# Patient Record
Sex: FEMALE | Race: WHITE | NOT HISPANIC OR LATINO | URBAN - METROPOLITAN AREA
[De-identification: names, ages, dates, MRNs, and addresses within clinical notes are randomized per-mention and may not be internally consistent; named-entity substitution may affect disease eponyms.]

---

## 2018-06-20 ENCOUNTER — INPATIENT (INPATIENT)
Facility: HOSPITAL | Age: 48
LOS: 6 days | Discharge: HOME | End: 2018-06-27
Attending: PSYCHIATRY & NEUROLOGY | Admitting: PSYCHIATRY & NEUROLOGY
Payer: MEDICAID

## 2018-06-20 VITALS
SYSTOLIC BLOOD PRESSURE: 151 MMHG | DIASTOLIC BLOOD PRESSURE: 95 MMHG | OXYGEN SATURATION: 100 % | RESPIRATION RATE: 20 BRPM | HEART RATE: 93 BPM | TEMPERATURE: 98 F

## 2018-06-20 DIAGNOSIS — F12.10 CANNABIS ABUSE, UNCOMPLICATED: ICD-10-CM

## 2018-06-20 DIAGNOSIS — F60.3 BORDERLINE PERSONALITY DISORDER: ICD-10-CM

## 2018-06-20 DIAGNOSIS — F33.2 MAJOR DEPRESSIVE DISORDER, RECURRENT SEVERE WITHOUT PSYCHOTIC FEATURES: ICD-10-CM

## 2018-06-20 DIAGNOSIS — F33.9 MAJOR DEPRESSIVE DISORDER, RECURRENT, UNSPECIFIED: ICD-10-CM

## 2018-06-20 DIAGNOSIS — F43.29 ADJUSTMENT DISORDER WITH OTHER SYMPTOMS: ICD-10-CM

## 2018-06-20 LAB
ALBUMIN SERPL ELPH-MCNC: 4.3 G/DL — SIGNIFICANT CHANGE UP (ref 3.3–5)
ALP SERPL-CCNC: 56 U/L — SIGNIFICANT CHANGE UP (ref 40–120)
ALT FLD-CCNC: 12 U/L — SIGNIFICANT CHANGE UP (ref 4–33)
AMPHET UR-MCNC: NEGATIVE — SIGNIFICANT CHANGE UP
APAP SERPL-MCNC: < 15 UG/ML — LOW (ref 15–25)
APPEARANCE UR: SIGNIFICANT CHANGE UP
AST SERPL-CCNC: 14 U/L — SIGNIFICANT CHANGE UP (ref 4–32)
BACTERIA # UR AUTO: SIGNIFICANT CHANGE UP
BARBITURATES UR SCN-MCNC: NEGATIVE — SIGNIFICANT CHANGE UP
BASOPHILS # BLD AUTO: 0.02 K/UL — SIGNIFICANT CHANGE UP (ref 0–0.2)
BASOPHILS NFR BLD AUTO: 0.2 % — SIGNIFICANT CHANGE UP (ref 0–2)
BENZODIAZ UR-MCNC: POSITIVE — SIGNIFICANT CHANGE UP
BILIRUB SERPL-MCNC: 0.2 MG/DL — SIGNIFICANT CHANGE UP (ref 0.2–1.2)
BILIRUB UR-MCNC: NEGATIVE — SIGNIFICANT CHANGE UP
BLOOD UR QL VISUAL: NEGATIVE — SIGNIFICANT CHANGE UP
BUN SERPL-MCNC: 7 MG/DL — SIGNIFICANT CHANGE UP (ref 7–23)
CALCIUM SERPL-MCNC: 9.2 MG/DL — SIGNIFICANT CHANGE UP (ref 8.4–10.5)
CANNABINOIDS UR-MCNC: POSITIVE — SIGNIFICANT CHANGE UP
CHLORIDE SERPL-SCNC: 101 MMOL/L — SIGNIFICANT CHANGE UP (ref 98–107)
CO2 SERPL-SCNC: 26 MMOL/L — SIGNIFICANT CHANGE UP (ref 22–31)
COCAINE METAB.OTHER UR-MCNC: NEGATIVE — SIGNIFICANT CHANGE UP
COLOR SPEC: YELLOW — SIGNIFICANT CHANGE UP
CREAT SERPL-MCNC: 1.09 MG/DL — SIGNIFICANT CHANGE UP (ref 0.5–1.3)
EOSINOPHIL # BLD AUTO: 0.08 K/UL — SIGNIFICANT CHANGE UP (ref 0–0.5)
EOSINOPHIL NFR BLD AUTO: 0.9 % — SIGNIFICANT CHANGE UP (ref 0–6)
ETHANOL BLD-MCNC: < 10 MG/DL — SIGNIFICANT CHANGE UP
GLUCOSE SERPL-MCNC: 100 MG/DL — HIGH (ref 70–99)
GLUCOSE UR-MCNC: NEGATIVE — SIGNIFICANT CHANGE UP
HCG UR-SCNC: NEGATIVE — SIGNIFICANT CHANGE UP
HCT VFR BLD CALC: 36 % — SIGNIFICANT CHANGE UP (ref 34.5–45)
HGB BLD-MCNC: 12 G/DL — SIGNIFICANT CHANGE UP (ref 11.5–15.5)
IMM GRANULOCYTES # BLD AUTO: 0.02 # — SIGNIFICANT CHANGE UP
IMM GRANULOCYTES NFR BLD AUTO: 0.2 % — SIGNIFICANT CHANGE UP (ref 0–1.5)
KETONES UR-MCNC: SIGNIFICANT CHANGE UP
LEUKOCYTE ESTERASE UR-ACNC: NEGATIVE — SIGNIFICANT CHANGE UP
LYMPHOCYTES # BLD AUTO: 2.63 K/UL — SIGNIFICANT CHANGE UP (ref 1–3.3)
LYMPHOCYTES # BLD AUTO: 29.6 % — SIGNIFICANT CHANGE UP (ref 13–44)
MCHC RBC-ENTMCNC: 30.2 PG — SIGNIFICANT CHANGE UP (ref 27–34)
MCHC RBC-ENTMCNC: 33.3 % — SIGNIFICANT CHANGE UP (ref 32–36)
MCV RBC AUTO: 90.7 FL — SIGNIFICANT CHANGE UP (ref 80–100)
METHADONE UR-MCNC: NEGATIVE — SIGNIFICANT CHANGE UP
MONOCYTES # BLD AUTO: 0.66 K/UL — SIGNIFICANT CHANGE UP (ref 0–0.9)
MONOCYTES NFR BLD AUTO: 7.4 % — SIGNIFICANT CHANGE UP (ref 2–14)
MUCOUS THREADS # UR AUTO: SIGNIFICANT CHANGE UP
NEUTROPHILS # BLD AUTO: 5.49 K/UL — SIGNIFICANT CHANGE UP (ref 1.8–7.4)
NEUTROPHILS NFR BLD AUTO: 61.7 % — SIGNIFICANT CHANGE UP (ref 43–77)
NITRITE UR-MCNC: NEGATIVE — SIGNIFICANT CHANGE UP
NRBC # FLD: 0 — SIGNIFICANT CHANGE UP
OPIATES UR-MCNC: NEGATIVE — SIGNIFICANT CHANGE UP
OXYCODONE UR-MCNC: NEGATIVE — SIGNIFICANT CHANGE UP
PCP UR-MCNC: NEGATIVE — SIGNIFICANT CHANGE UP
PH UR: 5.5 — SIGNIFICANT CHANGE UP (ref 4.6–8)
PLATELET # BLD AUTO: 307 K/UL — SIGNIFICANT CHANGE UP (ref 150–400)
PMV BLD: 9.6 FL — SIGNIFICANT CHANGE UP (ref 7–13)
POTASSIUM SERPL-MCNC: 4.3 MMOL/L — SIGNIFICANT CHANGE UP (ref 3.5–5.3)
POTASSIUM SERPL-SCNC: 4.3 MMOL/L — SIGNIFICANT CHANGE UP (ref 3.5–5.3)
PROT SERPL-MCNC: 7.2 G/DL — SIGNIFICANT CHANGE UP (ref 6–8.3)
PROT UR-MCNC: 10 MG/DL — SIGNIFICANT CHANGE UP
RBC # BLD: 3.97 M/UL — SIGNIFICANT CHANGE UP (ref 3.8–5.2)
RBC # FLD: 13.1 % — SIGNIFICANT CHANGE UP (ref 10.3–14.5)
RBC CASTS # UR COMP ASSIST: SIGNIFICANT CHANGE UP (ref 0–?)
SALICYLATES SERPL-MCNC: < 5 MG/DL — LOW (ref 15–30)
SODIUM SERPL-SCNC: 137 MMOL/L — SIGNIFICANT CHANGE UP (ref 135–145)
SP GR SPEC: 1.02 — SIGNIFICANT CHANGE UP (ref 1–1.04)
SQUAMOUS # UR AUTO: SIGNIFICANT CHANGE UP
TSH SERPL-MCNC: 4.49 UIU/ML — HIGH (ref 0.27–4.2)
UROBILINOGEN FLD QL: NORMAL MG/DL — SIGNIFICANT CHANGE UP
WBC # BLD: 8.9 K/UL — SIGNIFICANT CHANGE UP (ref 3.8–10.5)
WBC # FLD AUTO: 8.9 K/UL — SIGNIFICANT CHANGE UP (ref 3.8–10.5)
WBC UR QL: SIGNIFICANT CHANGE UP (ref 0–?)

## 2018-06-20 PROCEDURE — 99285 EMERGENCY DEPT VISIT HI MDM: CPT

## 2018-06-20 RX ORDER — CLONAZEPAM 1 MG
0.5 TABLET ORAL
Qty: 0 | Refills: 0 | Status: DISCONTINUED | OUTPATIENT
Start: 2018-06-20 | End: 2018-06-26

## 2018-06-20 RX ORDER — LANOLIN ALCOHOL/MO/W.PET/CERES
3 CREAM (GRAM) TOPICAL AT BEDTIME
Qty: 0 | Refills: 0 | Status: DISCONTINUED | OUTPATIENT
Start: 2018-06-20 | End: 2018-06-26

## 2018-06-20 RX ORDER — ACETAMINOPHEN 500 MG
650 TABLET ORAL ONCE
Qty: 0 | Refills: 0 | Status: COMPLETED | OUTPATIENT
Start: 2018-06-20 | End: 2018-06-20

## 2018-06-20 NOTE — ED PROVIDER NOTE - OBJECTIVE STATEMENT
This is a 47 year old Female MARIANO Family for psych eval r/t increased depression. On arrival patient is crying. patient reports he son  of an opiate overdose last year and her mother passed away 9 month ago.  Since then she is having increased depression , panic attacks and stopped taking her medication with is making her works. Reports decreased appetite and lethargy. States " I just want to go to sleep".  Reports SI thought and "being better off with my son"  Denies HI Denies AH/VH Denies ETOH/Illicit drugs

## 2018-06-20 NOTE — ED BEHAVIORAL HEALTH ASSESSMENT NOTE - SUMMARY
Patient is a 48 y/o  F, , domiciled with BF of 4 years, disabled, has an adult son who is living and a 2nd son who  of an opiate overdose at this time last year, with PPhx of major depressive disorder (pt states she was diagnosed with bipolar d/o 20 years but this dx has been switched to major depressive disorder by her outpatient psychiatrist), borderline PD, and cannabis abuse, 5 prior inpt admissions last in , 5 prior suicide attempts by OD last in  (requiring activated charcoal and gastric lavage), no reported hx of violence/legal problems, and Pmhx of gastritis and migraine headaches. Patient self presents to the ED for worsening depressed mood and intensifying suicidal thoughts in the context of the anniversary or her son's death and noncompliance with psychotropic medications. Patient requests voluntary inpt hospitalization for safety, stabilization, and restarting meds.

## 2018-06-20 NOTE — ED BEHAVIORAL HEALTH ASSESSMENT NOTE - SUICIDE RISK FACTORS
Agitation/severe anxiety/Access to means (pills, firearms, etc.)/Substance abuse/dependence/Global insomnia/Anhedonia/Unable to engage in safety planning/Hopelessness/Mood episode/Perceived burden on family and others

## 2018-06-20 NOTE — ED ADULT NURSE NOTE - OBJECTIVE STATEMENT
Marva RN: Pt currently in , presents in the ed for depression and HI without plans. Paulino grider Fac RN: Pt currently in , presents in the ed for depression and SI without plans. Pt currently still mourning death of son which took place last year, unable to cope. No other complaints voiced out. Pt was seen by NP, blood work sent by LYDIA Keen.

## 2018-06-20 NOTE — ED BEHAVIORAL HEALTH ASSESSMENT NOTE - RISK ASSESSMENT
Risk factors include current suicidal ideation, prior suicide attempts, hopelessness, anhedonia, global insomnia, severe anxiety, noncompliance with medications, and cannabis abuse. At this time pt is at high risk of imminent harm and requires inpt hospitalization for safety and stabilization.

## 2018-06-20 NOTE — ED ADULT NURSE REASSESSMENT NOTE - GENERAL PATIENT STATE
comfortable appearance/remains awake, a&ox3, calm with depressed affect. Denies intent to harm self/cooperative

## 2018-06-20 NOTE — ED BEHAVIORAL HEALTH ASSESSMENT NOTE - CASE SUMMARY
Ms. Foster is a 46 y/o F with hx of depression with prior hospitalizations and prior suicide attempt who presents with increasing depressive symptoms, tearful, sad, anhedonic, hopeless, and suicidal. She has been partially compliant with medications (stopped her prozac x 2 weeks). She is a danger to herself at this time, collateral is concerning for worsening mental status, and she is agreeable to voluntary hospitalization and likely will benefit from same. no medical issues, no medication allergies (intolerant of Lithium). agree with starting vibrid as this is outpatient plan and patient has tried many other antidepressants with minimal benefit per her report.

## 2018-06-20 NOTE — ED BEHAVIORAL HEALTH ASSESSMENT NOTE - DESCRIPTION (FIRST USE, LAST USE, QUANTITY, FREQUENCY, DURATION)
had "a few drinks" in [place] 6/14-6/18. hx of abuse. Last used 4 days ago remote hx of benzo abuse. Prescribed xanax 1mg daily (Istop reference [id number]) but BF states that pt takes more than prescribed (unknown amount)

## 2018-06-20 NOTE — ED ADULT NURSE REASSESSMENT NOTE - STATUS
awaiting transfer, no change/medically cleared for [facility] admission by Zoran WILEY

## 2018-06-20 NOTE — ED BEHAVIORAL HEALTH ASSESSMENT NOTE - DETAILS
see HPI "bloated" grandmother with bipolar d/o, brother with schizophrenia, mother with depression. Denies suicide attempts patient, her BF, and psychiatrist were informed Dr Garcia

## 2018-06-20 NOTE — ED BEHAVIORAL HEALTH NOTE - BEHAVIORAL HEALTH NOTE
Writer obtained collateral information from pt's boyfriend Marvin Daly [phone]  He reports that pt is depressed, has been mourning the anniversary of her son's death (May 27th, OD last year). He states she has been watching videos of her son, crying all day, and taking larger amounts of her medication (prescribed prozac and xanax), believes that pt has been taking xanax at higher doses than prescribed). He reports patient has endorsed suicidal thoughts (no plan), even when they were recently on vacation in [facility] he reports pt just wanted to stay in bed and endorsed SI. He reports pt has made comments of wanting to "sleep and not wake up". Endorses safety concerns. He reports pt sees Dr. Rachel in [place] [phone].

## 2018-06-20 NOTE — ED BEHAVIORAL HEALTH ASSESSMENT NOTE - DESCRIPTION
tearful, requesting admission    Vital Signs Last 24 Hrs  T(C): 36.8 (20 Jun 2018 19:51), Max: 36.8 (20 Jun 2018 19:51)  T(F): 98.2 (20 Jun 2018 19:51), Max: 98.2 (20 Jun 2018 19:51)  HR: 93 (20 Jun 2018 19:51) (93 - 93)  BP: 151/95 (20 Jun 2018 19:51) (151/95 - 151/95)  BP(mean): --  RR: 20 (20 Jun 2018 19:51) (20 - 20)  SpO2: 100% (20 Jun 2018 19:51) (100% - 100%) see HPI. NKDA see HPI. 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

## 2018-06-20 NOTE — ED BEHAVIORAL HEALTH ASSESSMENT NOTE - PAST PSYCHOTROPIC MEDICATION
remote trials of several medications (pt does not remember all of them). Recently pt was on Prozac and seroquel (this was stopped 1 year ago)

## 2018-06-20 NOTE — ED PROVIDER NOTE - MEDICAL DECISION MAKING DETAILS
This is a 47 year old Female MARIANO Family for psych eval r/t increased depression. On arrival patient is crying. patient reports he son  of an opiate overdose last year and her mother passed away 9 month ago.  Since then she is having increased depression , panic attacks and stopped taking her medication with is making her works. Reports decreased appetite and lethargy. States " I just want to go to sleep" Medical evaluation performed. There is no clinical evidence of intoxication or any acute medical problem requiring immediate intervention. Final disposition will be determined by psychiatrist.

## 2018-06-20 NOTE — ED ADULT TRIAGE NOTE - CHIEF COMPLAINT QUOTE
Pt with co depression and SI thoughts tearful in triage reports that her son  of opoid over dose x 1 year ago and that she is not coping well. pt admits to not taking her Prozac x 2 weeks, /pt took xanax 1mg prior to coming to hospital.

## 2018-06-20 NOTE — ED BEHAVIORAL HEALTH ASSESSMENT NOTE - HPI (INCLUDE ILLNESS QUALITY, SEVERITY, DURATION, TIMING, CONTEXT, MODIFYING FACTORS, ASSOCIATED SIGNS AND SYMPTOMS)
Patient is a 46 y/o  F, , domiciled with BF of 4 years, disabled, has an adult son who is living and a 2nd son who  of an opiate overdose at this time last year, with PPhx of major depressive disorder (pt states she was diagnosed with bipolar d/o 20 years but this dx has been switched to major depressive disorder by her outpatient psychiatrist), borderline PD, and cannabis abuse, 5 prior inpt admissions last in , 5 prior suicide attempts by OD last in  (requiring activated charcoal and gastric lavage), no reported hx of violence/legal problems, and Pmhx of gastritis and migraine headaches. Patient self presents to the ED for worsening depressed mood and intensifying suicidal thoughts in the context of the anniversary or her son's death.    On interview pt is tearful, stating that since her son  of an opiate overdose last year she has been feeling increasingly guilty and blaming herself for his death. In addition, pt states her mother had  9 months prior to her son, so patient was grieving many losses at the same time. Patient reports that for the past 2 months she has been feeling increasingly depressed, having panic attacks every couple days, anhedonic, and having no motivation to get out of bed. She reports poor sleep, low appetite, poor energy level, and inability to concentrate. She states that she has been having thoughts that she would be better off dead with her son, and states these thoughts are intensifying, leading pt to bring herself to the ED for help. She requests inpt hospitalization for safety and stabilization. Patient states she has been on Prozac 40mg daily for 20 years but self-d/michael it 2 weeks ago as it has not been helping. She states her psychiatrist prescribed Viibryd but she has been hesitant to start it due to fear of side effects. She reports that she would feel safer starting it in the inpatient setting where she can be monitored. She denies HI/I/P or access to firearms. She denies all current/past manic and psychotic symptoms. She reports smoking MJ 4 days ago and while she was on vacation in [place] from - (pt states she did not enjoy her vacation except for the moments she was using substances). She reports having having "a few drinks" while in [place]. She otherwise denies substance use.     See collateral from pt's BF in the  note. Patient is a 46 y/o  F, , domiciled with BF of 4 years, disabled, has an adult son who is living and a 2nd son who  of an opiate overdose at this time last year, with PPhx of major depressive disorder (pt states she was diagnosed with bipolar d/o 20 years but this dx has been switched to major depressive disorder by her outpatient psychiatrist), borderline PD, and cannabis abuse, 5 prior inpt admissions last in , 5 prior suicide attempts by OD last in  (requiring activated charcoal and gastric lavage), no reported hx of violence/legal problems, and Pmhx of gastritis and migraine headaches. Patient self presents to the ED for worsening depressed mood and intensifying suicidal thoughts in the context of the anniversary or her son's death.    On interview pt is tearful, stating that since her son  of an opiate overdose last year she has been feeling increasingly guilty and blaming herself for his death. In addition, pt states her mother had  9 months prior to her son, so patient was grieving many losses at the same time. Patient reports that for the past 2 months she has been feeling increasingly depressed, having panic attacks every couple days, anhedonic, and having no motivation to get out of bed. She reports poor sleep, low appetite, poor energy level, and inability to concentrate. She states that she has been having thoughts that she would be better off dead with her son, and states these thoughts are intensifying, leading pt to bring herself to the ED for help. She requests inpt hospitalization for safety and stabilization. Patient states she has been on Prozac 40mg daily for 20 years but self-d/michael it 2 weeks ago as it has not been helping. She states her psychiatrist prescribed Viibryd but she has been hesitant to start it due to fear of side effects. She reports that she would feel safer starting it in the inpatient setting where she can be monitored. She denies HI/I/P or access to firearms. She denies all current/past manic and psychotic symptoms. She reports smoking MJ 4 days ago and while she was on vacation in [place] from - (pt states she did not enjoy her vacation except for the moments she was using substances). She reports having had "a few drinks" while in [place]. She otherwise denies substance use.     See collateral from pt's BF in the  note.

## 2018-06-21 PROCEDURE — 99222 1ST HOSP IP/OBS MODERATE 55: CPT

## 2018-06-21 RX ADMIN — Medication 650 MILLIGRAM(S): at 01:22

## 2018-06-21 RX ADMIN — Medication 3 MILLIGRAM(S): at 00:07

## 2018-06-21 RX ADMIN — Medication 0.5 MILLIGRAM(S): at 21:33

## 2018-06-21 RX ADMIN — Medication 650 MILLIGRAM(S): at 00:07

## 2018-06-21 RX ADMIN — Medication 0.5 MILLIGRAM(S): at 00:07

## 2018-06-21 RX ADMIN — Medication 0.5 MILLIGRAM(S): at 08:02

## 2018-06-22 PROCEDURE — 99232 SBSQ HOSP IP/OBS MODERATE 35: CPT | Mod: 25

## 2018-06-22 PROCEDURE — 90853 GROUP PSYCHOTHERAPY: CPT

## 2018-06-22 RX ORDER — TRAZODONE HCL 50 MG
50 TABLET ORAL AT BEDTIME
Qty: 0 | Refills: 0 | Status: DISCONTINUED | OUTPATIENT
Start: 2018-06-22 | End: 2018-06-24

## 2018-06-22 RX ORDER — HYDROXYZINE HCL 10 MG
25 TABLET ORAL EVERY 6 HOURS
Qty: 0 | Refills: 0 | Status: DISCONTINUED | OUTPATIENT
Start: 2018-06-22 | End: 2018-06-27

## 2018-06-22 RX ADMIN — Medication 3 MILLIGRAM(S): at 00:11

## 2018-06-22 RX ADMIN — Medication 0.5 MILLIGRAM(S): at 09:15

## 2018-06-22 RX ADMIN — Medication 10 MILLIGRAM(S): at 12:27

## 2018-06-22 RX ADMIN — Medication 2 MILLIGRAM(S): at 03:18

## 2018-06-22 RX ADMIN — Medication 50 MILLIGRAM(S): at 20:34

## 2018-06-22 RX ADMIN — Medication 0.5 MILLIGRAM(S): at 20:34

## 2018-06-23 PROCEDURE — 99231 SBSQ HOSP IP/OBS SF/LOW 25: CPT

## 2018-06-23 RX ADMIN — Medication 0.5 MILLIGRAM(S): at 21:31

## 2018-06-23 RX ADMIN — Medication 2 MILLIGRAM(S): at 13:35

## 2018-06-23 RX ADMIN — Medication 50 MILLIGRAM(S): at 21:31

## 2018-06-23 RX ADMIN — Medication 0.5 MILLIGRAM(S): at 08:51

## 2018-06-23 RX ADMIN — Medication 10 MILLIGRAM(S): at 08:51

## 2018-06-24 PROCEDURE — 99231 SBSQ HOSP IP/OBS SF/LOW 25: CPT

## 2018-06-24 RX ORDER — TRAZODONE HCL 50 MG
100 TABLET ORAL AT BEDTIME
Qty: 0 | Refills: 0 | Status: DISCONTINUED | OUTPATIENT
Start: 2018-06-24 | End: 2018-06-25

## 2018-06-24 RX ORDER — IBUPROFEN 200 MG
600 TABLET ORAL ONCE
Qty: 0 | Refills: 0 | Status: COMPLETED | OUTPATIENT
Start: 2018-06-24 | End: 2018-06-24

## 2018-06-24 RX ADMIN — Medication 0.5 MILLIGRAM(S): at 09:40

## 2018-06-24 RX ADMIN — Medication 0.5 MILLIGRAM(S): at 21:08

## 2018-06-24 RX ADMIN — Medication 600 MILLIGRAM(S): at 22:15

## 2018-06-24 RX ADMIN — Medication 100 MILLIGRAM(S): at 21:08

## 2018-06-24 RX ADMIN — Medication 10 MILLIGRAM(S): at 09:40

## 2018-06-25 PROCEDURE — 99232 SBSQ HOSP IP/OBS MODERATE 35: CPT

## 2018-06-25 RX ORDER — TRAZODONE HCL 50 MG
100 TABLET ORAL
Qty: 0 | Refills: 0 | Status: DISCONTINUED | OUTPATIENT
Start: 2018-06-25 | End: 2018-06-27

## 2018-06-25 RX ORDER — IBUPROFEN 200 MG
400 TABLET ORAL EVERY 6 HOURS
Qty: 0 | Refills: 0 | Status: DISCONTINUED | OUTPATIENT
Start: 2018-06-25 | End: 2018-06-27

## 2018-06-25 RX ADMIN — Medication 10 MILLIGRAM(S): at 09:07

## 2018-06-25 RX ADMIN — Medication 0.5 MILLIGRAM(S): at 09:07

## 2018-06-25 RX ADMIN — Medication 100 MILLIGRAM(S): at 20:40

## 2018-06-25 RX ADMIN — Medication 400 MILLIGRAM(S): at 17:53

## 2018-06-25 RX ADMIN — Medication 400 MILLIGRAM(S): at 17:22

## 2018-06-25 RX ADMIN — Medication 0.5 MILLIGRAM(S): at 20:40

## 2018-06-26 PROCEDURE — 99232 SBSQ HOSP IP/OBS MODERATE 35: CPT

## 2018-06-26 RX ORDER — CLONAZEPAM 1 MG
0.5 TABLET ORAL
Qty: 0 | Refills: 0 | Status: DISCONTINUED | OUTPATIENT
Start: 2018-06-26 | End: 2018-06-27

## 2018-06-26 RX ORDER — CLONAZEPAM 1 MG
1 TABLET ORAL
Qty: 30 | Refills: 0 | OUTPATIENT
Start: 2018-06-26 | End: 2018-07-10

## 2018-06-26 RX ORDER — TRAZODONE HCL 50 MG
1 TABLET ORAL
Qty: 15 | Refills: 0 | OUTPATIENT
Start: 2018-06-26 | End: 2018-07-10

## 2018-06-26 RX ADMIN — Medication 0.5 MILLIGRAM(S): at 11:05

## 2018-06-26 RX ADMIN — Medication 20 MILLIGRAM(S): at 11:05

## 2018-06-26 RX ADMIN — Medication 100 MILLIGRAM(S): at 21:53

## 2018-06-26 RX ADMIN — Medication 0.5 MILLIGRAM(S): at 21:53

## 2018-06-27 VITALS — RESPIRATION RATE: 17 BRPM | TEMPERATURE: 98 F

## 2018-06-27 PROCEDURE — 99238 HOSP IP/OBS DSCHRG MGMT 30/<: CPT

## 2018-06-27 RX ADMIN — Medication 0.5 MILLIGRAM(S): at 09:43

## 2018-06-27 RX ADMIN — Medication 20 MILLIGRAM(S): at 09:43

## 2018-08-05 ENCOUNTER — INPATIENT (INPATIENT)
Facility: HOSPITAL | Age: 48
LOS: 4 days | Discharge: HOME | End: 2018-08-10
Attending: PSYCHIATRY & NEUROLOGY | Admitting: PSYCHIATRY & NEUROLOGY
Payer: MEDICAID

## 2018-08-05 VITALS
RESPIRATION RATE: 16 BRPM | HEART RATE: 100 BPM | TEMPERATURE: 98 F | OXYGEN SATURATION: 100 % | DIASTOLIC BLOOD PRESSURE: 70 MMHG | SYSTOLIC BLOOD PRESSURE: 111 MMHG

## 2018-08-05 DIAGNOSIS — T50.902A POISONING BY UNSPECIFIED DRUGS, MEDICAMENTS AND BIOLOGICAL SUBSTANCES, INTENTIONAL SELF-HARM, INITIAL ENCOUNTER: ICD-10-CM

## 2018-08-05 DIAGNOSIS — Z98.51 TUBAL LIGATION STATUS: Chronic | ICD-10-CM

## 2018-08-05 LAB
ALBUMIN SERPL ELPH-MCNC: 4 G/DL — SIGNIFICANT CHANGE UP (ref 3.3–5)
ALP SERPL-CCNC: 50 U/L — SIGNIFICANT CHANGE UP (ref 40–120)
ALT FLD-CCNC: 11 U/L — SIGNIFICANT CHANGE UP (ref 4–33)
AMPHET UR-MCNC: NEGATIVE — SIGNIFICANT CHANGE UP
APAP SERPL-MCNC: < 15 UG/ML — LOW (ref 15–25)
APPEARANCE UR: CLEAR — SIGNIFICANT CHANGE UP
AST SERPL-CCNC: 14 U/L — SIGNIFICANT CHANGE UP (ref 4–32)
BACTERIA # UR AUTO: SIGNIFICANT CHANGE UP
BARBITURATES UR SCN-MCNC: NEGATIVE — SIGNIFICANT CHANGE UP
BASOPHILS # BLD AUTO: 0.02 K/UL — SIGNIFICANT CHANGE UP (ref 0–0.2)
BASOPHILS NFR BLD AUTO: 0.2 % — SIGNIFICANT CHANGE UP (ref 0–2)
BENZODIAZ UR-MCNC: NEGATIVE — SIGNIFICANT CHANGE UP
BILIRUB SERPL-MCNC: 0.4 MG/DL — SIGNIFICANT CHANGE UP (ref 0.2–1.2)
BILIRUB UR-MCNC: NEGATIVE — SIGNIFICANT CHANGE UP
BLOOD UR QL VISUAL: NEGATIVE — SIGNIFICANT CHANGE UP
BUN SERPL-MCNC: 5 MG/DL — LOW (ref 7–23)
CALCIUM SERPL-MCNC: 9.3 MG/DL — SIGNIFICANT CHANGE UP (ref 8.4–10.5)
CANNABINOIDS UR-MCNC: POSITIVE — SIGNIFICANT CHANGE UP
CHLORIDE SERPL-SCNC: 102 MMOL/L — SIGNIFICANT CHANGE UP (ref 98–107)
CO2 SERPL-SCNC: 25 MMOL/L — SIGNIFICANT CHANGE UP (ref 22–31)
COCAINE METAB.OTHER UR-MCNC: NEGATIVE — SIGNIFICANT CHANGE UP
COLOR SPEC: SIGNIFICANT CHANGE UP
CREAT SERPL-MCNC: 0.86 MG/DL — SIGNIFICANT CHANGE UP (ref 0.5–1.3)
EOSINOPHIL # BLD AUTO: 0.05 K/UL — SIGNIFICANT CHANGE UP (ref 0–0.5)
EOSINOPHIL NFR BLD AUTO: 0.6 % — SIGNIFICANT CHANGE UP (ref 0–6)
ETHANOL BLD-MCNC: < 10 MG/DL — SIGNIFICANT CHANGE UP
GLUCOSE SERPL-MCNC: 105 MG/DL — HIGH (ref 70–99)
GLUCOSE UR-MCNC: NEGATIVE — SIGNIFICANT CHANGE UP
HCG SERPL-ACNC: < 5 MIU/ML — SIGNIFICANT CHANGE UP
HCT VFR BLD CALC: 36.7 % — SIGNIFICANT CHANGE UP (ref 34.5–45)
HGB BLD-MCNC: 11.9 G/DL — SIGNIFICANT CHANGE UP (ref 11.5–15.5)
IMM GRANULOCYTES # BLD AUTO: 0.02 # — SIGNIFICANT CHANGE UP
IMM GRANULOCYTES NFR BLD AUTO: 0.2 % — SIGNIFICANT CHANGE UP (ref 0–1.5)
KETONES UR-MCNC: NEGATIVE — SIGNIFICANT CHANGE UP
LEUKOCYTE ESTERASE UR-ACNC: NEGATIVE — SIGNIFICANT CHANGE UP
LYMPHOCYTES # BLD AUTO: 1.64 K/UL — SIGNIFICANT CHANGE UP (ref 1–3.3)
LYMPHOCYTES # BLD AUTO: 19.7 % — SIGNIFICANT CHANGE UP (ref 13–44)
MCHC RBC-ENTMCNC: 30.7 PG — SIGNIFICANT CHANGE UP (ref 27–34)
MCHC RBC-ENTMCNC: 32.4 % — SIGNIFICANT CHANGE UP (ref 32–36)
MCV RBC AUTO: 94.6 FL — SIGNIFICANT CHANGE UP (ref 80–100)
METHADONE UR-MCNC: NEGATIVE — SIGNIFICANT CHANGE UP
MONOCYTES # BLD AUTO: 0.63 K/UL — SIGNIFICANT CHANGE UP (ref 0–0.9)
MONOCYTES NFR BLD AUTO: 7.6 % — SIGNIFICANT CHANGE UP (ref 2–14)
NEUTROPHILS # BLD AUTO: 5.95 K/UL — SIGNIFICANT CHANGE UP (ref 1.8–7.4)
NEUTROPHILS NFR BLD AUTO: 71.7 % — SIGNIFICANT CHANGE UP (ref 43–77)
NITRITE UR-MCNC: NEGATIVE — SIGNIFICANT CHANGE UP
NRBC # FLD: 0 — SIGNIFICANT CHANGE UP
OPIATES UR-MCNC: NEGATIVE — SIGNIFICANT CHANGE UP
OXYCODONE UR-MCNC: NEGATIVE — SIGNIFICANT CHANGE UP
PCP UR-MCNC: NEGATIVE — SIGNIFICANT CHANGE UP
PH UR: 6.5 — SIGNIFICANT CHANGE UP (ref 4.6–8)
PLATELET # BLD AUTO: 304 K/UL — SIGNIFICANT CHANGE UP (ref 150–400)
PMV BLD: 9.8 FL — SIGNIFICANT CHANGE UP (ref 7–13)
POTASSIUM SERPL-MCNC: 3.6 MMOL/L — SIGNIFICANT CHANGE UP (ref 3.5–5.3)
POTASSIUM SERPL-SCNC: 3.6 MMOL/L — SIGNIFICANT CHANGE UP (ref 3.5–5.3)
PROT SERPL-MCNC: 6.7 G/DL — SIGNIFICANT CHANGE UP (ref 6–8.3)
PROT UR-MCNC: NEGATIVE MG/DL — SIGNIFICANT CHANGE UP
RBC # BLD: 3.88 M/UL — SIGNIFICANT CHANGE UP (ref 3.8–5.2)
RBC # FLD: 13 % — SIGNIFICANT CHANGE UP (ref 10.3–14.5)
RBC CASTS # UR COMP ASSIST: SIGNIFICANT CHANGE UP (ref 0–?)
SALICYLATES SERPL-MCNC: < 5 MG/DL — LOW (ref 15–30)
SODIUM SERPL-SCNC: 140 MMOL/L — SIGNIFICANT CHANGE UP (ref 135–145)
SP GR SPEC: 1.01 — SIGNIFICANT CHANGE UP (ref 1–1.04)
SQUAMOUS # UR AUTO: SIGNIFICANT CHANGE UP
TSH SERPL-MCNC: 0.96 UIU/ML — SIGNIFICANT CHANGE UP (ref 0.27–4.2)
UROBILINOGEN FLD QL: NORMAL MG/DL — SIGNIFICANT CHANGE UP
WBC # BLD: 8.31 K/UL — SIGNIFICANT CHANGE UP (ref 3.8–10.5)
WBC # FLD AUTO: 8.31 K/UL — SIGNIFICANT CHANGE UP (ref 3.8–10.5)
WBC UR QL: SIGNIFICANT CHANGE UP (ref 0–?)

## 2018-08-05 PROCEDURE — 99285 EMERGENCY DEPT VISIT HI MDM: CPT | Mod: GC

## 2018-08-05 RX ORDER — SODIUM CHLORIDE 9 MG/ML
1000 INJECTION INTRAMUSCULAR; INTRAVENOUS; SUBCUTANEOUS ONCE
Qty: 0 | Refills: 0 | Status: COMPLETED | OUTPATIENT
Start: 2018-08-05 | End: 2018-08-05

## 2018-08-05 RX ORDER — CLONAZEPAM 1 MG
0.5 TABLET ORAL
Qty: 0 | Refills: 0 | Status: DISCONTINUED | OUTPATIENT
Start: 2018-08-05 | End: 2018-08-07

## 2018-08-05 RX ORDER — METOCLOPRAMIDE HCL 10 MG
10 TABLET ORAL ONCE
Qty: 0 | Refills: 0 | Status: COMPLETED | OUTPATIENT
Start: 2018-08-05 | End: 2018-08-05

## 2018-08-05 RX ORDER — FAMOTIDINE 10 MG/ML
20 INJECTION INTRAVENOUS
Qty: 0 | Refills: 0 | Status: DISCONTINUED | OUTPATIENT
Start: 2018-08-05 | End: 2018-08-10

## 2018-08-05 RX ORDER — LANOLIN ALCOHOL/MO/W.PET/CERES
3 CREAM (GRAM) TOPICAL AT BEDTIME
Qty: 0 | Refills: 0 | Status: DISCONTINUED | OUTPATIENT
Start: 2018-08-05 | End: 2018-08-10

## 2018-08-05 RX ORDER — ACETAMINOPHEN 500 MG
650 TABLET ORAL EVERY 6 HOURS
Qty: 0 | Refills: 0 | Status: DISCONTINUED | OUTPATIENT
Start: 2018-08-05 | End: 2018-08-10

## 2018-08-05 RX ORDER — TRAZODONE HCL 50 MG
100 TABLET ORAL AT BEDTIME
Qty: 0 | Refills: 0 | Status: DISCONTINUED | OUTPATIENT
Start: 2018-08-05 | End: 2018-08-10

## 2018-08-05 RX ADMIN — SODIUM CHLORIDE 1000 MILLILITER(S): 9 INJECTION INTRAMUSCULAR; INTRAVENOUS; SUBCUTANEOUS at 17:26

## 2018-08-05 RX ADMIN — SODIUM CHLORIDE 3000 MILLILITER(S): 9 INJECTION INTRAMUSCULAR; INTRAVENOUS; SUBCUTANEOUS at 16:37

## 2018-08-05 RX ADMIN — Medication 10 MILLIGRAM(S): at 20:28

## 2018-08-05 NOTE — ED BEHAVIORAL HEALTH ASSESSMENT NOTE - RISK ASSESSMENT
Risk factors include current suicidal ideation, prior suicide attempts, hopelessness, anhedonia, global insomnia, severe anxiety, noncompliance with medications, and cannabis abuse. At this time pt is at high risk of imminent harm and requires inpt hospitalization for safety and stabilization. Risk factors include recent suicide attempt, current suicidal ideation, prior suicide attempts, hopelessness, anhedonia, global insomnia, severe anxiety, noncompliance with medications, and cannabis abuse. At this time pt is at high risk of imminent harm and requires inpt hospitalization for safety and stabilization.

## 2018-08-05 NOTE — ED BEHAVIORAL HEALTH ASSESSMENT NOTE - PSYCHIATRIC ISSUES AND PLAN (INCLUDE STANDING AND PRN MEDICATION)
Start Viibryd 10mg daily (needs to be ordered once transfer to [facility]). D/c xanax and replace with Klonopin 0.5mg BID. Ativan 2mg PO/IM q6h PRN severe agitation. Melatonin 3mg PRN insomnia Klonopin 0.5 mg BID, Trazodone 100 mg qhs prn insomnia, Ativan 2mg PO/IM q6h PRN anxiety/agitation. Melatonin 3mg PRN insomnia.

## 2018-08-05 NOTE — ED PROVIDER NOTE - MEDICAL DECISION MAKING DETAILS
47 y/o female with pmhx of gastritis, depression, suicidal attempts, admissions to [facility] presents to ED for suicide attempt with trazadone and klonopin overdose. EKG with no qt prolongation. plan- check labs,  hydrate, toxicology consult, and pysch consult

## 2018-08-05 NOTE — ED BEHAVIORAL HEALTH ASSESSMENT NOTE - DESCRIPTION
tearful, requesting admission    Vital Signs Last 24 Hrs  T(C): 36.9 (05 Aug 2018 20:00), Max: 36.9 (05 Aug 2018 20:00)  T(F): 98.4 (05 Aug 2018 20:00), Max: 98.4 (05 Aug 2018 20:00)  HR: 82 (05 Aug 2018 20:00) (80 - 100)  BP: 113/68 (05 Aug 2018 20:00) (76/42 - 113/68)  BP(mean): --  RR: 18 (05 Aug 2018 20:00) (14 - 18)  SpO2: 100% (05 Aug 2018 20:00) (98% - 100%) see HPI. NKDA see HPI. 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 gastritis

## 2018-08-05 NOTE — ED PROVIDER NOTE - CONSTITUTIONAL, MLM
normal... Well appearing, well nourished, awake, alert, oriented to person, place, time/situation and in no apparent distress. Crying.

## 2018-08-05 NOTE — ED BEHAVIORAL HEALTH ASSESSMENT NOTE - SUICIDE RISK FACTORS
Unable to engage in safety planning/Global insomnia/Anhedonia/Access to means (pills, firearms, etc.)/Hopelessness/Substance abuse/dependence/Agitation/severe anxiety/Perceived burden on family and others/Mood episode Global insomnia/Anhedonia/Agitation/severe anxiety/Hopelessness/Mood episode/Access to means (pills, firearms, etc.)/Perceived burden on family and others/Unable to engage in safety planning

## 2018-08-05 NOTE — ED BEHAVIORAL HEALTH ASSESSMENT NOTE - CURRENT MEDICATION
xanax 1mg daily, Excedrin PRN, zantac daily. trazodone, klonopin, viibryd (self d/c'd 1 wk ago), ranitidine 150 mg BID

## 2018-08-05 NOTE — CONSULT NOTE ADULT - ATTENDING COMMENTS
I evaluated this patient and wrote this entire consult on my own.    Hiren Bright MD  Toxicology Attending

## 2018-08-05 NOTE — ED BEHAVIORAL HEALTH ASSESSMENT NOTE - CASE SUMMARY
47 y/o  woman, , domiciled with BF of 4 years, disabled, has an adult son who is living and a 2nd son who  of an opiate overdose at this time last year, with PPhx of major depressive disorder (pt states she was diagnosed with bipolar d/o 20 years but this dx has been switched to major depressive disorder by her outpatient psychiatrist), borderline PD, and cannabis abuse, 6 prior inpt admissions (last [facility] -), 5 prior suicide attempts by overdose several years ago, no reported hx of violence/legal problems, Pmhx of gastritis and migraine headaches. Patient self presents to the ED for suicide attempt  by over dose. Patient presents depressed, anxious, and ongoing SI. She poses an imminent danger to self and will be admitted 9.13

## 2018-08-05 NOTE — ED PROVIDER NOTE - OBJECTIVE STATEMENT
49 y/o female with pmhx of gastritis, depression, suicidal attempts, admissions to [facility] presents to ED for suicide attempt x 3 days. Pt states she feels sad and depressed and does not want to live anymore. Pt took six to eight 100mg Trazadone pills with ten 0.1mg Klonopin pills within course of an hour last night. Pt has been taking same amount daily for past 3 days. Denies any acute aggravating factors, has had depression all her life, mother and son . Medication regime includes trazadone, klonopin and viibryd. Stopped viibryd last week due to n/v. Pt c/o nausea. Endoscopy in march showed gastritis. Lives with boyfriend who she feels safe with. Pt does not feel safe when alone due to wanting to comit suicide. No fever, chills, cp, sob, palpitations, abd pain, vomiting, tremors, diaphoresis, headache, dizziness, LOC.  Psychiatrist Dr. Rachel at [facility]

## 2018-08-05 NOTE — ED ADULT NURSE REASSESSMENT NOTE - NS ED NURSE REASSESS COMMENT FT1
Pt sitting calmly on the chair, in NAD. Pt crying on approach, stating "I just don't wanna be here, I miss my son, I can't get over his death". Enhance monitoring in place. Vitals signs are stable. Pt endorsing above stated symptoms. denies SOB or chest pain. MD Szymanski notified, same to review pt. Pt reassured. encouraged to ask for assistance with ambulation. safety and comfort measures maintained. Will continue to monitor.

## 2018-08-05 NOTE — ED ADULT NURSE NOTE - OBJECTIVE STATEMENT
Pt received in #23, lethargic but easily arousable to verbal stimuli with c/o suicide at Pt received in #23, lethargic but easily arousable to verbal stimuli with c/o suicide attempt via overdose in pills. Pt verbalizes "I am tired of being depressed". Denies hi or avh. Pt denies any pain. Belongings secured with security. Pt on CM in SR, IV established, labs sent. Pt on 1:1 CO without incident.

## 2018-08-05 NOTE — ED BEHAVIORAL HEALTH ASSESSMENT NOTE - DESCRIPTION (FIRST USE, LAST USE, QUANTITY, FREQUENCY, DURATION)
had "a few drinks" in [place] 6/14-6/18. hx of abuse. Last used 4 days ago remote hx of benzo abuse. Prescribed xanax 1mg daily (Istop reference [id number]) but BF states that pt takes more than prescribed (unknown amount) intermittent TCH oil use over the past couple months remote hx of benzo abuse.

## 2018-08-05 NOTE — CONSULT NOTE ADULT - PROBLEM SELECTOR RECOMMENDATION 9
1. Ingestion of trazodone has been associated with somnolence.  Respiratory arrest has occurred with trazodone overdoses over 2 gm, but this pt does not display any signs of respiratory depression or arrest.  Trazodone can be serotonergic but this patient does not display signs of serotonin toxicity.  The pt is now almost 20 hours from last reported ingestion of trazodone and does not appear significantly intoxicated from trazodone--treatment should be supportive only.  2. Ingestion of clonazepam has been associated with CNS depression, especially in large doses or when combined with other sedating medications.  The pt admits to taking normal dose of clonazepam this morning (over 8 hours ago), last overdose of clonazepam was almost 20 hours ago.  The patient does not appear significantly CNS depressed--treatment can be supportive only.  3. The patient's labs, vital signs and EKG were evaluated, in conjunction with the pt interview and physical exam.  The patient is now toxicologically cleared.  Final disposition for pt can be determined by ED team.

## 2018-08-05 NOTE — ED BEHAVIORAL HEALTH ASSESSMENT NOTE - SUMMARY
Patient is a 46 y/o  F, , domiciled with BF of 4 years, disabled, has an adult son who is living and a 2nd son who  of an opiate overdose at this time last year, with PPhx of major depressive disorder (pt states she was diagnosed with bipolar d/o 20 years but this dx has been switched to major depressive disorder by her outpatient psychiatrist), borderline PD, and cannabis abuse, 5 prior inpt admissions last in , 5 prior suicide attempts by OD last in  (requiring activated charcoal and gastric lavage), no reported hx of violence/legal problems, and Pmhx of gastritis and migraine headaches. Patient self presents to the ED for worsening depressed mood and intensifying suicidal thoughts in the context of the anniversary or her son's death and noncompliance with psychotropic medications. Patient requests voluntary inpt hospitalization for safety, stabilization, and restarting meds. 49 y/o  woman, , domiciled with BF of 4 years, disabled, has an adult son who is living and a 2nd son who  of an opiate overdose at this time last year, with PPhx of major depressive disorder (pt states she was diagnosed with bipolar d/o 20 years but this dx has been switched to major depressive disorder by her outpatient psychiatrist), borderline PD, and cannabis abuse, 6 prior inpt admissions (last [facility] -), 5 prior suicide attempts by overdose several years ago, no reported hx of violence/legal problems, Pmhx of gastritis and migraine headaches. Patient self presents to the ED for suicide attempt x 3 days. Patient reports worsening depression and ongoing suicidal ideation. Patient requires inpatient hospitalization for safety and stabilization, will sign voluntary.

## 2018-08-05 NOTE — ED BEHAVIORAL HEALTH ASSESSMENT NOTE - DIFFERENTIAL
major depressive disorder, complicated bereavement, cannabis abuse major depressive disorder, complicated bereavement, cannabis use

## 2018-08-05 NOTE — ED BEHAVIORAL HEALTH ASSESSMENT NOTE - SUBSTANCE ISSUES AND PLAN (INCLUDE STANDING AND PRN MEDICATION)
CIWA with symptom triggered ativan 2mg q2h PRN withdrawal symptoms symptom triggered CIWA for possible benzo w/d n/a

## 2018-08-05 NOTE — ED ADULT TRIAGE NOTE - CHIEF COMPLAINT QUOTE
Pt c/o suicidal attempt, taken 9, 100 mg pills of Trazadone and 10, .1 mg pills of Klonopin each day for the past 3 days.

## 2018-08-05 NOTE — ED ADULT NURSE NOTE - NSIMPLEMENTINTERV_GEN_ALL_ED
Implemented All Fall Risk Interventions:  [place] to call system. Call bell, personal items and telephone within reach. Instruct patient to call for assistance. Room bathroom lighting operational. Non-slip footwear when patient is off stretcher. Physically safe environment: no spills, clutter or unnecessary equipment. Stretcher in lowest position, wheels locked, appropriate side rails in place. Provide visual cue, wrist band, yellow gown, etc. Monitor gait and stability. Monitor for mental status changes and reorient to person, place, and time. Review medications for side effects contributing to fall risk. Reinforce activity limits and safety measures with patient and family.

## 2018-08-05 NOTE — ED PROVIDER NOTE - PROGRESS NOTE DETAILS
RENAN Greer- pt medically cleared by tox. psych paged. RENAN Greer- spoke with psych attng Dr. Madrid pt to be transported to . RENAN Greer- signed out to psych attng Dr. Madrid pt to be transported to .

## 2018-08-05 NOTE — CONSULT NOTE ADULT - SUBJECTIVE AND OBJECTIVE BOX
MEDICAL TOXICOLOGY CONSULT    HPI: 49 yo female with PMH depression (multiple suicide attempts by overdose in the past, past inpatient psychiatric admissions), in ED after boyfriend brought her when she told him that she had overdosed on her medications.  Pt states that for the last 3 nights she has taken clonazepam 1 mg x 10 tabs.  Last night in addition to this clonazepam she also took 6-7 tabs of trazodone 100 mg.  She states that she took the medication because she "did not want to wake up".  She notes significant stressors in her life including arguments with siblings, as well as feeling sad about the death of her son by overdose 15 months ago.  She has 1 living son who is in the [employer] and is doing well, which she states is her reason for living.  Besides feeling depressed with SI, pt endorses generalized weakness and dizziness.  She denies CP/SOB, N/V/D or abdominal pain.  This morning pt took clonazepam 1 mg x 1 but no other medications.      ONSET / TIME of exposure(s): last overdose "last night"    QUANTITY of exposure(s): clonazepam 1 mg x 10 tabs for 3 nights in a row, last night also with trazodone 100 mg x 6-7 tabs    ROUTE of exposure:  ingestion    CONTEXT of exposure: at home    ASSOCIATED symptoms: depression, SI    PAST MEDICAL & SURGICAL HISTORY:  Gastritis  Tubal Ligation  Depression  H/O tubal ligation    MEDICATION HISTORY: currently on clonazepam, trazodone and ranitidine; stopped vilazodone on her own 5 days ago due to stomach upset      RECREATIONAL / ETHANOL / SUPPLEMENT USE:    SOCIAL Hx:  lives with boyfriend, on SSI/not working, denies EtOH or illicit substance use regularly but states that recently she has been smoking MJA to help with her depressive symptoms    FAMILY HISTORY:  son  by overdose 15 months ago    REVIEW OF SYSTEMS:     General:  no fever, chills, change in weight or fatigue; +generalized weakness  Eyes:  no blurry vision, double vision, or diminished acuity  ENT:  no tinnitus, decreased acuity, epistaxis, sore throat, dysphagia  Cardiac: no chest pain, syncope, or palpitations  Lung:  no cough, shortness of breath, stridor, or wheezing  GI:  no abdominal pain, nausea, vomiting, diarrhea, or constipation  Genitourinary: no dysuria, hematuria, or incontinence  Ortho: no joint pain, swelling, myalgias, atrophy, or spasm  Skin: no rash, lesions, or pruritis  Neuro:  no headache, weakness/numbness, ataxia, change in speech, dizziness, tremor, or seizure  Psych: +depression +suicidal, not homicidal  Endocrine: no polydypsia, polyuria, heat/cold intolerance  Hematologic: no bleeding, bruising, petechiae, or adenopathy  Immune:  no rhinitis, atopy, immunocompromise, HIV, or cancer    PHYSICAL EXAM  Vital Signs Last 24 Hrs  T(C): 36.8 (05 Aug 2018 16:16), Max: 36.8 (05 Aug 2018 16:16)  T(F): 98.2 (05 Aug 2018 16:16), Max: 98.2 (05 Aug 2018 16:16)  HR: 80 (05 Aug 2018 16:48) (80 - 100)  BP: 113/59 (05 Aug 2018 16:48) (76/42 - 113/59)  BP(mean): --  RR: 14 (05 Aug 2018 16:48) (14 - 16)  SpO2: 100% (05 Aug 2018 16:48) (98% - 100%)    General:    Head:  normocephalic & atraumatic  Eyes:  extra-occular movement is intact  Pupils:  3-4 mm, symmetric, reactive to light  Ear, nose, throat:  mucosa is moist, dentition is normal  Neck:  supple  Respiratory:  normal effort, clear to auscultation bilaterally, no rales/ronchi/wheezing  Cardiac:  rate is normal, normal rhythm, no rubs/murmurs/gallops  Abdomen:  Soft, nondistended, nontender, +bowel sounds, no organomegaly, liver is not obviously enlarged  :  deferred  Skin:  normal moisture, turgor is normal, no rash  Neurologic:  no clonus, not tremor, Reflexes are normal, extremities are supple, cranial nerves II-XII intact, Level of consciousness is normal  Psychiatric:  Insight is intact, pt very depressed appearing (crying intermittently), on 1:1 for safety, alert and oriented x 3, Memory is intact, Affect is depressed    SIGNIFICANT LABORATORY STUDIES:                        11.9   8.31  )-----------( 304      ( 05 Aug 2018 16:10 )             36.7       08-05    140  |  102  |  5<L>  ----------------------------<  105<H>  3.6   |  25  |  0.86    Ca    9.3      05 Aug 2018 16:10    TPro  6.7  /  Alb  4.0  /  TBili  0.4  /  DBili  x   /  AST  14  /  ALT  11  /  AlkPhos  50          Aspirin Level: < 5.0<L>  08-05 @ 16:10  Acetaminophen Level:  < 15.0<L>   @ 16:10  Ethanol Level:  < 10   @ 16:10      ECG:  rate 73, NS rhythm, , QRS 82, QTc 423

## 2018-08-05 NOTE — ED BEHAVIORAL HEALTH ASSESSMENT NOTE - HPI (INCLUDE ILLNESS QUALITY, SEVERITY, DURATION, TIMING, CONTEXT, MODIFYING FACTORS, ASSOCIATED SIGNS AND SYMPTOMS)
47 y/o  woman, , domiciled with BF of 4 years, disabled, has an adult son who is living and a 2nd son who  of an opiate overdose at this time last year, with PPhx of major depressive disorder (pt states she was diagnosed with bipolar d/o 20 years but this dx has been switched to major depressive disorder by her outpatient psychiatrist), borderline PD, and cannabis abuse, 6 prior inpt admissions (last [facility] -), 5 prior suicide attempts by overdose several years ago, no reported hx of violence/legal problems, Pmhx of gastritis and migraine headaches. Patient self presents to the ED for suicide attempt x 3 days. Pt states she feels sad and depressed and does not want to live anymore. Pt took six to eight 100mg Trazadone pills with ten 0.1mg Klonopin pills within course of an hour last night. Pt has been taking same amount daily for past 3 days. She says she was hoping she wouldn't wake up, but also feels guilty if she were to die and leave her remaining son behind.     On interview pt is tearful, stating that since her son  of an opiate overdose last year she has been feeling increasingly guilty and blaming herself for his death. In addition, pt states her mother had  9 months prior to her son, so patient was grieving many losses at the same time. Patient was admitted to  - for depression and suicidal ideation and was discharged on Paxil. She reports feeling very tired on Paxil and her outpatient psychiatrist switched her to Viibryd about a couple weeks ago. She reports severe nausea, vomiting and diarrhea on Viibryd and self discontinued about 1 wk ago. She reports worsening depression, anhedonia, and having no motivation to get out of bed. She reports poor sleep, low appetite (only fluids for the past 4 days), poor energy level, and inability to concentrate. She requests inpt hospitalization for safety and stabilization. She denies HI/I/P or access to firearms. She denies all current/past manic and psychotic symptoms. She reports smoking TCH oil occasionally for the past couple months to try to cope with the depression and says that it makes her feel calm but she feels guilty about it because she never used drugs and was always a "good girl." She denies any other substance or alcohol use. 49 y/o  woman, , domiciled with BF of 4 years, disabled, has an adult son who is living and a 2nd son who  of an opiate overdose at this time last year, with PPhx of major depressive disorder (pt states she was diagnosed with bipolar d/o 20 years but this dx has been switched to major depressive disorder by her outpatient psychiatrist), borderline PD, and cannabis abuse, 6 prior inpt admissions (last [facility] -), 5 prior suicide attempts by overdose several years ago, no reported hx of violence/legal problems, Pmhx of gastritis and migraine headaches. Patient self presents to the ED for suicide attempt x 3 days. Pt states she feels sad and depressed and does not want to live anymore. Pt took six to eight 100mg Trazadone pills with ten 0.1mg Klonopin pills within course of an hour last night. Pt has been taking same amount daily for past 3 days. She says she was hoping she wouldn't wake up, but also feels guilty if she were to die and leave her remaining son behind.     On interview pt is tearful, stating that since her son  of an opiate overdose last year she has been feeling increasingly guilty and blaming herself for his death. In addition, pt states her mother had  9 months prior to her son, so patient was grieving many losses at the same time. Patient was admitted to  - for depression and suicidal ideation and was discharged on Paxil. She reports feeling very tired on Paxil and her outpatient psychiatrist switched her to Viibryd about a couple weeks ago. She reports severe nausea, vomiting and diarrhea on Viibryd and self discontinued about 1 wk ago. She reports worsening depression, anhedonia, and having no motivation to get out of bed. She reports poor sleep, low appetite (only fluids for the past 4 days), poor energy level, and inability to concentrate. She requests inpt hospitalization for safety and stabilization. She denies HI/I/P or access to firearms. She denies all current/past manic and psychotic symptoms. She reports smoking TCH oil occasionally for the past couple months to try to cope with the depression and says that it makes her feel calm but she feels guilty about it because she never used drugs and was always a "good girl." She denies any other substance or alcohol use.    Attempted to contact , no answer, left VM.

## 2018-08-05 NOTE — ED BEHAVIORAL HEALTH ASSESSMENT NOTE - DETAILS
see HPI Dr Garcia patient, her BF, and psychiatrist were informed grandmother with bipolar d/o, brother with schizophrenia, mother with depression. Denies suicide attempts "bloated" reports "issues" with men in the past, did not elaborate MATTHIAS patient informed patient informed, left VM for BF

## 2018-08-05 NOTE — ED BEHAVIORAL HEALTH ASSESSMENT NOTE - PAST PSYCHOTROPIC MEDICATION
remote trials of several medications (pt does not remember all of them). Recently pt was on Prozac and seroquel (this was stopped 1 year ago) remote trials of several medications (pt does not remember all of them). Recently pt was on Prozac and seroquel (this was stopped 1 year ago). most recently on Paxil (helpful but caused fatigue).

## 2018-08-05 NOTE — ED PROVIDER NOTE - ATTENDING CONTRIBUTION TO CARE
WILLI SHARPE MD: 47 yo F with a past medical history of Gastritis, MDD, SI/SA, and prior admission to [facility] secondary to suicide attempt x 3 days. Patient states she doesn't want to wake up and took 100mg Trazadone pills (6-8 tabs) last night with 1 mg (10 tabs) Klonopin over an hour last night along with THC vape. Patient states shes been taking similar dosing of pills x 3 days. Mother  2 year ago tomorrow and son  after that. Patient was inpatient at [facility] and on Paxil which caused drowsiness, was going to be switched to qHS but instead was d/c'ed and psychiatrist (pvt) started new anti-depressant Viibryd which the patient self d/c'ed secondary to N/V and upset stomach secondary to underlying gastritis.  Patient states she doesn't feel safe when she's alone.     PHYSICAL EXAM:    GENERAL: Patient is awake and alert and in no acute distress.  Non-toxic appearing.  A+Ox4  HEAD:  Airway patent.  No oropharyngeal edema.  No stridor.  Auricles are normal.    EYES: EOM grossly intact, PERRLA, conjunctiva non-injected and sclera clear  NECK: Supple, No vertebral point tenderness to palpation.  CHEST/LUNG: Lungs clear to auscultation bilaterally; no wheeze, no rhonchi,  no rales.    HEART: Regular rate and rhythm;   ABDOMEN: Soft, non-tender to palpation.  No rebound/no guarding.  Bowel sounds present x 4.   MSK/EXTREMITIES: No clubbing, cyanosis, or edema. Back is nontender, with no vertebral point tenderness to palpation, no CVAT.  Moving all 4 extremities.     NEURO: Neurologically grossly intact.  CN II-XII intact.  5/5 strength x 4 extremities.  Ambulatory without ataxia. No obvious deficits.   PSYCH: Psychiatrically crying with flat affect.  +risk to self.  Denies AH/VH.  No risk to others.      DR. DECKER, ATTENDING MD:    I performed a face to face bedside interview with patient regarding history of present illness, review of symptoms and past medical history. I completed an independent physical exam.  I have discussed patient's plan of care with the team of health care providers.   I agree with note as stated above, having amended the EMR as needed to reflect my findings. I have discussed the assessment and plan of care.  This includes during the time I functioned as the attending physician for this patient.

## 2018-08-06 PROCEDURE — 99223 1ST HOSP IP/OBS HIGH 75: CPT

## 2018-08-06 RX ORDER — DULOXETINE HYDROCHLORIDE 30 MG/1
30 CAPSULE, DELAYED RELEASE ORAL DAILY
Qty: 0 | Refills: 0 | Status: DISCONTINUED | OUTPATIENT
Start: 2018-08-07 | End: 2018-08-07

## 2018-08-06 RX ORDER — DULOXETINE HYDROCHLORIDE 30 MG/1
20 CAPSULE, DELAYED RELEASE ORAL ONCE
Qty: 0 | Refills: 0 | Status: COMPLETED | OUTPATIENT
Start: 2018-08-06 | End: 2018-08-06

## 2018-08-06 RX ADMIN — DULOXETINE HYDROCHLORIDE 20 MILLIGRAM(S): 30 CAPSULE, DELAYED RELEASE ORAL at 10:55

## 2018-08-06 RX ADMIN — FAMOTIDINE 20 MILLIGRAM(S): 10 INJECTION INTRAVENOUS at 08:58

## 2018-08-06 RX ADMIN — Medication 0.5 MILLIGRAM(S): at 08:58

## 2018-08-07 PROCEDURE — 99232 SBSQ HOSP IP/OBS MODERATE 35: CPT

## 2018-08-07 RX ORDER — ONDANSETRON 8 MG/1
4 TABLET, FILM COATED ORAL EVERY 6 HOURS
Qty: 0 | Refills: 0 | Status: DISCONTINUED | OUTPATIENT
Start: 2018-08-07 | End: 2018-08-10

## 2018-08-07 RX ORDER — CLONAZEPAM 1 MG
0.5 TABLET ORAL AT BEDTIME
Qty: 0 | Refills: 0 | Status: DISCONTINUED | OUTPATIENT
Start: 2018-08-07 | End: 2018-08-09

## 2018-08-07 RX ADMIN — Medication 650 MILLIGRAM(S): at 17:26

## 2018-08-07 RX ADMIN — Medication 650 MILLIGRAM(S): at 16:50

## 2018-08-07 RX ADMIN — FAMOTIDINE 20 MILLIGRAM(S): 10 INJECTION INTRAVENOUS at 09:05

## 2018-08-07 RX ADMIN — Medication 0.5 MILLIGRAM(S): at 09:06

## 2018-08-07 RX ADMIN — Medication 0.5 MILLIGRAM(S): at 21:17

## 2018-08-07 RX ADMIN — DULOXETINE HYDROCHLORIDE 30 MILLIGRAM(S): 30 CAPSULE, DELAYED RELEASE ORAL at 09:06

## 2018-08-07 RX ADMIN — FAMOTIDINE 20 MILLIGRAM(S): 10 INJECTION INTRAVENOUS at 21:17

## 2018-08-07 RX ADMIN — ONDANSETRON 4 MILLIGRAM(S): 8 TABLET, FILM COATED ORAL at 16:50

## 2018-08-08 PROCEDURE — 99232 SBSQ HOSP IP/OBS MODERATE 35: CPT

## 2018-08-08 RX ADMIN — Medication 0.5 MILLIGRAM(S): at 21:10

## 2018-08-08 RX ADMIN — FAMOTIDINE 20 MILLIGRAM(S): 10 INJECTION INTRAVENOUS at 09:46

## 2018-08-08 RX ADMIN — FAMOTIDINE 20 MILLIGRAM(S): 10 INJECTION INTRAVENOUS at 21:10

## 2018-08-08 RX ADMIN — Medication 650 MILLIGRAM(S): at 04:32

## 2018-08-08 RX ADMIN — Medication 650 MILLIGRAM(S): at 03:32

## 2018-08-09 PROBLEM — K29.70 GASTRITIS, UNSPECIFIED, WITHOUT BLEEDING: Chronic | Status: ACTIVE | Noted: 2018-08-05

## 2018-08-09 PROCEDURE — 99232 SBSQ HOSP IP/OBS MODERATE 35: CPT

## 2018-08-09 RX ORDER — FAMOTIDINE 10 MG/ML
1 INJECTION INTRAVENOUS
Qty: 28 | Refills: 0 | OUTPATIENT
Start: 2018-08-09 | End: 2018-08-22

## 2018-08-09 RX ORDER — CLONAZEPAM 1 MG
1 TABLET ORAL
Qty: 5 | Refills: 0 | OUTPATIENT
Start: 2018-08-09 | End: 2018-08-13

## 2018-08-09 RX ADMIN — FAMOTIDINE 20 MILLIGRAM(S): 10 INJECTION INTRAVENOUS at 21:36

## 2018-08-09 RX ADMIN — Medication 650 MILLIGRAM(S): at 21:36

## 2018-08-09 RX ADMIN — FAMOTIDINE 20 MILLIGRAM(S): 10 INJECTION INTRAVENOUS at 09:24

## 2018-08-09 RX ADMIN — Medication 650 MILLIGRAM(S): at 22:36

## 2018-08-10 VITALS — TEMPERATURE: 98 F

## 2018-08-10 PROCEDURE — 99238 HOSP IP/OBS DSCHRG MGMT 30/<: CPT

## 2018-08-10 RX ORDER — LANOLIN ALCOHOL/MO/W.PET/CERES
3 CREAM (GRAM) TOPICAL ONCE
Qty: 0 | Refills: 0 | Status: COMPLETED | OUTPATIENT
Start: 2018-08-10 | End: 2018-08-10

## 2018-08-10 RX ADMIN — Medication 3 MILLIGRAM(S): at 02:15

## 2018-08-10 RX ADMIN — FAMOTIDINE 20 MILLIGRAM(S): 10 INJECTION INTRAVENOUS at 09:06

## 2018-08-17 ENCOUNTER — OUTPATIENT (OUTPATIENT)
Dept: OUTPATIENT SERVICES | Facility: HOSPITAL | Age: 48
LOS: 1 days | Discharge: HOME | End: 2018-08-17
Payer: MEDICAID

## 2018-08-17 DIAGNOSIS — Z98.51 TUBAL LIGATION STATUS: Chronic | ICD-10-CM

## 2018-08-17 PROCEDURE — 90792 PSYCH DIAG EVAL W/MED SRVCS: CPT

## 2018-08-20 DIAGNOSIS — F43.29 ADJUSTMENT DISORDER WITH OTHER SYMPTOMS: ICD-10-CM

## 2018-08-20 DIAGNOSIS — F34.1 DYSTHYMIC DISORDER: ICD-10-CM

## 2018-08-20 DIAGNOSIS — F60.3 BORDERLINE PERSONALITY DISORDER: ICD-10-CM

## 2019-04-16 ENCOUNTER — INPATIENT (INPATIENT)
Facility: HOSPITAL | Age: 49
LOS: 5 days | Discharge: HOME | End: 2019-04-22
Attending: PSYCHIATRY & NEUROLOGY | Admitting: PSYCHIATRY & NEUROLOGY
Payer: MEDICAID

## 2019-04-16 VITALS
WEIGHT: 198.42 LBS | SYSTOLIC BLOOD PRESSURE: 145 MMHG | TEMPERATURE: 97 F | HEIGHT: 64 IN | HEART RATE: 103 BPM | OXYGEN SATURATION: 99 % | DIASTOLIC BLOOD PRESSURE: 91 MMHG

## 2019-04-16 DIAGNOSIS — F43.29 ADJUSTMENT DISORDER WITH OTHER SYMPTOMS: ICD-10-CM

## 2019-04-16 DIAGNOSIS — Z98.51 TUBAL LIGATION STATUS: Chronic | ICD-10-CM

## 2019-04-16 PROCEDURE — 90792 PSYCH DIAG EVAL W/MED SRVCS: CPT

## 2019-04-16 RX ORDER — LANOLIN ALCOHOL/MO/W.PET/CERES
5 CREAM (GRAM) TOPICAL AT BEDTIME
Qty: 0 | Refills: 0 | Status: DISCONTINUED | OUTPATIENT
Start: 2019-04-16 | End: 2019-04-22

## 2019-04-16 RX ORDER — TRAZODONE HCL 50 MG
100 TABLET ORAL AT BEDTIME
Qty: 0 | Refills: 0 | Status: DISCONTINUED | OUTPATIENT
Start: 2019-04-16 | End: 2019-04-22

## 2019-04-16 RX ORDER — HYDROXYZINE HCL 10 MG
50 TABLET ORAL EVERY 6 HOURS
Qty: 0 | Refills: 0 | Status: DISCONTINUED | OUTPATIENT
Start: 2019-04-16 | End: 2019-04-22

## 2019-04-16 RX ORDER — IBUPROFEN 200 MG
600 TABLET ORAL EVERY 12 HOURS
Qty: 0 | Refills: 0 | Status: DISCONTINUED | OUTPATIENT
Start: 2019-04-16 | End: 2019-04-16

## 2019-04-16 RX ORDER — PANTOPRAZOLE SODIUM 20 MG/1
40 TABLET, DELAYED RELEASE ORAL
Qty: 0 | Refills: 0 | Status: DISCONTINUED | OUTPATIENT
Start: 2019-04-16 | End: 2019-04-22

## 2019-04-16 RX ORDER — IBUPROFEN 200 MG
600 TABLET ORAL EVERY 12 HOURS
Qty: 0 | Refills: 0 | Status: DISCONTINUED | OUTPATIENT
Start: 2019-04-16 | End: 2019-04-22

## 2019-04-16 RX ADMIN — Medication 600 MILLIGRAM(S): at 22:45

## 2019-04-16 RX ADMIN — Medication 100 MILLIGRAM(S): at 22:09

## 2019-04-16 RX ADMIN — Medication 600 MILLIGRAM(S): at 21:45

## 2019-04-16 RX ADMIN — Medication 5 MILLIGRAM(S): at 22:09

## 2019-04-16 NOTE — CHART NOTE - NSCHARTNOTEFT_GEN_A_CORE
Screening Medical Evaluation  Patient Admitted from: [facility] admitting diagnosis: Adjustment disorder with other symptom    PAST MEDICAL & SURGICAL HISTORY:  Gastritis  Tubal Ligation  Depression  H/O tubal ligation        Allergies    No Known Allergies    Intolerances        Social History:     FAMILY HISTORY:  No pertinent family history in first degree relatives      MEDICATIONS  (STANDING):  melatonin. 5 milliGRAM(s) Oral at bedtime  pantoprazole    Tablet 40 milliGRAM(s) Oral before breakfast  traZODone 100 milliGRAM(s) Oral at bedtime    MEDICATIONS  (PRN):  hydrOXYzine hydrochloride 50 milliGRAM(s) Oral every 6 hours PRN Anxiety  ibuprofen  Tablet. 600 milliGRAM(s) Oral every 12 hours PRN Mild Pain (1 - 3), Moderate Pain (4 - 6)  LORazepam     Tablet 2 milliGRAM(s) Oral every 6 hours PRN Agitation  LORazepam   Injectable 2 milliGRAM(s) IntraMuscular once PRN severe agitation secondary to psychiatric illness      Vital Signs Last 24 Hrs  T(C): 36.3 (16 Apr 2019 16:27), Max: 36.3 (16 Apr 2019 16:27)  T(F): 97.3 (16 Apr 2019 16:27), Max: 97.3 (16 Apr 2019 16:27)  HR: 103 (16 Apr 2019 16:27) (103 - 103)  BP: 145/91 (16 Apr 2019 16:27) (145/91 - 145/91)  BP(mean): --  RR: --  SpO2: 99% (16 Apr 2019 16:27) (99% - 99%)  CAPILLARY BLOOD GLUCOSE            PHYSICAL EXAM:  GENERAL: NAD, well-developed  HEAD:  Atraumatic, Normocephalic  EYES: EOMI, PERRLA, conjunctiva and sclera clear  NECK: Supple, No JVD  CHEST/LUNG: Clear to auscultation bilaterally; No wheeze  HEART: Regular rate and rhythm; No murmurs, rubs, or gallops  ABDOMEN: Soft, Nontender, Nondistended; Bowel sounds present  EXTREMITIES:  2+ Peripheral Pulses, No clubbing, cyanosis, or edema  PSYCH: AAOx3  NEUROLOGY: non-focal  SKIN: In tact    LABS:                    RADIOLOGY & ADDITIONAL TESTS:    Assessment and Plan: 47 yo F with PMH of gastritis is admitted to [facility] with a primary psychiatric diagnosis of Adjustment disorder with other symptom. The pt currently denies having any medical complaints such as chest pain, sob, abdominal pain, n/v/d/c, or any problems with urination or bowel movements. The rest of her screening physical is unremarkable.    1.Adjustment disorder with other symptom-Plan: continue with meds as per primary psychiatric team

## 2019-04-17 LAB
ALBUMIN SERPL ELPH-MCNC: 4.3 G/DL — SIGNIFICANT CHANGE UP (ref 3.3–5)
ALP SERPL-CCNC: 60 U/L — SIGNIFICANT CHANGE UP (ref 40–120)
ALT FLD-CCNC: 24 U/L — SIGNIFICANT CHANGE UP (ref 4–33)
ANION GAP SERPL CALC-SCNC: 13 MMO/L — SIGNIFICANT CHANGE UP (ref 7–14)
APPEARANCE UR: CLEAR — SIGNIFICANT CHANGE UP
AST SERPL-CCNC: 20 U/L — SIGNIFICANT CHANGE UP (ref 4–32)
BACTERIA # UR AUTO: NEGATIVE — SIGNIFICANT CHANGE UP
BASOPHILS # BLD AUTO: 0.02 K/UL — SIGNIFICANT CHANGE UP (ref 0–0.2)
BASOPHILS NFR BLD AUTO: 0.3 % — SIGNIFICANT CHANGE UP (ref 0–2)
BILIRUB SERPL-MCNC: 0.8 MG/DL — SIGNIFICANT CHANGE UP (ref 0.2–1.2)
BILIRUB UR-MCNC: NEGATIVE — SIGNIFICANT CHANGE UP
BLOOD UR QL VISUAL: SIGNIFICANT CHANGE UP
BUN SERPL-MCNC: 8 MG/DL — SIGNIFICANT CHANGE UP (ref 7–23)
CALCIUM SERPL-MCNC: 9.4 MG/DL — SIGNIFICANT CHANGE UP (ref 8.4–10.5)
CHLORIDE SERPL-SCNC: 105 MMOL/L — SIGNIFICANT CHANGE UP (ref 98–107)
CHOLEST SERPL-MCNC: 157 MG/DL — SIGNIFICANT CHANGE UP (ref 120–199)
CO2 SERPL-SCNC: 22 MMOL/L — SIGNIFICANT CHANGE UP (ref 22–31)
COLOR SPEC: YELLOW — SIGNIFICANT CHANGE UP
CREAT SERPL-MCNC: 0.68 MG/DL — SIGNIFICANT CHANGE UP (ref 0.5–1.3)
EOSINOPHIL # BLD AUTO: 0.09 K/UL — SIGNIFICANT CHANGE UP (ref 0–0.5)
EOSINOPHIL NFR BLD AUTO: 1.5 % — SIGNIFICANT CHANGE UP (ref 0–6)
EPI CELLS # UR: SIGNIFICANT CHANGE UP
GLUCOSE SERPL-MCNC: 108 MG/DL — HIGH (ref 70–99)
GLUCOSE UR-MCNC: NEGATIVE — SIGNIFICANT CHANGE UP
HBA1C BLD-MCNC: 5.4 % — SIGNIFICANT CHANGE UP (ref 4–5.6)
HCG UR-SCNC: NEGATIVE — SIGNIFICANT CHANGE UP
HCT VFR BLD CALC: 43.7 % — SIGNIFICANT CHANGE UP (ref 34.5–45)
HDLC SERPL-MCNC: 45 MG/DL — SIGNIFICANT CHANGE UP (ref 45–65)
HGB BLD-MCNC: 14.6 G/DL — SIGNIFICANT CHANGE UP (ref 11.5–15.5)
IMM GRANULOCYTES NFR BLD AUTO: 0.2 % — SIGNIFICANT CHANGE UP (ref 0–1.5)
KETONES UR-MCNC: NEGATIVE — SIGNIFICANT CHANGE UP
LEUKOCYTE ESTERASE UR-ACNC: NEGATIVE — SIGNIFICANT CHANGE UP
LIPID PNL WITH DIRECT LDL SERPL: 116 MG/DL — SIGNIFICANT CHANGE UP
LYMPHOCYTES # BLD AUTO: 1.93 K/UL — SIGNIFICANT CHANGE UP (ref 1–3.3)
LYMPHOCYTES # BLD AUTO: 31.4 % — SIGNIFICANT CHANGE UP (ref 13–44)
MAGNESIUM SERPL-MCNC: 2 MG/DL — SIGNIFICANT CHANGE UP (ref 1.6–2.6)
MCHC RBC-ENTMCNC: 32.2 PG — SIGNIFICANT CHANGE UP (ref 27–34)
MCHC RBC-ENTMCNC: 33.4 % — SIGNIFICANT CHANGE UP (ref 32–36)
MCV RBC AUTO: 96.5 FL — SIGNIFICANT CHANGE UP (ref 80–100)
MONOCYTES # BLD AUTO: 0.54 K/UL — SIGNIFICANT CHANGE UP (ref 0–0.9)
MONOCYTES NFR BLD AUTO: 8.8 % — SIGNIFICANT CHANGE UP (ref 2–14)
NEUTROPHILS # BLD AUTO: 3.55 K/UL — SIGNIFICANT CHANGE UP (ref 1.8–7.4)
NEUTROPHILS NFR BLD AUTO: 57.8 % — SIGNIFICANT CHANGE UP (ref 43–77)
NITRITE UR-MCNC: NEGATIVE — SIGNIFICANT CHANGE UP
NRBC # FLD: 0 K/UL — SIGNIFICANT CHANGE UP (ref 0–0)
PH UR: 6.5 — SIGNIFICANT CHANGE UP (ref 5–8)
PLATELET # BLD AUTO: 327 K/UL — SIGNIFICANT CHANGE UP (ref 150–400)
PMV BLD: 10 FL — SIGNIFICANT CHANGE UP (ref 7–13)
POTASSIUM SERPL-MCNC: 4.4 MMOL/L — SIGNIFICANT CHANGE UP (ref 3.5–5.3)
POTASSIUM SERPL-SCNC: 4.4 MMOL/L — SIGNIFICANT CHANGE UP (ref 3.5–5.3)
PROT SERPL-MCNC: 7 G/DL — SIGNIFICANT CHANGE UP (ref 6–8.3)
PROT UR-MCNC: 20 — SIGNIFICANT CHANGE UP
RBC # BLD: 4.53 M/UL — SIGNIFICANT CHANGE UP (ref 3.8–5.2)
RBC # FLD: 12 % — SIGNIFICANT CHANGE UP (ref 10.3–14.5)
RBC CASTS # UR COMP ASSIST: SIGNIFICANT CHANGE UP (ref 0–?)
SODIUM SERPL-SCNC: 140 MMOL/L — SIGNIFICANT CHANGE UP (ref 135–145)
SP GR SPEC: 1.03 — SIGNIFICANT CHANGE UP (ref 1–1.04)
SP GR UR: 1.02 — SIGNIFICANT CHANGE UP (ref 1–1.03)
SQUAMOUS # UR AUTO: SIGNIFICANT CHANGE UP
TRIGL SERPL-MCNC: 73 MG/DL — SIGNIFICANT CHANGE UP (ref 10–149)
TSH SERPL-MCNC: 2.15 UIU/ML — SIGNIFICANT CHANGE UP (ref 0.27–4.2)
UROBILINOGEN FLD QL: NORMAL — SIGNIFICANT CHANGE UP
WBC # BLD: 6.14 K/UL — SIGNIFICANT CHANGE UP (ref 3.8–10.5)
WBC # FLD AUTO: 6.14 K/UL — SIGNIFICANT CHANGE UP (ref 3.8–10.5)
WBC UR QL: SIGNIFICANT CHANGE UP (ref 0–?)

## 2019-04-17 PROCEDURE — 99232 SBSQ HOSP IP/OBS MODERATE 35: CPT

## 2019-04-17 PROCEDURE — 93010 ELECTROCARDIOGRAM REPORT: CPT

## 2019-04-17 RX ADMIN — Medication 600 MILLIGRAM(S): at 17:51

## 2019-04-17 RX ADMIN — Medication 5 MILLIGRAM(S): at 21:26

## 2019-04-17 RX ADMIN — PANTOPRAZOLE SODIUM 40 MILLIGRAM(S): 20 TABLET, DELAYED RELEASE ORAL at 07:01

## 2019-04-17 RX ADMIN — Medication 100 MILLIGRAM(S): at 21:26

## 2019-04-17 RX ADMIN — Medication 600 MILLIGRAM(S): at 18:30

## 2019-04-17 RX ADMIN — Medication 2 MILLIGRAM(S): at 22:27

## 2019-04-18 PROCEDURE — 99232 SBSQ HOSP IP/OBS MODERATE 35: CPT

## 2019-04-18 PROCEDURE — 90853 GROUP PSYCHOTHERAPY: CPT

## 2019-04-18 RX ADMIN — PANTOPRAZOLE SODIUM 40 MILLIGRAM(S): 20 TABLET, DELAYED RELEASE ORAL at 09:17

## 2019-04-18 RX ADMIN — Medication 5 MILLIGRAM(S): at 21:52

## 2019-04-18 RX ADMIN — Medication 100 MILLIGRAM(S): at 21:52

## 2019-04-19 LAB
BACTERIA UR CULT: SIGNIFICANT CHANGE UP
SPECIMEN SOURCE: SIGNIFICANT CHANGE UP

## 2019-04-19 PROCEDURE — 99232 SBSQ HOSP IP/OBS MODERATE 35: CPT

## 2019-04-19 RX ADMIN — Medication 600 MILLIGRAM(S): at 14:35

## 2019-04-19 RX ADMIN — Medication 600 MILLIGRAM(S): at 13:40

## 2019-04-19 RX ADMIN — PANTOPRAZOLE SODIUM 40 MILLIGRAM(S): 20 TABLET, DELAYED RELEASE ORAL at 08:17

## 2019-04-19 RX ADMIN — Medication 5 MILLIGRAM(S): at 20:53

## 2019-04-19 RX ADMIN — Medication 100 MILLIGRAM(S): at 20:53

## 2019-04-20 PROCEDURE — 99232 SBSQ HOSP IP/OBS MODERATE 35: CPT

## 2019-04-20 RX ADMIN — Medication 100 MILLIGRAM(S): at 20:52

## 2019-04-20 RX ADMIN — Medication 5 MILLIGRAM(S): at 20:52

## 2019-04-20 RX ADMIN — PANTOPRAZOLE SODIUM 40 MILLIGRAM(S): 20 TABLET, DELAYED RELEASE ORAL at 08:30

## 2019-04-21 PROCEDURE — 99232 SBSQ HOSP IP/OBS MODERATE 35: CPT

## 2019-04-21 RX ADMIN — Medication 100 MILLIGRAM(S): at 21:26

## 2019-04-21 RX ADMIN — Medication 600 MILLIGRAM(S): at 05:11

## 2019-04-21 RX ADMIN — Medication 600 MILLIGRAM(S): at 06:10

## 2019-04-21 RX ADMIN — PANTOPRAZOLE SODIUM 40 MILLIGRAM(S): 20 TABLET, DELAYED RELEASE ORAL at 08:33

## 2019-04-21 RX ADMIN — Medication 5 MILLIGRAM(S): at 21:26

## 2019-04-22 VITALS — RESPIRATION RATE: 19 BRPM | TEMPERATURE: 98 F

## 2019-04-22 PROCEDURE — 99238 HOSP IP/OBS DSCHRG MGMT 30/<: CPT

## 2019-04-22 RX ORDER — PANTOPRAZOLE SODIUM 20 MG/1
1 TABLET, DELAYED RELEASE ORAL
Qty: 0 | Refills: 0 | COMMUNITY
Start: 2019-04-22

## 2019-04-22 RX ORDER — LANOLIN ALCOHOL/MO/W.PET/CERES
0 CREAM (GRAM) TOPICAL
Qty: 0 | Refills: 0 | COMMUNITY
Start: 2019-04-22

## 2019-04-22 RX ORDER — TRAZODONE HCL 50 MG
1 TABLET ORAL
Qty: 0 | Refills: 0 | DISCHARGE
Start: 2019-04-22

## 2019-04-22 RX ADMIN — PANTOPRAZOLE SODIUM 40 MILLIGRAM(S): 20 TABLET, DELAYED RELEASE ORAL at 08:47

## 2019-06-18 NOTE — ED BEHAVIORAL HEALTH ASSESSMENT NOTE - NS ED BHA COCAINE
Problem: Communication  Goal: The ability to communicate needs accurately and effectively will improve    Intervention: Educate patient and significant other/support system about the plan of care, procedures, treatments, medications and allow for questions  Kept family up to date of POC for the day. Discussed plans to provide trach care, sit at edge of bed with PT and start educating on giving medications through the button. Family was on board with plan. Time was provided for questions and concerns to be addressed.       Problem: Infection  Goal: Will remain free from infection    Intervention: Assess signs and symptoms of infection  Patient has remained afebrile throughout the day.       Problem: Bowel/Gastric:  Goal: Normal bowel function is maintained or improved  Patient had large BM    Problem: Knowledge Deficit  Goal: Knowledge of the prescribed therapeutic regimen will improve    Intervention: Discuss information regarding therpeutic regimen and document in education  Both grandmother and mother gave medications through the G-Button with RN guidance       Problem: Respiratory:  Goal: Respiratory status will improve    Intervention: Collaborate with respiratory therapist and Interdisciplinary Team on treatment measures to improve respiratory function  Patient tolerated transition to T-piece for 8 hours of shift and continued into night shift. No desaturations noted or increased WOB.          None known

## 2019-12-06 ENCOUNTER — INPATIENT (INPATIENT)
Facility: HOSPITAL | Age: 49
LOS: 5 days | Discharge: HOME | DRG: 472 | End: 2019-12-12
Attending: NEUROLOGICAL SURGERY | Admitting: NEUROLOGICAL SURGERY
Payer: MEDICAID

## 2019-12-06 ENCOUNTER — TRANSCRIPTION ENCOUNTER (OUTPATIENT)
Age: 49
End: 2019-12-06

## 2019-12-06 VITALS
SYSTOLIC BLOOD PRESSURE: 150 MMHG | TEMPERATURE: 99 F | OXYGEN SATURATION: 99 % | HEIGHT: 64 IN | DIASTOLIC BLOOD PRESSURE: 87 MMHG | WEIGHT: 199.96 LBS | HEART RATE: 94 BPM | RESPIRATION RATE: 16 BRPM

## 2019-12-06 DIAGNOSIS — M54.12 RADICULOPATHY, CERVICAL REGION: ICD-10-CM

## 2019-12-06 DIAGNOSIS — Z98.51 TUBAL LIGATION STATUS: Chronic | ICD-10-CM

## 2019-12-06 LAB
ALBUMIN SERPL ELPH-MCNC: 4.6 G/DL — SIGNIFICANT CHANGE UP (ref 3.3–5)
ALP SERPL-CCNC: 60 U/L — SIGNIFICANT CHANGE UP (ref 40–120)
ALT FLD-CCNC: 28 U/L — SIGNIFICANT CHANGE UP (ref 10–45)
ANION GAP SERPL CALC-SCNC: 13 MMOL/L — SIGNIFICANT CHANGE UP (ref 5–17)
APTT BLD: 28.7 SEC — SIGNIFICANT CHANGE UP (ref 27.5–36.3)
AST SERPL-CCNC: 24 U/L — SIGNIFICANT CHANGE UP (ref 10–40)
BASOPHILS # BLD AUTO: 0.03 K/UL — SIGNIFICANT CHANGE UP (ref 0–0.2)
BASOPHILS NFR BLD AUTO: 0.3 % — SIGNIFICANT CHANGE UP (ref 0–2)
BILIRUB SERPL-MCNC: 0.3 MG/DL — SIGNIFICANT CHANGE UP (ref 0.2–1.2)
BLD GP AB SCN SERPL QL: NEGATIVE — SIGNIFICANT CHANGE UP
BUN SERPL-MCNC: 6 MG/DL — LOW (ref 7–23)
CALCIUM SERPL-MCNC: 8.6 MG/DL — SIGNIFICANT CHANGE UP (ref 8.4–10.5)
CHLORIDE SERPL-SCNC: 104 MMOL/L — SIGNIFICANT CHANGE UP (ref 96–108)
CO2 SERPL-SCNC: 23 MMOL/L — SIGNIFICANT CHANGE UP (ref 22–31)
CREAT SERPL-MCNC: 0.72 MG/DL — SIGNIFICANT CHANGE UP (ref 0.5–1.3)
EOSINOPHIL # BLD AUTO: 0.06 K/UL — SIGNIFICANT CHANGE UP (ref 0–0.5)
EOSINOPHIL NFR BLD AUTO: 0.6 % — SIGNIFICANT CHANGE UP (ref 0–6)
GLUCOSE BLDC GLUCOMTR-MCNC: 124 MG/DL — HIGH (ref 70–99)
GLUCOSE SERPL-MCNC: 121 MG/DL — HIGH (ref 70–99)
HCG SERPL-ACNC: <2 MIU/ML — SIGNIFICANT CHANGE UP
HCT VFR BLD CALC: 39.3 % — SIGNIFICANT CHANGE UP (ref 34.5–45)
HGB BLD-MCNC: 13 G/DL — SIGNIFICANT CHANGE UP (ref 11.5–15.5)
IMM GRANULOCYTES NFR BLD AUTO: 0.2 % — SIGNIFICANT CHANGE UP (ref 0–1.5)
INR BLD: 0.98 RATIO — SIGNIFICANT CHANGE UP (ref 0.88–1.16)
LYMPHOCYTES # BLD AUTO: 1.84 K/UL — SIGNIFICANT CHANGE UP (ref 1–3.3)
LYMPHOCYTES # BLD AUTO: 18.8 % — SIGNIFICANT CHANGE UP (ref 13–44)
MCHC RBC-ENTMCNC: 31.2 PG — SIGNIFICANT CHANGE UP (ref 27–34)
MCHC RBC-ENTMCNC: 33.1 GM/DL — SIGNIFICANT CHANGE UP (ref 32–36)
MCV RBC AUTO: 94.2 FL — SIGNIFICANT CHANGE UP (ref 80–100)
MONOCYTES # BLD AUTO: 0.66 K/UL — SIGNIFICANT CHANGE UP (ref 0–0.9)
MONOCYTES NFR BLD AUTO: 6.7 % — SIGNIFICANT CHANGE UP (ref 2–14)
NEUTROPHILS # BLD AUTO: 7.17 K/UL — SIGNIFICANT CHANGE UP (ref 1.8–7.4)
NEUTROPHILS NFR BLD AUTO: 73.4 % — SIGNIFICANT CHANGE UP (ref 43–77)
NRBC # BLD: 0 /100 WBCS — SIGNIFICANT CHANGE UP (ref 0–0)
PLATELET # BLD AUTO: 341 K/UL — SIGNIFICANT CHANGE UP (ref 150–400)
POTASSIUM SERPL-MCNC: 4 MMOL/L — SIGNIFICANT CHANGE UP (ref 3.5–5.3)
POTASSIUM SERPL-SCNC: 4 MMOL/L — SIGNIFICANT CHANGE UP (ref 3.5–5.3)
PROT SERPL-MCNC: 7 G/DL — SIGNIFICANT CHANGE UP (ref 6–8.3)
PROTHROM AB SERPL-ACNC: 11.3 SEC — SIGNIFICANT CHANGE UP (ref 10–12.9)
RBC # BLD: 4.17 M/UL — SIGNIFICANT CHANGE UP (ref 3.8–5.2)
RBC # FLD: 12.3 % — SIGNIFICANT CHANGE UP (ref 10.3–14.5)
RH IG SCN BLD-IMP: POSITIVE — SIGNIFICANT CHANGE UP
SODIUM SERPL-SCNC: 140 MMOL/L — SIGNIFICANT CHANGE UP (ref 135–145)
WBC # BLD: 9.78 K/UL — SIGNIFICANT CHANGE UP (ref 3.8–10.5)
WBC # FLD AUTO: 9.78 K/UL — SIGNIFICANT CHANGE UP (ref 3.8–10.5)

## 2019-12-06 PROCEDURE — 72125 CT NECK SPINE W/O DYE: CPT | Mod: 26

## 2019-12-06 PROCEDURE — 99285 EMERGENCY DEPT VISIT HI MDM: CPT

## 2019-12-06 RX ORDER — ONDANSETRON 8 MG/1
4 TABLET, FILM COATED ORAL EVERY 6 HOURS
Refills: 0 | Status: DISCONTINUED | OUTPATIENT
Start: 2019-12-06 | End: 2019-12-08

## 2019-12-06 RX ORDER — INSULIN LISPRO 100/ML
VIAL (ML) SUBCUTANEOUS
Refills: 0 | Status: DISCONTINUED | OUTPATIENT
Start: 2019-12-06 | End: 2019-12-08

## 2019-12-06 RX ORDER — FAMOTIDINE 10 MG/ML
20 INJECTION INTRAVENOUS
Refills: 0 | Status: DISCONTINUED | OUTPATIENT
Start: 2019-12-06 | End: 2019-12-08

## 2019-12-06 RX ORDER — LANOLIN ALCOHOL/MO/W.PET/CERES
3 CREAM (GRAM) TOPICAL AT BEDTIME
Refills: 0 | Status: DISCONTINUED | OUTPATIENT
Start: 2019-12-06 | End: 2019-12-08

## 2019-12-06 RX ORDER — SODIUM CHLORIDE 9 MG/ML
3 INJECTION INTRAMUSCULAR; INTRAVENOUS; SUBCUTANEOUS EVERY 8 HOURS
Refills: 0 | Status: DISCONTINUED | OUTPATIENT
Start: 2019-12-06 | End: 2019-12-08

## 2019-12-06 RX ORDER — TRAZODONE HCL 50 MG
100 TABLET ORAL AT BEDTIME
Refills: 0 | Status: DISCONTINUED | OUTPATIENT
Start: 2019-12-06 | End: 2019-12-08

## 2019-12-06 RX ORDER — DEXTROSE 50 % IN WATER 50 %
25 SYRINGE (ML) INTRAVENOUS ONCE
Refills: 0 | Status: DISCONTINUED | OUTPATIENT
Start: 2019-12-06 | End: 2019-12-08

## 2019-12-06 RX ORDER — PANTOPRAZOLE SODIUM 20 MG/1
40 TABLET, DELAYED RELEASE ORAL
Refills: 0 | Status: DISCONTINUED | OUTPATIENT
Start: 2019-12-06 | End: 2019-12-06

## 2019-12-06 RX ORDER — DEXTROSE 50 % IN WATER 50 %
12.5 SYRINGE (ML) INTRAVENOUS ONCE
Refills: 0 | Status: DISCONTINUED | OUTPATIENT
Start: 2019-12-06 | End: 2019-12-08

## 2019-12-06 RX ORDER — INSULIN LISPRO 100/ML
VIAL (ML) SUBCUTANEOUS AT BEDTIME
Refills: 0 | Status: DISCONTINUED | OUTPATIENT
Start: 2019-12-06 | End: 2019-12-08

## 2019-12-06 RX ORDER — GLUCAGON INJECTION, SOLUTION 0.5 MG/.1ML
1 INJECTION, SOLUTION SUBCUTANEOUS ONCE
Refills: 0 | Status: DISCONTINUED | OUTPATIENT
Start: 2019-12-06 | End: 2019-12-08

## 2019-12-06 RX ORDER — OXYCODONE HYDROCHLORIDE 5 MG/1
5 TABLET ORAL ONCE
Refills: 0 | Status: DISCONTINUED | OUTPATIENT
Start: 2019-12-06 | End: 2019-12-06

## 2019-12-06 RX ORDER — SENNA PLUS 8.6 MG/1
2 TABLET ORAL AT BEDTIME
Refills: 0 | Status: DISCONTINUED | OUTPATIENT
Start: 2019-12-06 | End: 2019-12-08

## 2019-12-06 RX ORDER — POLYETHYLENE GLYCOL 3350 17 G/17G
17 POWDER, FOR SOLUTION ORAL DAILY
Refills: 0 | Status: DISCONTINUED | OUTPATIENT
Start: 2019-12-06 | End: 2019-12-08

## 2019-12-06 RX ORDER — CYCLOBENZAPRINE HYDROCHLORIDE 10 MG/1
5 TABLET, FILM COATED ORAL THREE TIMES A DAY
Refills: 0 | Status: DISCONTINUED | OUTPATIENT
Start: 2019-12-06 | End: 2019-12-08

## 2019-12-06 RX ORDER — ACETAMINOPHEN 500 MG
650 TABLET ORAL EVERY 6 HOURS
Refills: 0 | Status: DISCONTINUED | OUTPATIENT
Start: 2019-12-06 | End: 2019-12-08

## 2019-12-06 RX ORDER — OXYCODONE HYDROCHLORIDE 5 MG/1
10 TABLET ORAL EVERY 4 HOURS
Refills: 0 | Status: DISCONTINUED | OUTPATIENT
Start: 2019-12-06 | End: 2019-12-08

## 2019-12-06 RX ORDER — DEXAMETHASONE 0.5 MG/5ML
4 ELIXIR ORAL EVERY 6 HOURS
Refills: 0 | Status: DISCONTINUED | OUTPATIENT
Start: 2019-12-06 | End: 2019-12-08

## 2019-12-06 RX ORDER — DEXTROSE 50 % IN WATER 50 %
15 SYRINGE (ML) INTRAVENOUS ONCE
Refills: 0 | Status: DISCONTINUED | OUTPATIENT
Start: 2019-12-06 | End: 2019-12-08

## 2019-12-06 RX ORDER — OXYCODONE HYDROCHLORIDE 5 MG/1
5 TABLET ORAL EVERY 4 HOURS
Refills: 0 | Status: DISCONTINUED | OUTPATIENT
Start: 2019-12-06 | End: 2019-12-08

## 2019-12-06 RX ORDER — SODIUM CHLORIDE 9 MG/ML
1000 INJECTION, SOLUTION INTRAVENOUS
Refills: 0 | Status: DISCONTINUED | OUTPATIENT
Start: 2019-12-06 | End: 2019-12-08

## 2019-12-06 RX ADMIN — Medication 4 MILLIGRAM(S): at 23:14

## 2019-12-06 RX ADMIN — OXYCODONE HYDROCHLORIDE 5 MILLIGRAM(S): 5 TABLET ORAL at 16:08

## 2019-12-06 RX ADMIN — OXYCODONE HYDROCHLORIDE 10 MILLIGRAM(S): 5 TABLET ORAL at 20:39

## 2019-12-06 RX ADMIN — SODIUM CHLORIDE 3 MILLILITER(S): 9 INJECTION INTRAMUSCULAR; INTRAVENOUS; SUBCUTANEOUS at 21:38

## 2019-12-06 RX ADMIN — Medication 4 MILLIGRAM(S): at 18:38

## 2019-12-06 RX ADMIN — Medication 100 MILLIGRAM(S): at 21:11

## 2019-12-06 RX ADMIN — FAMOTIDINE 20 MILLIGRAM(S): 10 INJECTION INTRAVENOUS at 18:39

## 2019-12-06 RX ADMIN — OXYCODONE HYDROCHLORIDE 5 MILLIGRAM(S): 5 TABLET ORAL at 15:38

## 2019-12-06 RX ADMIN — CYCLOBENZAPRINE HYDROCHLORIDE 5 MILLIGRAM(S): 10 TABLET, FILM COATED ORAL at 20:38

## 2019-12-06 RX ADMIN — OXYCODONE HYDROCHLORIDE 10 MILLIGRAM(S): 5 TABLET ORAL at 21:15

## 2019-12-06 NOTE — CONSULT NOTE ADULT - SUBJECTIVE AND OBJECTIVE BOX
CHIEF COMPLAINT:Patient is a 49y old  Female who presents with a chief complaint of severe cervical stenosis (06 Dec 2019 16:21)      HISTORY OF PRESENT ILLNESS:    49 male with history as below , depression, history of suicidal ideations in the past has been having right arm pain now found to have severe c4-c6 stenosis planned for surgery  denies any chest pain, sob, palpitation, dizziness or syncope.       PAST MEDICAL & SURGICAL HISTORY:  Gastritis  Tubal Ligation  Depression  H/O tubal ligation          MEDICATIONS:        acetaminophen   Tablet .. 650 milliGRAM(s) Oral every 6 hours PRN  cyclobenzaprine 5 milliGRAM(s) Oral three times a day PRN  melatonin 3 milliGRAM(s) Oral at bedtime PRN  ondansetron Injectable 4 milliGRAM(s) IV Push every 6 hours PRN  oxyCODONE    IR 5 milliGRAM(s) Oral every 4 hours PRN  oxyCODONE    IR 10 milliGRAM(s) Oral every 4 hours PRN  traZODone 100 milliGRAM(s) Oral at bedtime    famotidine    Tablet 20 milliGRAM(s) Oral two times a day  polyethylene glycol 3350 17 Gram(s) Oral daily  senna 2 Tablet(s) Oral at bedtime PRN    dexAMETHasone  Injectable 4 milliGRAM(s) IV Push every 6 hours    sodium chloride 0.9% lock flush 3 milliLiter(s) IV Push every 8 hours      FAMILY HISTORY:  No pertinent family history in first degree relatives      Non-contributory    SOCIAL HISTORY:    No tobacco, drugs or etoh    Allergies    No Known Allergies    Intolerances    	    REVIEW OF SYSTEMS:  as above  The rest of the 14 points ROS reviewed and except above they are unremarkable.        PHYSICAL EXAM:  T(C): 36.8 (12-06-19 @ 14:48), Max: 37 (12-06-19 @ 12:16)  HR: 99 (12-06-19 @ 14:48) (94 - 99)  BP: 160/93 (12-06-19 @ 14:48) (150/87 - 160/93)  RR: 18 (12-06-19 @ 14:48) (16 - 18)  SpO2: 97% (12-06-19 @ 14:48) (97% - 99%)  Wt(kg): --  I&O's Summary  JVP: Normal  Neck: supple  Lung: clear   CV: S1 S2 , Murmur:  Abd: soft  Ext: No edema  neuro: Awake / alert  Psych: flat affect  Skin: normal    LABS/DATA:    TELEMETRY: 	    ECG:  	   	  CARDIAC MARKERS:                                      13.0   9.78  )-----------( 341      ( 06 Dec 2019 15:23 )             39.3     12-06    140  |  104  |  6<L>  ----------------------------<  121<H>  4.0   |  23  |  0.72    Ca    8.6      06 Dec 2019 15:23    TPro  7.0  /  Alb  4.6  /  TBili  0.3  /  DBili  x   /  AST  24  /  ALT  28  /  AlkPhos  60  12-06    proBNP:   Lipid Profile:   HgA1c:   TSH:

## 2019-12-06 NOTE — ED PROVIDER NOTE - CLINICAL SUMMARY MEDICAL DECISION MAKING FREE TEXT BOX
48 y/o F w/ PMH of depression and gastritis presenting w/ RUE weakness and abnormal MRI. Currently w/ pain but w/o neuro deficits. Neurosurgery aware of pt coming to ED. Requesting admission under Dr. Mcnulty. CT c-spine requested. Labs ordered. Analgesia PRN. Reassess the need for additional intervention as clinically indicated.

## 2019-12-06 NOTE — CONSULT NOTE ADULT - ASSESSMENT
HTN  likely pain related  will treat with low dose norvasc for now     Depression   on meds  consider psych eval     DVT proph  lovenox post op once deemed safe    Gi proph  add protonix given on steroid    preop  Based on current ACC/AHA guidelines, patient history and physical exam, the patient is considered to have low risk from CV standpoint  obtain EKG HTN  likely pain related  will treat with low dose norvasc for now     Depression   on meds  consider psych eval     DVT proph  lovenox post op once deemed safe    Gi proph  on Pepcid     preop  Based on current ACC/AHA guidelines, patient history and physical exam, the patient is considered to have low risk from CV standpoint  obtain EKG

## 2019-12-06 NOTE — H&P ADULT - NSHPPHYSICALEXAM_GEN_ALL_CORE
AOx3, FC  5/5 throughout  SILT  no clonus / no bolanos    C6 radiculopathy, hand clumsiness RUE  Gait normal

## 2019-12-06 NOTE — ED ADULT TRIAGE NOTE - CHIEF COMPLAINT QUOTE
right sided neck, arm and hand pain x several months   had MRI done and sent here for neurosurgery for "disc problem"

## 2019-12-06 NOTE — DISCHARGE NOTE NURSING/CASE MANAGEMENT/SOCIAL WORK - NSDCPEPTSTRK_GEN_ALL_CORE
Stroke warning signs and symptoms/Signs and symptoms of stroke/Stroke education booklet/Call 911 for stroke/Prescribed medications/Risk factors for stroke/Stroke support groups for patients, families, and friends/Need for follow up after discharge

## 2019-12-06 NOTE — ED ADULT NURSE NOTE - CHPI ED NUR SYMPTOMS NEG
no fever/no loss of consciousness/no vomiting/no confusion/no weakness/no change in level of consciousness/no nausea

## 2019-12-06 NOTE — ED PROVIDER NOTE - OBJECTIVE STATEMENT
48 y/o F w/ PMH depression, gastritis presenting w/ R arm pain. Pt presents w/ son. Pt referred in by neurologist for neurosurg eval. Pt has had chronic pain of RUE x2 years w/ associated intermittent weakness. Pain worsened over the past 4 months which prompted her to f/u w/ a neurologist. Neurologist had ordered MRI which was done this week and pt was called and told to come to the ED for neurosurg eval. Pt currently complaining of mild pain and mild decreased strength in R arm. Remaining extremities w/o pain or subjective weakness. Denies known trauma to the area. Denies fevers, chills, headache, dizziness, blurred vision, chest pain, cough, shortness of breath, abdominal pain, n/v/d/c, urinary symptoms, rash.

## 2019-12-06 NOTE — ED PROVIDER NOTE - PHYSICAL EXAMINATION
Gen: NAD, AOx3, able to make needs known, non-toxic  Head: NCAT  HEENT: EOMI, oral mucosa moist, normal conjunctiva  Lung: CTAB, no respiratory distress, no wheezes/rhonchi/rales B/L, speaking in full sentences  CV: RRR, no murmurs  Abd: soft, NTND, no guarding  MSK: no visible deformities. no midline spinal tenderness  Neuro: No focal sensory or motor deficits. str 5/5 x4 extremities  Skin: Warm, well perfused, no rash  Psych: normal affect

## 2019-12-06 NOTE — CONSULT NOTE ADULT - ASSESSMENT
49 f with     Cervical stenosis  - pain control  - steroids  - awaiting OR    BS control while on steroids    HTN control    Depression  - continue Rx    Gastritis  - PPI    EKG pending     No acute medical condition identified to postpone planned surgical intervention.     Haresh Dubois MD pager [phone]

## 2019-12-06 NOTE — ED ADULT NURSE NOTE - NSIMPLEMENTINTERV_GEN_ALL_ED
Implemented All Fall Risk Interventions:  [place] to call system. Call bell, personal items and telephone within reach. Instruct patient to call for assistance. Room bathroom lighting operational. Non-slip footwear when patient is off stretcher. Physically safe environment: no spills, clutter or unnecessary equipment. Stretcher in lowest position, wheels locked, appropriate side rails in place. Provide visual cue, wrist band, yellow gown, etc. Monitor gait and stability. Monitor for mental status changes and reorient to person, place, and time. Review medications for side effects contributing to fall risk. Reinforce activity limits and safety measures with patient and family.

## 2019-12-06 NOTE — H&P ADULT - ASSESSMENT
49F no major pmhx with neck pain with radiculopathy for several months found to have severe cervical stenosis worst at C4-6 with cord signal change.   - admit  - pain control  - Preop for OR tomorrow

## 2019-12-06 NOTE — DISCHARGE NOTE NURSING/CASE MANAGEMENT/SOCIAL WORK - PATIENT PORTAL LINK FT
You can access the FollowMyHealth Patient Portal offered by [facility] by registering at the following website: [email or web address]. By joining [facility]’s FollowMyHealth portal, you will also be able to view your health information using other applications (apps) compatible with our system.

## 2019-12-06 NOTE — CONSULT NOTE ADULT - SUBJECTIVE AND OBJECTIVE BOX
HPI:  49F no major pmhx with neck pain with radiculopathy found to have severe cervical stenosis worst at C4-6 with cord signal change. (06 Dec 2019 16:21)      PAST MEDICAL & SURGICAL HISTORY:  Gastritis  Tubal Ligation  Depression  H/O tubal ligation      Review of Systems:   CONSTITUTIONAL: No fever, weight loss, or fatigue  EYES: No eye pain, visual disturbances, or discharge  ENMT:  No difficulty hearing, tinnitus, vertigo; No sinus or throat pain  NECK: No pain or stiffness  BREASTS: No pain, masses, or nipple discharge  RESPIRATORY: No cough, wheezing, chills or hemoptysis; No shortness of breath  CARDIOVASCULAR: No chest pain, palpitations, dizziness, or leg swelling  GASTROINTESTINAL: No abdominal or epigastric pain. No nausea, vomiting, or hematemesis; No diarrhea or constipation. No melena or hematochezia.  GENITOURINARY: No dysuria, frequency, hematuria, or incontinence  NEUROLOGICAL: No headaches, memory loss, loss of strength, numbness, or tremors  SKIN: No itching, burning, rashes, or lesions   LYMPH NODES: No enlarged glands  ENDOCRINE: No heat or cold intolerance; No hair loss  MUSCULOSKELETAL: No joint pain or swelling; No muscle, back, or extremity pain  PSYCHIATRIC: No depression, anxiety, mood swings, or difficulty sleeping  HEME/LYMPH: No easy bruising, or bleeding gums  ALLERY AND IMMUNOLOGIC: No hives or eczema    Allergies    No Known Allergies    Intolerances        Social History:     FAMILY HISTORY:  No pertinent family history in first degree relatives      MEDICATIONS  (STANDING):  dexAMETHasone  Injectable 4 milliGRAM(s) IV Push every 6 hours  famotidine    Tablet 20 milliGRAM(s) Oral two times a day  polyethylene glycol 3350 17 Gram(s) Oral daily  sodium chloride 0.9% lock flush 3 milliLiter(s) IV Push every 8 hours  traZODone 100 milliGRAM(s) Oral at bedtime    MEDICATIONS  (PRN):  acetaminophen   Tablet .. 650 milliGRAM(s) Oral every 6 hours PRN Temp greater or equal to 38C (100.4F), Mild Pain (1 - 3)  cyclobenzaprine 5 milliGRAM(s) Oral three times a day PRN Muscle Spasm  melatonin 3 milliGRAM(s) Oral at bedtime PRN Insomnia  ondansetron Injectable 4 milliGRAM(s) IV Push every 6 hours PRN Nausea and/or Vomiting  oxyCODONE    IR 5 milliGRAM(s) Oral every 4 hours PRN Moderate Pain (4 - 6)  oxyCODONE    IR 10 milliGRAM(s) Oral every 4 hours PRN Severe Pain (7 - 10)  senna 2 Tablet(s) Oral at bedtime PRN Constipation        CAPILLARY BLOOD GLUCOSE        I&O's Summary      PHYSICAL EXAM:  GENERAL: NAD, well-developed  HEAD:  Atraumatic, Normocephalic  EYES: EOMI, PERRLA, conjunctiva and sclera clear  NECK: Supple, No JVD  CHEST/LUNG: Clear to auscultation bilaterally; No wheeze  HEART: Regular rate and rhythm; No murmurs, rubs, or gallops  ABDOMEN: Soft, Nontender, Nondistended; Bowel sounds present  EXTREMITIES:  2+ Peripheral Pulses, No clubbing, cyanosis, or edema  PSYCH: AAOx3  NEUROLOGY: non-focal  SKIN: No rashes or lesions    LABS:                        13.0   9.78  )-----------( 341      ( 06 Dec 2019 15:23 )             39.3     12-06    140  |  104  |  6<L>  ----------------------------<  121<H>  4.0   |  23  |  0.72    Ca    8.6      06 Dec 2019 15:23    TPro  7.0  /  Alb  4.6  /  TBili  0.3  /  DBili  x   /  AST  24  /  ALT  28  /  AlkPhos  60  12-06    PT/INR - ( 06 Dec 2019 15:23 )   PT: 11.3 sec;   INR: 0.98 ratio         PTT - ( 06 Dec 2019 15:23 )  PTT:28.7 sec          RADIOLOGY & ADDITIONAL TESTS:    Imaging Personally Reviewed:    Consultant(s) Notes Reviewed:      Care Discussed with Consultants/Other Providers:

## 2019-12-06 NOTE — H&P ADULT - HISTORY OF PRESENT ILLNESS
49F no major pmhx with neck pain with radiculopathy found to have severe cervical stenosis worst at C4-6 with cord signal change.

## 2019-12-06 NOTE — ED ADULT NURSE NOTE - CHPI ED NUR CONTEXT2
unknown Otolaryngologist Procedure Text (E): After obtaining clear surgical margins the patient was sent to otolaryngology for surgical repair.  The patient understands they will receive post-surgical care and follow-up from the referring physician's office.

## 2019-12-07 ENCOUNTER — TRANSCRIPTION ENCOUNTER (OUTPATIENT)
Age: 49
End: 2019-12-07

## 2019-12-07 LAB
GLUCOSE BLDC GLUCOMTR-MCNC: 130 MG/DL — HIGH (ref 70–99)
GLUCOSE BLDC GLUCOMTR-MCNC: 143 MG/DL — HIGH (ref 70–99)
GLUCOSE BLDC GLUCOMTR-MCNC: 155 MG/DL — HIGH (ref 70–99)
HBA1C BLD-MCNC: 5.6 % — SIGNIFICANT CHANGE UP (ref 4–5.6)

## 2019-12-07 PROCEDURE — 93010 ELECTROCARDIOGRAM REPORT: CPT

## 2019-12-07 PROCEDURE — 99232 SBSQ HOSP IP/OBS MODERATE 35: CPT | Mod: 57

## 2019-12-07 RX ORDER — ACETAMINOPHEN 500 MG
1000 TABLET ORAL ONCE
Refills: 0 | Status: COMPLETED | OUTPATIENT
Start: 2019-12-07 | End: 2019-12-07

## 2019-12-07 RX ADMIN — FAMOTIDINE 20 MILLIGRAM(S): 10 INJECTION INTRAVENOUS at 06:25

## 2019-12-07 RX ADMIN — Medication 4 MILLIGRAM(S): at 06:25

## 2019-12-07 RX ADMIN — FAMOTIDINE 20 MILLIGRAM(S): 10 INJECTION INTRAVENOUS at 19:11

## 2019-12-07 RX ADMIN — SODIUM CHLORIDE 3 MILLILITER(S): 9 INJECTION INTRAMUSCULAR; INTRAVENOUS; SUBCUTANEOUS at 13:21

## 2019-12-07 RX ADMIN — Medication 4 MILLIGRAM(S): at 23:35

## 2019-12-07 RX ADMIN — Medication 650 MILLIGRAM(S): at 13:00

## 2019-12-07 RX ADMIN — SODIUM CHLORIDE 3 MILLILITER(S): 9 INJECTION INTRAMUSCULAR; INTRAVENOUS; SUBCUTANEOUS at 06:12

## 2019-12-07 RX ADMIN — POLYETHYLENE GLYCOL 3350 17 GRAM(S): 17 POWDER, FOR SOLUTION ORAL at 13:02

## 2019-12-07 RX ADMIN — Medication 100 MILLIGRAM(S): at 21:22

## 2019-12-07 RX ADMIN — SODIUM CHLORIDE 3 MILLILITER(S): 9 INJECTION INTRAMUSCULAR; INTRAVENOUS; SUBCUTANEOUS at 22:00

## 2019-12-07 RX ADMIN — Medication 1000 MILLIGRAM(S): at 23:17

## 2019-12-07 RX ADMIN — Medication 650 MILLIGRAM(S): at 13:30

## 2019-12-07 RX ADMIN — Medication 4 MILLIGRAM(S): at 19:10

## 2019-12-07 RX ADMIN — Medication 4 MILLIGRAM(S): at 13:00

## 2019-12-07 RX ADMIN — Medication 400 MILLIGRAM(S): at 22:47

## 2019-12-07 NOTE — PROGRESS NOTE ADULT - ASSESSMENT
49 f with     Cervical stenosis  - pain control  - steroids  - awaiting OR    BS control while on steroids    HTN control    Depression  - continue Rx    Gastritis  - PPI    EKG SR NSST/T    No acute medical condition identified to postpone planned surgical intervention.     Haresh Dubois MD pager [phone]

## 2019-12-07 NOTE — PROGRESS NOTE ADULT - ASSESSMENT
49 yr old F w h/o depression with 2 yr h/o RUE radicular pain and decreased strength found to have C4-C5 stenosis    Plan    Neuro stable, Preop for cervical decompression in am  Med/cards clearance appreciated   Hold SQL  labs wnl

## 2019-12-07 NOTE — PROGRESS NOTE ADULT - SUBJECTIVE AND OBJECTIVE BOX
Patient is a 49y old  Female who presents with a chief complaint of severe cervical stenosis (06 Dec 2019 17:14)      SUBJECTIVE / OVERNIGHT EVENTS: feels better  Review of Systems  chest pain no  palpitations no  sob no  nausea no  headache no    MEDICATIONS  (STANDING):  dexAMETHasone  Injectable 4 milliGRAM(s) IV Push every 6 hours  dextrose 5%. 1000 milliLiter(s) (50 mL/Hr) IV Continuous <Continuous>  dextrose 50% Injectable 12.5 Gram(s) IV Push once  dextrose 50% Injectable 25 Gram(s) IV Push once  dextrose 50% Injectable 25 Gram(s) IV Push once  famotidine    Tablet 20 milliGRAM(s) Oral two times a day  insulin lispro (HumaLOG) corrective regimen sliding scale   SubCutaneous three times a day before meals  insulin lispro (HumaLOG) corrective regimen sliding scale   SubCutaneous at bedtime  polyethylene glycol 3350 17 Gram(s) Oral daily  sodium chloride 0.9% lock flush 3 milliLiter(s) IV Push every 8 hours  traZODone 100 milliGRAM(s) Oral at bedtime    MEDICATIONS  (PRN):  acetaminophen   Tablet .. 650 milliGRAM(s) Oral every 6 hours PRN Temp greater or equal to 38C (100.4F), Mild Pain (1 - 3)  cyclobenzaprine 5 milliGRAM(s) Oral three times a day PRN Muscle Spasm  dextrose 40% Gel 15 Gram(s) Oral once PRN Blood Glucose LESS THAN 70 milliGRAM(s)/deciliter  glucagon  Injectable 1 milliGRAM(s) IntraMuscular once PRN Glucose LESS THAN 70 milligrams/deciliter  melatonin 3 milliGRAM(s) Oral at bedtime PRN Insomnia  ondansetron Injectable 4 milliGRAM(s) IV Push every 6 hours PRN Nausea and/or Vomiting  oxyCODONE    IR 5 milliGRAM(s) Oral every 4 hours PRN Moderate Pain (4 - 6)  oxyCODONE    IR 10 milliGRAM(s) Oral every 4 hours PRN Severe Pain (7 - 10)  senna 2 Tablet(s) Oral at bedtime PRN Constipation      Vital Signs Last 24 Hrs  T(C): 37.1 (07 Dec 2019 07:26), Max: 37.1 (07 Dec 2019 07:26)  T(F): 98.8 (07 Dec 2019 07:26), Max: 98.8 (07 Dec 2019 07:26)  HR: 80 (07 Dec 2019 07:26) (76 - 99)  BP: 114/70 (07 Dec 2019 07:26) (106/68 - 160/93)  BP(mean): --  RR: 18 (07 Dec 2019 07:26) (16 - 18)  SpO2: 97% (07 Dec 2019 07:26) (93% - 99%)    PHYSICAL EXAM:  GENERAL: NAD, well-developed  HEAD:  Atraumatic, Normocephalic  EYES: EOMI, PERRLA, conjunctiva and sclera clear  NECK: Supple, No JVD  CHEST/LUNG: Clear to auscultation bilaterally; No wheeze  HEART: Regular rate and rhythm; No murmurs, rubs, or gallops  ABDOMEN: Soft, Nontender, Nondistended; Bowel sounds present  EXTREMITIES:  2+ Peripheral Pulses, No clubbing, cyanosis, or edema  PSYCH: AAOx3  NEUROLOGY: non-focal  SKIN: No rashes or lesions    LABS:                        13.0   9.78  )-----------( 341      ( 06 Dec 2019 15:23 )             39.3     12-06    140  |  104  |  6<L>  ----------------------------<  121<H>  4.0   |  23  |  0.72    Ca    8.6      06 Dec 2019 15:23    TPro  7.0  /  Alb  4.6  /  TBili  0.3  /  DBili  x   /  AST  24  /  ALT  28  /  AlkPhos  60  12-06    PT/INR - ( 06 Dec 2019 15:23 )   PT: 11.3 sec;   INR: 0.98 ratio         PTT - ( 06 Dec 2019 15:23 )  PTT:28.7 sec            RADIOLOGY & ADDITIONAL TESTS:    Imaging Personally Reviewed:    Consultant(s) Notes Reviewed:      Care Discussed with Consultants/Other Providers:

## 2019-12-07 NOTE — PROGRESS NOTE ADULT - SUBJECTIVE AND OBJECTIVE BOX
Subjective: Patient seen and examined. No new events except as noted.     SUBJECTIVE/ROS:  No chest pain, dyspnea, palpitation, or dizziness.       MEDICATIONS:  MEDICATIONS  (STANDING):  dexAMETHasone  Injectable 4 milliGRAM(s) IV Push every 6 hours  dextrose 5%. 1000 milliLiter(s) (50 mL/Hr) IV Continuous <Continuous>  dextrose 50% Injectable 12.5 Gram(s) IV Push once  dextrose 50% Injectable 25 Gram(s) IV Push once  dextrose 50% Injectable 25 Gram(s) IV Push once  famotidine    Tablet 20 milliGRAM(s) Oral two times a day  insulin lispro (HumaLOG) corrective regimen sliding scale   SubCutaneous three times a day before meals  insulin lispro (HumaLOG) corrective regimen sliding scale   SubCutaneous at bedtime  polyethylene glycol 3350 17 Gram(s) Oral daily  sodium chloride 0.9% lock flush 3 milliLiter(s) IV Push every 8 hours  traZODone 100 milliGRAM(s) Oral at bedtime      PHYSICAL EXAM:  T(C): 37.1 (12-07-19 @ 07:26), Max: 37.1 (12-07-19 @ 07:26)  HR: 80 (12-07-19 @ 07:26) (76 - 99)  BP: 114/70 (12-07-19 @ 07:26) (106/68 - 160/93)  RR: 18 (12-07-19 @ 07:26) (16 - 18)  SpO2: 97% (12-07-19 @ 07:26) (93% - 97%)  Wt(kg): --  I&O's Summary          JVP: Normal  Neck: supple  Lung: clear   CV: S1 S2 , Murmur:  Abd: soft  Ext: No edema  neuro: Awake / alert  Psych: flat affect  Skin: normal``    LABS/DATA:    CARDIAC MARKERS:                                13.0   9.78  )-----------( 341      ( 06 Dec 2019 15:23 )             39.3     12-06    140  |  104  |  6<L>  ----------------------------<  121<H>  4.0   |  23  |  0.72    Ca    8.6      06 Dec 2019 15:23    TPro  7.0  /  Alb  4.6  /  TBili  0.3  /  DBili  x   /  AST  24  /  ALT  28  /  AlkPhos  60  12-06    proBNP:   Lipid Profile:   HgA1c: Hemoglobin A1C, Whole Blood: 5.6 % (12-07 @ 09:58)    TSH:     TELE:  EKG:

## 2019-12-07 NOTE — PROGRESS NOTE ADULT - ASSESSMENT
HTN  cont current meds     Depression   on meds  consider psych eval     DVT proph  lovenox post op once deemed safe    Gi proph  on Pepcid     preop  Based on current ACC/AHA guidelines, patient history and physical exam, the patient is considered to have low risk from CV standpoint  normal EKG  no objection to proceed to OR

## 2019-12-07 NOTE — PROGRESS NOTE ADULT - SUBJECTIVE AND OBJECTIVE BOX
SUBJECTIVE:   R arm radicular pain resolved on steroids   OVERNIGHT EVENTS: none    Vital Signs Last 24 Hrs  T(C): 36.8 (07 Dec 2019 12:12), Max: 37.1 (07 Dec 2019 07:26)  T(F): 98.3 (07 Dec 2019 12:12), Max: 98.8 (07 Dec 2019 07:26)  HR: 94 (07 Dec 2019 12:12) (76 - 94)  BP: 124/74 (07 Dec 2019 12:12) (106/68 - 128/83)  BP(mean): --  RR: 18 (07 Dec 2019 12:12) (16 - 18)  SpO2: 99% (07 Dec 2019 12:12) (93% - 99%)    PHYSICAL EXAM:    Constitutional: No Acute Distress     Neurological: AOx3, Speech clear Following Commands, Moving all Extremities 5/5 except RUE HG 4/5      Pulmonary: Clear to Auscultation,     Cardiovascular: S1, S2, Regular rate and rhythm     Gastrointestinal: Soft, Non-tender, Non-distended     Extremities: No calf tenderness     LABS:                        13.0   9.78  )-----------( 341      ( 06 Dec 2019 15:23 )             39.3    12-06    140  |  104  |  6<L>  ----------------------------<  121<H>  4.0   |  23  |  0.72    Ca    8.6      06 Dec 2019 15:23    TPro  7.0  /  Alb  4.6  /  TBili  0.3  /  DBili  x   /  AST  24  /  ALT  28  /  AlkPhos  60  12-06  PT/INR - ( 06 Dec 2019 15:23 )   PT: 11.3 sec;   INR: 0.98 ratio   PTT:28.7 sec    IMAGING:         MEDICATIONS:    acetaminophen   Tablet .. 650 milliGRAM(s) Oral every 6 hours PRN Temp greater or equal to 38C (100.4F), Mild Pain (1 - 3)  cyclobenzaprine 5 milliGRAM(s) Oral three times a day PRN Muscle Spasm  melatonin 3 milliGRAM(s) Oral at bedtime PRN Insomnia  ondansetron Injectable 4 milliGRAM(s) IV Push every 6 hours PRN Nausea and/or Vomiting  oxyCODONE    IR 5 milliGRAM(s) Oral every 4 hours PRN Moderate Pain (4 - 6)  oxyCODONE    IR 10 milliGRAM(s) Oral every 4 hours PRN Severe Pain (7 - 10)  traZODone 100 milliGRAM(s) Oral at bedtime  famotidine    Tablet 20 milliGRAM(s) Oral two times a day  polyethylene glycol 3350 17 Gram(s) Oral daily  senna 2 Tablet(s) Oral at bedtime PRN Constipation  dexAMETHasone  Injectable 4 milliGRAM(s) IV Push every 6 hours  insulin lispro (HumaLOG) corrective regimen sliding scale   SubCutaneous three times a day before meals  insulin lispro (HumaLOG) corrective regimen sliding scale   SubCutaneous at bedtime  sodium chloride 0.9% lock flush 3 milliLiter(s) IV Push every 8 hours      DIET:     Assessment:  Please Check When Present   []  GCS  E   V  M     Heart Failure: []Acute, [] acute on chronic , []chronic  Heart Failure:  [] Diastolic (HFpEF), [] Systolic (HFrEF), []Combined (HFpEF and HFrEF), [] RHF, [] Pulm HTN, [] Other    [] SONIA, [] ATN, [] AIN, [] other  [] CKD1, [] CKD2, [] CKD 3, [] CKD 4, [] CKD 5, []ESRD    Encephalopathy: [] Metabolic, [] Hepatic, [] toxic, [] Neurological, [] Other    Abnormal Nurtitional Status: [] malnurtition (see nutrition note), [ ]underweight: BMI < 19, [] morbid obesity: BMI >40, [] Cachexia    [] Sepsis  [] hypovolemic shock,[] cardiogenic shock, [] hemorrhagic shock, [] neuogenic shock  [] Acute Respiratory Failure  []Cerebral edema, [] Brain compression/ herniation,   [] Functional quadriplegia  [] Acute blood loss anemia

## 2019-12-08 ENCOUNTER — APPOINTMENT (OUTPATIENT)
Dept: SPINE | Facility: HOSPITAL | Age: 49
End: 2019-12-08

## 2019-12-08 LAB
ANION GAP SERPL CALC-SCNC: 11 MMOL/L — SIGNIFICANT CHANGE UP (ref 5–17)
BUN SERPL-MCNC: 9 MG/DL — SIGNIFICANT CHANGE UP (ref 7–23)
CALCIUM SERPL-MCNC: 8.2 MG/DL — LOW (ref 8.4–10.5)
CHLORIDE SERPL-SCNC: 106 MMOL/L — SIGNIFICANT CHANGE UP (ref 96–108)
CO2 SERPL-SCNC: 23 MMOL/L — SIGNIFICANT CHANGE UP (ref 22–31)
CREAT SERPL-MCNC: 0.6 MG/DL — SIGNIFICANT CHANGE UP (ref 0.5–1.3)
GAS PNL BLDA: SIGNIFICANT CHANGE UP
GLUCOSE BLDC GLUCOMTR-MCNC: 119 MG/DL — HIGH (ref 70–99)
GLUCOSE BLDC GLUCOMTR-MCNC: 137 MG/DL — HIGH (ref 70–99)
GLUCOSE BLDC GLUCOMTR-MCNC: 138 MG/DL — HIGH (ref 70–99)
GLUCOSE BLDC GLUCOMTR-MCNC: 95 MG/DL — SIGNIFICANT CHANGE UP (ref 70–99)
GLUCOSE SERPL-MCNC: 151 MG/DL — HIGH (ref 70–99)
HCG SERPL-ACNC: <2 MIU/ML — SIGNIFICANT CHANGE UP
HCT VFR BLD CALC: 35.8 % — SIGNIFICANT CHANGE UP (ref 34.5–45)
HGB BLD-MCNC: 11.9 G/DL — SIGNIFICANT CHANGE UP (ref 11.5–15.5)
MAGNESIUM SERPL-MCNC: 2.3 MG/DL — SIGNIFICANT CHANGE UP (ref 1.6–2.6)
MCHC RBC-ENTMCNC: 31.5 PG — SIGNIFICANT CHANGE UP (ref 27–34)
MCHC RBC-ENTMCNC: 33.2 GM/DL — SIGNIFICANT CHANGE UP (ref 32–36)
MCV RBC AUTO: 94.7 FL — SIGNIFICANT CHANGE UP (ref 80–100)
NRBC # BLD: 0 /100 WBCS — SIGNIFICANT CHANGE UP (ref 0–0)
PHOSPHATE SERPL-MCNC: 3.7 MG/DL — SIGNIFICANT CHANGE UP (ref 2.5–4.5)
PLATELET # BLD AUTO: 351 K/UL — SIGNIFICANT CHANGE UP (ref 150–400)
POTASSIUM SERPL-MCNC: 4.2 MMOL/L — SIGNIFICANT CHANGE UP (ref 3.5–5.3)
POTASSIUM SERPL-SCNC: 4.2 MMOL/L — SIGNIFICANT CHANGE UP (ref 3.5–5.3)
RBC # BLD: 3.78 M/UL — LOW (ref 3.8–5.2)
RBC # FLD: 12.8 % — SIGNIFICANT CHANGE UP (ref 10.3–14.5)
SODIUM SERPL-SCNC: 140 MMOL/L — SIGNIFICANT CHANGE UP (ref 135–145)
WBC # BLD: 22.14 K/UL — HIGH (ref 3.8–10.5)
WBC # FLD AUTO: 22.14 K/UL — HIGH (ref 3.8–10.5)

## 2019-12-08 PROCEDURE — 99232 SBSQ HOSP IP/OBS MODERATE 35: CPT

## 2019-12-08 PROCEDURE — 22551 ARTHRD ANT NTRBDY CERVICAL: CPT

## 2019-12-08 PROCEDURE — 22845 INSERT SPINE FIXATION DEVICE: CPT | Mod: 59

## 2019-12-08 PROCEDURE — 22853 INSJ BIOMECHANICAL DEVICE: CPT

## 2019-12-08 RX ORDER — ALPRAZOLAM 0.25 MG
0.25 TABLET ORAL ONCE
Refills: 0 | Status: DISCONTINUED | OUTPATIENT
Start: 2019-12-08 | End: 2019-12-08

## 2019-12-08 RX ORDER — ACETAMINOPHEN 500 MG
1000 TABLET ORAL ONCE
Refills: 0 | Status: COMPLETED | OUTPATIENT
Start: 2019-12-08 | End: 2019-12-08

## 2019-12-08 RX ORDER — DEXTROSE 50 % IN WATER 50 %
25 SYRINGE (ML) INTRAVENOUS ONCE
Refills: 0 | Status: DISCONTINUED | OUTPATIENT
Start: 2019-12-08 | End: 2019-12-11

## 2019-12-08 RX ORDER — INSULIN LISPRO 100/ML
VIAL (ML) SUBCUTANEOUS
Refills: 0 | Status: DISCONTINUED | OUTPATIENT
Start: 2019-12-08 | End: 2019-12-11

## 2019-12-08 RX ORDER — DEXTROSE 50 % IN WATER 50 %
12.5 SYRINGE (ML) INTRAVENOUS ONCE
Refills: 0 | Status: DISCONTINUED | OUTPATIENT
Start: 2019-12-08 | End: 2019-12-11

## 2019-12-08 RX ORDER — SODIUM CHLORIDE 9 MG/ML
3 INJECTION INTRAMUSCULAR; INTRAVENOUS; SUBCUTANEOUS EVERY 8 HOURS
Refills: 0 | Status: DISCONTINUED | OUTPATIENT
Start: 2019-12-08 | End: 2019-12-12

## 2019-12-08 RX ORDER — CEFAZOLIN SODIUM 1 G
2000 VIAL (EA) INJECTION EVERY 8 HOURS
Refills: 0 | Status: COMPLETED | OUTPATIENT
Start: 2019-12-09 | End: 2019-12-09

## 2019-12-08 RX ORDER — DEXAMETHASONE 0.5 MG/5ML
4 ELIXIR ORAL EVERY 6 HOURS
Refills: 0 | Status: DISCONTINUED | OUTPATIENT
Start: 2019-12-08 | End: 2019-12-10

## 2019-12-08 RX ORDER — ONDANSETRON 8 MG/1
4 TABLET, FILM COATED ORAL EVERY 6 HOURS
Refills: 0 | Status: DISCONTINUED | OUTPATIENT
Start: 2019-12-08 | End: 2019-12-12

## 2019-12-08 RX ORDER — POLYETHYLENE GLYCOL 3350 17 G/17G
17 POWDER, FOR SOLUTION ORAL DAILY
Refills: 0 | Status: DISCONTINUED | OUTPATIENT
Start: 2019-12-08 | End: 2019-12-12

## 2019-12-08 RX ORDER — ALPRAZOLAM 0.25 MG
0.25 TABLET ORAL ONCE
Refills: 0 | Status: DISCONTINUED | OUTPATIENT
Start: 2019-12-08 | End: 2019-12-12

## 2019-12-08 RX ORDER — HYDROMORPHONE HYDROCHLORIDE 2 MG/ML
0.5 INJECTION INTRAMUSCULAR; INTRAVENOUS; SUBCUTANEOUS
Refills: 0 | Status: DISCONTINUED | OUTPATIENT
Start: 2019-12-08 | End: 2019-12-09

## 2019-12-08 RX ORDER — ACETAMINOPHEN 500 MG
650 TABLET ORAL EVERY 6 HOURS
Refills: 0 | Status: DISCONTINUED | OUTPATIENT
Start: 2019-12-08 | End: 2019-12-12

## 2019-12-08 RX ORDER — OXYCODONE HYDROCHLORIDE 5 MG/1
10 TABLET ORAL EVERY 4 HOURS
Refills: 0 | Status: DISCONTINUED | OUTPATIENT
Start: 2019-12-08 | End: 2019-12-08

## 2019-12-08 RX ORDER — SENNA PLUS 8.6 MG/1
2 TABLET ORAL AT BEDTIME
Refills: 0 | Status: DISCONTINUED | OUTPATIENT
Start: 2019-12-08 | End: 2019-12-10

## 2019-12-08 RX ORDER — INSULIN LISPRO 100/ML
VIAL (ML) SUBCUTANEOUS AT BEDTIME
Refills: 0 | Status: DISCONTINUED | OUTPATIENT
Start: 2019-12-08 | End: 2019-12-11

## 2019-12-08 RX ORDER — DIAZEPAM 5 MG
2 TABLET ORAL EVERY 8 HOURS
Refills: 0 | Status: DISCONTINUED | OUTPATIENT
Start: 2019-12-08 | End: 2019-12-09

## 2019-12-08 RX ORDER — LANOLIN ALCOHOL/MO/W.PET/CERES
3 CREAM (GRAM) TOPICAL AT BEDTIME
Refills: 0 | Status: DISCONTINUED | OUTPATIENT
Start: 2019-12-08 | End: 2019-12-12

## 2019-12-08 RX ORDER — TRAMADOL HYDROCHLORIDE 50 MG/1
25 TABLET ORAL EVERY 4 HOURS
Refills: 0 | Status: DISCONTINUED | OUTPATIENT
Start: 2019-12-08 | End: 2019-12-12

## 2019-12-08 RX ORDER — TRAZODONE HCL 50 MG
100 TABLET ORAL AT BEDTIME
Refills: 0 | Status: DISCONTINUED | OUTPATIENT
Start: 2019-12-08 | End: 2019-12-11

## 2019-12-08 RX ORDER — CYCLOBENZAPRINE HYDROCHLORIDE 10 MG/1
10 TABLET, FILM COATED ORAL THREE TIMES A DAY
Refills: 0 | Status: DISCONTINUED | OUTPATIENT
Start: 2019-12-08 | End: 2019-12-12

## 2019-12-08 RX ORDER — GLUCAGON INJECTION, SOLUTION 0.5 MG/.1ML
1 INJECTION, SOLUTION SUBCUTANEOUS ONCE
Refills: 0 | Status: DISCONTINUED | OUTPATIENT
Start: 2019-12-08 | End: 2019-12-11

## 2019-12-08 RX ORDER — DEXTROSE 50 % IN WATER 50 %
15 SYRINGE (ML) INTRAVENOUS ONCE
Refills: 0 | Status: DISCONTINUED | OUTPATIENT
Start: 2019-12-08 | End: 2019-12-11

## 2019-12-08 RX ORDER — SODIUM CHLORIDE 9 MG/ML
1000 INJECTION INTRAMUSCULAR; INTRAVENOUS; SUBCUTANEOUS
Refills: 0 | Status: DISCONTINUED | OUTPATIENT
Start: 2019-12-08 | End: 2019-12-09

## 2019-12-08 RX ORDER — SODIUM CHLORIDE 9 MG/ML
1000 INJECTION, SOLUTION INTRAVENOUS
Refills: 0 | Status: DISCONTINUED | OUTPATIENT
Start: 2019-12-08 | End: 2019-12-11

## 2019-12-08 RX ORDER — CYCLOBENZAPRINE HYDROCHLORIDE 10 MG/1
5 TABLET, FILM COATED ORAL THREE TIMES A DAY
Refills: 0 | Status: DISCONTINUED | OUTPATIENT
Start: 2019-12-08 | End: 2019-12-08

## 2019-12-08 RX ORDER — TRAMADOL HYDROCHLORIDE 50 MG/1
50 TABLET ORAL EVERY 4 HOURS
Refills: 0 | Status: DISCONTINUED | OUTPATIENT
Start: 2019-12-08 | End: 2019-12-12

## 2019-12-08 RX ORDER — FAMOTIDINE 10 MG/ML
20 INJECTION INTRAVENOUS
Refills: 0 | Status: DISCONTINUED | OUTPATIENT
Start: 2019-12-08 | End: 2019-12-12

## 2019-12-08 RX ORDER — OXYCODONE HYDROCHLORIDE 5 MG/1
5 TABLET ORAL EVERY 4 HOURS
Refills: 0 | Status: DISCONTINUED | OUTPATIENT
Start: 2019-12-08 | End: 2019-12-08

## 2019-12-08 RX ADMIN — Medication 4 MILLIGRAM(S): at 20:42

## 2019-12-08 RX ADMIN — SODIUM CHLORIDE 3 MILLILITER(S): 9 INJECTION INTRAMUSCULAR; INTRAVENOUS; SUBCUTANEOUS at 21:40

## 2019-12-08 RX ADMIN — Medication 100 MILLIGRAM(S): at 21:57

## 2019-12-08 RX ADMIN — SODIUM CHLORIDE 3 MILLILITER(S): 9 INJECTION INTRAMUSCULAR; INTRAVENOUS; SUBCUTANEOUS at 06:00

## 2019-12-08 RX ADMIN — SODIUM CHLORIDE 75 MILLILITER(S): 9 INJECTION INTRAMUSCULAR; INTRAVENOUS; SUBCUTANEOUS at 20:51

## 2019-12-08 RX ADMIN — Medication 4 MILLIGRAM(S): at 11:53

## 2019-12-08 RX ADMIN — HYDROMORPHONE HYDROCHLORIDE 0.5 MILLIGRAM(S): 2 INJECTION INTRAMUSCULAR; INTRAVENOUS; SUBCUTANEOUS at 19:05

## 2019-12-08 RX ADMIN — FAMOTIDINE 20 MILLIGRAM(S): 10 INJECTION INTRAVENOUS at 21:57

## 2019-12-08 RX ADMIN — CYCLOBENZAPRINE HYDROCHLORIDE 10 MILLIGRAM(S): 10 TABLET, FILM COATED ORAL at 19:27

## 2019-12-08 RX ADMIN — Medication 400 MILLIGRAM(S): at 10:56

## 2019-12-08 RX ADMIN — Medication 1000 MILLIGRAM(S): at 11:00

## 2019-12-08 RX ADMIN — SODIUM CHLORIDE 3 MILLILITER(S): 9 INJECTION INTRAMUSCULAR; INTRAVENOUS; SUBCUTANEOUS at 11:47

## 2019-12-08 RX ADMIN — Medication 0.25 MILLIGRAM(S): at 10:03

## 2019-12-08 RX ADMIN — SODIUM CHLORIDE 75 MILLILITER(S): 9 INJECTION INTRAMUSCULAR; INTRAVENOUS; SUBCUTANEOUS at 19:08

## 2019-12-08 RX ADMIN — HYDROMORPHONE HYDROCHLORIDE 0.5 MILLIGRAM(S): 2 INJECTION INTRAMUSCULAR; INTRAVENOUS; SUBCUTANEOUS at 19:15

## 2019-12-08 RX ADMIN — OXYCODONE HYDROCHLORIDE 10 MILLIGRAM(S): 5 TABLET ORAL at 20:40

## 2019-12-08 RX ADMIN — FAMOTIDINE 20 MILLIGRAM(S): 10 INJECTION INTRAVENOUS at 06:27

## 2019-12-08 RX ADMIN — OXYCODONE HYDROCHLORIDE 10 MILLIGRAM(S): 5 TABLET ORAL at 21:10

## 2019-12-08 RX ADMIN — Medication 4 MILLIGRAM(S): at 06:27

## 2019-12-08 NOTE — PRE-ANESTHESIA EVALUATION ADULT - NSANTHPMHFT_GEN_ALL_CORE
- per h/p - 49F no major pmhx with neck pain with radiculopathy found to have severe cervical stenosis worst at C4-6 with cord signal change.

## 2019-12-08 NOTE — PROGRESS NOTE ADULT - ASSESSMENT
49 f with     Cervical stenosis  - pain control  - steroids  - awaiting OR    BS control while on steroids    HTN control    Depression  - continue Rx    Gastritis  - PPI    EKG SR NSST/T    No acute medical condition identified to postpone planned surgical intervention.     d/w patient and family    Haresh Dubois MD pager [phone]

## 2019-12-08 NOTE — PROGRESS NOTE ADULT - SUBJECTIVE AND OBJECTIVE BOX
SUMMARY: s/p C4-5 ACDF    OVERNIGHT EVENTS: stable exam in PACU    REVIEW OF SYSTEMS: REVIEW OF SYSTEMS: [] Unable to Assess due to neurologic exam   [ x] All ROS addressed below are non-contributory, except: post surgical anterior neck pain/spasm  Neuro: [ ] Headache [ ] Back pain [ ] Numbness [ ] Weakness [ ] Ataxia [ ] Dizziness [ ] Aphasia [ ] Dysarthria [ ] Visual disturbance  Resp: [ ] Shortness of breath/dyspnea [ ] Orthopnea [ ] Cough  CV: [ ] Chest pain [ ] Palpitation [ ] Lightheadedness [ ] Syncope  Renal: [ ] Thirst [ ] Edema  GI: [ ] Nausea [ ] Emesis [ ] Abdominal pain [ ] Constipation [ ] Diarrhea  Hem: [ ] Hematemesis [ ] bBright red blood per rectum  ID: [ ] Fever [ ] Chills [ ] Dysuria  ENT: [ ] Rhinorrhea    VITALS: [x] Reviewed    IMAGING/DATA: [x] Reviewed    IVF FLUIDS/MEDICATIONS: [x] Reviewed    ALLERGIES: Allergies    No Known Allergies    Intolerances        DEVICES:   [] Restraints [x] PIVs [] ET tube [] central line [] PICC [x] arterial line [x] miller [] NGT/OGT [] EVD [] LD [] RUCHI/HMV [] LiCOX [] ICP monitor [] Trach [] PEG [] Chest Tube [] other:    EXAMINATION:  General: No acute distress  HEENT: Anicteric sclerae  Cardiac: [id number]  Lungs: Clear, comfortable breathing, no stridor  Abdomen: Soft, non-tender, +BS  Extremities: No c/c/e  Skin/Incision Site: Clean, dry and intact, no hemotoma  Neurologic: Awake, alert, fully oriented, follows commands, PERRL, VFFtc, EOMI, face symmetric, tongue midline, no drift, full strength except for HG 4+/5 b/l

## 2019-12-08 NOTE — PROGRESS NOTE ADULT - ASSESSMENT
ASSESSMENT/PLAN: ACDF POD#0    NEURO:  pain control  Activity: [x] mobilize as tolerated [] Bedrest [x] PT [x] OT [] PMNR    PULM:  encourage incentive spirometry as able    CV:   SBP goal 100-160    RENAL:  Fluids: IVL    GI:  Diet: regular  GI prophylaxis [] not indicated [x] PPI --home med[] other:  Bowel regimen [] colace [x] senna [x] other: miralax    ENDO:   Goal euglycemia (-180)    HEME/ONC:  VTE prophylaxis: [x] SCDs [x] chemoprophylaxis start pod#1 [] hold chemoprophylaxis due to: [] high risk of DVT/PE on admission due to:    ID: monitor for fevers    MISC:    SOCIAL/FAMILY:  [x] awaiting [] updated at bedside [] family meeting    CODE STATUS:  [x] Full Code [] DNR [] DNI [] Palliative/Comfort Care    DISPOSITION:  [] ICU [] Stroke Unit [x] Floor [] EMU [] RCU [] PCU

## 2019-12-08 NOTE — PRE-ANESTHESIA EVALUATION ADULT - NSANTHOSAYNRD_GEN_A_CORE
No. RIANNA screening performed.  STOP BANG Legend: 0-2 = LOW Risk; 3-4 = INTERMEDIATE Risk; 5-8 = HIGH Risk

## 2019-12-08 NOTE — PROGRESS NOTE ADULT - ASSESSMENT
HTN  cont current meds     Depression   on meds  consider psych eval as pt is anxious     DVT proph  lovenox post op once deemed safe    Gi proph  on Pepcid     preop  Based on current ACC/AHA guidelines, patient history and physical exam, the patient is considered to have low risk from CV standpoint  normal EKG  no objection to proceed to OR

## 2019-12-08 NOTE — PROGRESS NOTE ADULT - SUBJECTIVE AND OBJECTIVE BOX
Patient is a 49y old  Female who presents with a chief complaint of severe cervical stenosis (08 Dec 2019 08:07)      SUBJECTIVE / OVERNIGHT EVENTS: No new complaints. Family at bedside.   Review of Systems  chest pain no  palpitations no  sob no  nausea no  headache no    MEDICATIONS  (STANDING):  ALPRAZolam 0.25 milliGRAM(s) Oral once  dexAMETHasone  Injectable 4 milliGRAM(s) IV Push every 6 hours  dextrose 5%. 1000 milliLiter(s) (50 mL/Hr) IV Continuous <Continuous>  dextrose 50% Injectable 12.5 Gram(s) IV Push once  dextrose 50% Injectable 25 Gram(s) IV Push once  dextrose 50% Injectable 25 Gram(s) IV Push once  famotidine    Tablet 20 milliGRAM(s) Oral two times a day  insulin lispro (HumaLOG) corrective regimen sliding scale   SubCutaneous three times a day before meals  insulin lispro (HumaLOG) corrective regimen sliding scale   SubCutaneous at bedtime  polyethylene glycol 3350 17 Gram(s) Oral daily  sodium chloride 0.9% lock flush 3 milliLiter(s) IV Push every 8 hours  sodium chloride 0.9%. 1000 milliLiter(s) (75 mL/Hr) IV Continuous <Continuous>  traZODone 100 milliGRAM(s) Oral at bedtime    MEDICATIONS  (PRN):  acetaminophen   Tablet .. 650 milliGRAM(s) Oral every 6 hours PRN Temp greater or equal to 38C (100.4F), Mild Pain (1 - 3)  cyclobenzaprine 10 milliGRAM(s) Oral three times a day PRN Muscle Spasm  dextrose 40% Gel 15 Gram(s) Oral once PRN Blood Glucose LESS THAN 70 milliGRAM(s)/deciliter  glucagon  Injectable 1 milliGRAM(s) IntraMuscular once PRN Glucose LESS THAN 70 milligrams/deciliter  HYDROmorphone  Injectable 0.5 milliGRAM(s) IV Push every 10 minutes PRN Moderate Pain (4 - 6)  melatonin 3 milliGRAM(s) Oral at bedtime PRN Insomnia  ondansetron Injectable 4 milliGRAM(s) IV Push every 6 hours PRN Nausea and/or Vomiting  oxyCODONE    IR 5 milliGRAM(s) Oral every 4 hours PRN Moderate Pain (4 - 6)  oxyCODONE    IR 10 milliGRAM(s) Oral every 4 hours PRN Severe Pain (7 - 10)  senna 2 Tablet(s) Oral at bedtime PRN Constipation      Vital Signs Last 24 Hrs  T(C): 36.1 (08 Dec 2019 17:50), Max: 37.1 (08 Dec 2019 11:34)  T(F): 97 (08 Dec 2019 17:50), Max: 98.7 (08 Dec 2019 11:34)  HR: 81 (08 Dec 2019 19:30) (64 - 93)  BP: 137/77 (08 Dec 2019 19:00) (111/72 - 137/77)  BP(mean): 97 (08 Dec 2019 19:00) (93 - 97)  RR: 17 (08 Dec 2019 19:30) (16 - 18)  SpO2: 97% (08 Dec 2019 19:30) (94% - 98%)    PHYSICAL EXAM:  GENERAL: NAD, well-developed  HEAD:  Atraumatic, Normocephalic  EYES: EOMI, PERRLA, conjunctiva and sclera clear  NECK: Supple, No JVD  CHEST/LUNG: Clear to auscultation bilaterally; No wheeze  HEART: Regular rate and rhythm; No murmurs, rubs, or gallops  ABDOMEN: Soft, Nontender, Nondistended; Bowel sounds present  EXTREMITIES:  2+ Peripheral Pulses, No clubbing, cyanosis, or edema  PSYCH: AAOx3  NEUROLOGY: non-focal  SKIN: No rashes or lesions    LABS:                      RADIOLOGY & ADDITIONAL TESTS:    Imaging Personally Reviewed:    Consultant(s) Notes Reviewed:      Care Discussed with Consultants/Other Providers:

## 2019-12-08 NOTE — PROGRESS NOTE ADULT - SUBJECTIVE AND OBJECTIVE BOX
Subjective: Patient seen and examined. No new events except as noted.     SUBJECTIVE/ROS:  No chest pain, dyspnea, palpitation, or dizziness.       MEDICATIONS:  MEDICATIONS  (STANDING):  ALPRAZolam 0.25 milliGRAM(s) Oral once  dexAMETHasone  Injectable 4 milliGRAM(s) IV Push every 6 hours  dextrose 5%. 1000 milliLiter(s) (50 mL/Hr) IV Continuous <Continuous>  dextrose 50% Injectable 12.5 Gram(s) IV Push once  dextrose 50% Injectable 25 Gram(s) IV Push once  dextrose 50% Injectable 25 Gram(s) IV Push once  famotidine    Tablet 20 milliGRAM(s) Oral two times a day  insulin lispro (HumaLOG) corrective regimen sliding scale   SubCutaneous three times a day before meals  insulin lispro (HumaLOG) corrective regimen sliding scale   SubCutaneous at bedtime  polyethylene glycol 3350 17 Gram(s) Oral daily  sodium chloride 0.9% lock flush 3 milliLiter(s) IV Push every 8 hours  traZODone 100 milliGRAM(s) Oral at bedtime      PHYSICAL EXAM:  T(C): 36.7 (12-08-19 @ 05:13), Max: 37.1 (12-07-19 @ 15:51)  HR: 74 (12-08-19 @ 05:13) (74 - 94)  BP: 116/77 (12-08-19 @ 05:13) (116/77 - 124/74)  RR: 18 (12-08-19 @ 05:13) (18 - 18)  SpO2: 94% (12-08-19 @ 05:13) (94% - 99%)  Wt(kg): --  I&O's Summary          JVP: Normal  Neck: supple  Lung: clear   CV: S1 S2 , Murmur:  Abd: soft  Ext: No edema  neuro: Awake / alert  Psych: flat affect  Skin: normal``    LABS/DATA:    CARDIAC MARKERS:                                13.0   9.78  )-----------( 341      ( 06 Dec 2019 15:23 )             39.3     12-06    140  |  104  |  6<L>  ----------------------------<  121<H>  4.0   |  23  |  0.72    Ca    8.6      06 Dec 2019 15:23    TPro  7.0  /  Alb  4.6  /  TBili  0.3  /  DBili  x   /  AST  24  /  ALT  28  /  AlkPhos  60  12-06    proBNP:   Lipid Profile:   HgA1c: Hemoglobin A1C, Whole Blood: 5.6 % (12-07 @ 09:58)    TSH:     TELE:  EKG:

## 2019-12-09 LAB
ANION GAP SERPL CALC-SCNC: 9 MMOL/L — SIGNIFICANT CHANGE UP (ref 5–17)
BUN SERPL-MCNC: 9 MG/DL — SIGNIFICANT CHANGE UP (ref 7–23)
CALCIUM SERPL-MCNC: 8.2 MG/DL — LOW (ref 8.4–10.5)
CHLORIDE SERPL-SCNC: 103 MMOL/L — SIGNIFICANT CHANGE UP (ref 96–108)
CO2 SERPL-SCNC: 24 MMOL/L — SIGNIFICANT CHANGE UP (ref 22–31)
CREAT SERPL-MCNC: 0.53 MG/DL — SIGNIFICANT CHANGE UP (ref 0.5–1.3)
GLUCOSE BLDC GLUCOMTR-MCNC: 114 MG/DL — HIGH (ref 70–99)
GLUCOSE BLDC GLUCOMTR-MCNC: 125 MG/DL — HIGH (ref 70–99)
GLUCOSE BLDC GLUCOMTR-MCNC: 139 MG/DL — HIGH (ref 70–99)
GLUCOSE BLDC GLUCOMTR-MCNC: 145 MG/DL — HIGH (ref 70–99)
GLUCOSE SERPL-MCNC: 141 MG/DL — HIGH (ref 70–99)
HCT VFR BLD CALC: 38.6 % — SIGNIFICANT CHANGE UP (ref 34.5–45)
HGB BLD-MCNC: 12.4 G/DL — SIGNIFICANT CHANGE UP (ref 11.5–15.5)
MAGNESIUM SERPL-MCNC: 2.2 MG/DL — SIGNIFICANT CHANGE UP (ref 1.6–2.6)
MCHC RBC-ENTMCNC: 31.3 PG — SIGNIFICANT CHANGE UP (ref 27–34)
MCHC RBC-ENTMCNC: 32.1 GM/DL — SIGNIFICANT CHANGE UP (ref 32–36)
MCV RBC AUTO: 97.5 FL — SIGNIFICANT CHANGE UP (ref 80–100)
PHOSPHATE SERPL-MCNC: 2.6 MG/DL — SIGNIFICANT CHANGE UP (ref 2.5–4.5)
PLATELET # BLD AUTO: 348 K/UL — SIGNIFICANT CHANGE UP (ref 150–400)
POTASSIUM SERPL-MCNC: 4.5 MMOL/L — SIGNIFICANT CHANGE UP (ref 3.5–5.3)
POTASSIUM SERPL-SCNC: 4.5 MMOL/L — SIGNIFICANT CHANGE UP (ref 3.5–5.3)
RBC # BLD: 3.96 M/UL — SIGNIFICANT CHANGE UP (ref 3.8–5.2)
RBC # FLD: 12.5 % — SIGNIFICANT CHANGE UP (ref 10.3–14.5)
SODIUM SERPL-SCNC: 136 MMOL/L — SIGNIFICANT CHANGE UP (ref 135–145)
WBC # BLD: 22.51 K/UL — HIGH (ref 3.8–10.5)
WBC # FLD AUTO: 22.51 K/UL — HIGH (ref 3.8–10.5)

## 2019-12-09 PROCEDURE — 72040 X-RAY EXAM NECK SPINE 2-3 VW: CPT | Mod: 26

## 2019-12-09 RX ORDER — CALCIUM GLUCONATE 100 MG/ML
1 VIAL (ML) INTRAVENOUS ONCE
Refills: 0 | Status: COMPLETED | OUTPATIENT
Start: 2019-12-09 | End: 2019-12-09

## 2019-12-09 RX ORDER — ENOXAPARIN SODIUM 100 MG/ML
40 INJECTION SUBCUTANEOUS AT BEDTIME
Refills: 0 | Status: DISCONTINUED | OUTPATIENT
Start: 2019-12-09 | End: 2019-12-12

## 2019-12-09 RX ORDER — ACETAMINOPHEN 500 MG
1000 TABLET ORAL ONCE
Refills: 0 | Status: COMPLETED | OUTPATIENT
Start: 2019-12-09 | End: 2019-12-09

## 2019-12-09 RX ORDER — DEXAMETHASONE 0.5 MG/5ML
6 ELIXIR ORAL ONCE
Refills: 0 | Status: COMPLETED | OUTPATIENT
Start: 2019-12-09 | End: 2019-12-09

## 2019-12-09 RX ORDER — DIAZEPAM 5 MG
2 TABLET ORAL EVERY 6 HOURS
Refills: 0 | Status: DISCONTINUED | OUTPATIENT
Start: 2019-12-09 | End: 2019-12-12

## 2019-12-09 RX ADMIN — Medication 4 MILLIGRAM(S): at 03:13

## 2019-12-09 RX ADMIN — FAMOTIDINE 20 MILLIGRAM(S): 10 INJECTION INTRAVENOUS at 05:19

## 2019-12-09 RX ADMIN — Medication 4 MILLIGRAM(S): at 14:38

## 2019-12-09 RX ADMIN — SODIUM CHLORIDE 75 MILLILITER(S): 9 INJECTION INTRAMUSCULAR; INTRAVENOUS; SUBCUTANEOUS at 05:19

## 2019-12-09 RX ADMIN — Medication 1000 MILLIGRAM(S): at 18:10

## 2019-12-09 RX ADMIN — Medication 400 MILLIGRAM(S): at 17:34

## 2019-12-09 RX ADMIN — TRAMADOL HYDROCHLORIDE 50 MILLIGRAM(S): 50 TABLET ORAL at 01:10

## 2019-12-09 RX ADMIN — Medication 100 MILLIGRAM(S): at 10:27

## 2019-12-09 RX ADMIN — Medication 6 MILLIGRAM(S): at 06:52

## 2019-12-09 RX ADMIN — SODIUM CHLORIDE 75 MILLILITER(S): 9 INJECTION INTRAMUSCULAR; INTRAVENOUS; SUBCUTANEOUS at 10:27

## 2019-12-09 RX ADMIN — Medication 4 MILLIGRAM(S): at 21:36

## 2019-12-09 RX ADMIN — SODIUM CHLORIDE 3 MILLILITER(S): 9 INJECTION INTRAMUSCULAR; INTRAVENOUS; SUBCUTANEOUS at 06:00

## 2019-12-09 RX ADMIN — TRAMADOL HYDROCHLORIDE 50 MILLIGRAM(S): 50 TABLET ORAL at 11:15

## 2019-12-09 RX ADMIN — TRAMADOL HYDROCHLORIDE 50 MILLIGRAM(S): 50 TABLET ORAL at 01:40

## 2019-12-09 RX ADMIN — TRAMADOL HYDROCHLORIDE 50 MILLIGRAM(S): 50 TABLET ORAL at 14:38

## 2019-12-09 RX ADMIN — TRAMADOL HYDROCHLORIDE 50 MILLIGRAM(S): 50 TABLET ORAL at 10:29

## 2019-12-09 RX ADMIN — Medication 100 GRAM(S): at 12:52

## 2019-12-09 RX ADMIN — Medication 100 MILLIGRAM(S): at 01:11

## 2019-12-09 RX ADMIN — SODIUM CHLORIDE 3 MILLILITER(S): 9 INJECTION INTRAMUSCULAR; INTRAVENOUS; SUBCUTANEOUS at 12:52

## 2019-12-09 RX ADMIN — TRAMADOL HYDROCHLORIDE 50 MILLIGRAM(S): 50 TABLET ORAL at 15:15

## 2019-12-09 RX ADMIN — ENOXAPARIN SODIUM 40 MILLIGRAM(S): 100 INJECTION SUBCUTANEOUS at 21:37

## 2019-12-09 RX ADMIN — Medication 2 MILLIGRAM(S): at 08:40

## 2019-12-09 RX ADMIN — Medication 2 MILLIGRAM(S): at 00:19

## 2019-12-09 RX ADMIN — POLYETHYLENE GLYCOL 3350 17 GRAM(S): 17 POWDER, FOR SOLUTION ORAL at 12:52

## 2019-12-09 RX ADMIN — SODIUM CHLORIDE 3 MILLILITER(S): 9 INJECTION INTRAMUSCULAR; INTRAVENOUS; SUBCUTANEOUS at 21:37

## 2019-12-09 RX ADMIN — Medication 100 MILLIGRAM(S): at 17:24

## 2019-12-09 RX ADMIN — Medication 100 MILLIGRAM(S): at 21:36

## 2019-12-09 RX ADMIN — Medication 4 MILLIGRAM(S): at 08:50

## 2019-12-09 RX ADMIN — FAMOTIDINE 20 MILLIGRAM(S): 10 INJECTION INTRAVENOUS at 17:24

## 2019-12-09 NOTE — PROGRESS NOTE ADULT - SUBJECTIVE AND OBJECTIVE BOX
Patient is a 49y old  Female who presents with a chief complaint of severe cervical stenosis (09 Dec 2019 09:13)      SUBJECTIVE / OVERNIGHT EVENTS: No new complaints.   Review of Systems  chest pain no  palpitations no  sob no  nausea no  headache no    MEDICATIONS  (STANDING):  ALPRAZolam 0.25 milliGRAM(s) Oral once  dexAMETHasone  Injectable 4 milliGRAM(s) IV Push every 6 hours  dextrose 5%. 1000 milliLiter(s) (50 mL/Hr) IV Continuous <Continuous>  dextrose 50% Injectable 12.5 Gram(s) IV Push once  dextrose 50% Injectable 25 Gram(s) IV Push once  dextrose 50% Injectable 25 Gram(s) IV Push once  enoxaparin Injectable 40 milliGRAM(s) SubCutaneous at bedtime  famotidine    Tablet 20 milliGRAM(s) Oral two times a day  insulin lispro (HumaLOG) corrective regimen sliding scale   SubCutaneous three times a day before meals  insulin lispro (HumaLOG) corrective regimen sliding scale   SubCutaneous at bedtime  polyethylene glycol 3350 17 Gram(s) Oral daily  sodium chloride 0.9% lock flush 3 milliLiter(s) IV Push every 8 hours  traZODone 100 milliGRAM(s) Oral at bedtime    MEDICATIONS  (PRN):  acetaminophen   Tablet .. 650 milliGRAM(s) Oral every 6 hours PRN Temp greater or equal to 38C (100.4F), Mild Pain (1 - 3)  cyclobenzaprine 10 milliGRAM(s) Oral three times a day PRN Muscle Spasm  dextrose 40% Gel 15 Gram(s) Oral once PRN Blood Glucose LESS THAN 70 milliGRAM(s)/deciliter  diazepam    Tablet 2 milliGRAM(s) Oral every 6 hours PRN anxiety  glucagon  Injectable 1 milliGRAM(s) IntraMuscular once PRN Glucose LESS THAN 70 milligrams/deciliter  melatonin 3 milliGRAM(s) Oral at bedtime PRN Insomnia  ondansetron Injectable 4 milliGRAM(s) IV Push every 6 hours PRN Nausea and/or Vomiting  senna 2 Tablet(s) Oral at bedtime PRN Constipation  traMADol 25 milliGRAM(s) Oral every 4 hours PRN Moderate Pain (4 - 6)  traMADol 50 milliGRAM(s) Oral every 4 hours PRN Severe Pain (7 - 10)      Vital Signs Last 24 Hrs  T(C): 37.1 (09 Dec 2019 20:09), Max: 37.1 (09 Dec 2019 20:09)  T(F): 98.7 (09 Dec 2019 20:09), Max: 98.7 (09 Dec 2019 20:09)  HR: 81 (09 Dec 2019 20:09) (59 - 89)  BP: 146/84 (09 Dec 2019 20:09) (115/60 - 153/91)  BP(mean): 82 (09 Dec 2019 00:00) (82 - 92)  RR: 18 (09 Dec 2019 20:09) (15 - 18)  SpO2: 97% (09 Dec 2019 20:09) (94% - 100%)    PHYSICAL EXAM:  GENERAL: NAD, well-developed  HEAD:  Atraumatic, Normocephalic  EYES: EOMI, PERRLA, conjunctiva and sclera clear  NECK: Supple, No JVD  CHEST/LUNG: Clear to auscultation bilaterally; No wheeze  HEART: Regular rate and rhythm; No murmurs, rubs, or gallops  ABDOMEN: Soft, Nontender, Nondistended; Bowel sounds present  EXTREMITIES:  2+ Peripheral Pulses, No clubbing, cyanosis, or edema  PSYCH: AAOx3  NEUROLOGY: non-focal  SKIN: No rashes or lesions    LABS:                        12.4   22.51 )-----------( 348      ( 09 Dec 2019 07:43 )             38.6     12-09    136  |  103  |  9   ----------------------------<  141<H>  4.5   |  24  |  0.53    Ca    8.2<L>      09 Dec 2019 06:34  Phos  2.6     12-09  Mg     2.2     12-09                  RADIOLOGY & ADDITIONAL TESTS:    Imaging Personally Reviewed:    Consultant(s) Notes Reviewed:      Care Discussed with Consultants/Other Providers:

## 2019-12-09 NOTE — PHYSICAL THERAPY INITIAL EVALUATION ADULT - ADDITIONAL COMMENTS
Pt lives in a condo apartment with her boyfriend +elevator access, does not have to do steps. Was ambulating independently without use of an AD and was independent with all ADLS PTA

## 2019-12-09 NOTE — PHYSICAL THERAPY INITIAL EVALUATION ADULT - ACTIVE RANGE OF MOTION EXAMINATION, REHAB EVAL
bilateral  lower extremity Active ROM was WFL (within functional limits)/Left LE Active ROM was WFL (within functional limits)/Left UE Active ROM was WFL (within functional limits)/Right UE Active ROM was WFL (within functional limits)

## 2019-12-09 NOTE — PROGRESS NOTE ADULT - SUBJECTIVE AND OBJECTIVE BOX
SUBJECTIVE: Pt seen and examined, reports sore throat, tolerating soft diet, reports right arm shooting pain has resolved. She reports feeling anxious.    OVERNIGHT EVENTS: none    Vital Signs Last 24 Hrs  T(C): 36.7 (09 Dec 2019 07:59), Max: 37.1 (08 Dec 2019 11:34)  T(F): 98.1 (09 Dec 2019 07:59), Max: 98.7 (08 Dec 2019 11:34)  HR: 88 (09 Dec 2019 07:59) (59 - 91)  BP: 120/70 (09 Dec 2019 07:59) (111/72 - 144/85)  BP(mean): 82 (09 Dec 2019 00:00) (82 - 97)  RR: 18 (09 Dec 2019 07:59) (15 - 18)  SpO2: 99% (09 Dec 2019 07:59) (94% - 100%)    PHYSICAL EXAM:    General: No Acute Distress     Neurological: Awake, alert oriented to person, place and time, Following Commands,  Speech Fluent, Moving all extremities, Muscle Strength normal in all four extremities, No Drift, Sensation to Light Touch Intact    Pulmonary: Clear to Auscultation, No Rales, No Rhonchi, No Wheezes     Cardiovascular: S1, S2, Regular Rate and Rhythm     Gastrointestinal: Soft, Nontender, Nondistended     Incision: ant neck dressing c/d/i    LABS:                        12.4   22.51 )-----------( 348      ( 09 Dec 2019 07:43 )             38.6    12-09    136  |  103  |  9   ----------------------------<  141<H>  4.5   |  24  |  0.53    Ca    8.2<L>      09 Dec 2019 06:34  Phos  2.6     12-09  Mg     2.2     12-09      Hemoglobin A1C, Whole Blood: 5.6 % (12-07 @ 09:58)      12-08 @ 07:01  -  12-09 @ 07:00  --------------------------------------------------------  IN: 745 mL / OUT: 300 mL / NET: 445 mL      DRAINS: HMV (0)    MEDICATIONS:  Antibiotics:  ceFAZolin   IVPB 2000 milliGRAM(s) IV Intermittent every 8 hours    Neuro:  acetaminophen   Tablet .. 650 milliGRAM(s) Oral every 6 hours PRN Temp greater or equal to 38C (100.4F), Mild Pain (1 - 3)  ALPRAZolam 0.25 milliGRAM(s) Oral once  cyclobenzaprine 10 milliGRAM(s) Oral three times a day PRN Muscle Spasm  diazepam    Tablet 2 milliGRAM(s) Oral every 6 hours PRN anxiety  melatonin 3 milliGRAM(s) Oral at bedtime PRN Insomnia  ondansetron Injectable 4 milliGRAM(s) IV Push every 6 hours PRN Nausea and/or Vomiting  traMADol 25 milliGRAM(s) Oral every 4 hours PRN Moderate Pain (4 - 6)  traMADol 50 milliGRAM(s) Oral every 4 hours PRN Severe Pain (7 - 10)  traZODone 100 milliGRAM(s) Oral at bedtime    Cardiac:    Pulm:    GI/:  famotidine    Tablet 20 milliGRAM(s) Oral two times a day  polyethylene glycol 3350 17 Gram(s) Oral daily  senna 2 Tablet(s) Oral at bedtime PRN Constipation    Other:   calcium gluconate IVPB 1 Gram(s) IV Intermittent once  dexAMETHasone  Injectable 4 milliGRAM(s) IV Push every 6 hours  dextrose 40% Gel 15 Gram(s) Oral once PRN Blood Glucose LESS THAN 70 milliGRAM(s)/deciliter  dextrose 5%. 1000 milliLiter(s) IV Continuous <Continuous>  dextrose 50% Injectable 12.5 Gram(s) IV Push once  dextrose 50% Injectable 25 Gram(s) IV Push once  dextrose 50% Injectable 25 Gram(s) IV Push once  glucagon  Injectable 1 milliGRAM(s) IntraMuscular once PRN Glucose LESS THAN 70 milligrams/deciliter  insulin lispro (HumaLOG) corrective regimen sliding scale   SubCutaneous three times a day before meals  insulin lispro (HumaLOG) corrective regimen sliding scale   SubCutaneous at bedtime  sodium chloride 0.9% lock flush 3 milliLiter(s) IV Push every 8 hours  sodium chloride 0.9%. 1000 milliLiter(s) IV Continuous <Continuous>    DIET: [x] Regular [] CCD [] Renal [] Puree [] Dysphagia [] Tube Feeds:     IMAGING:   < from: CT Cervical Spine No Cont (12.06.19 @ 17:08) >    IMPRESSION:     Degenerative changes involve the cervical spine as described, most   pronounced at the C4-C5 level. Consider follow up cervical spine MRI for   further workup if clinically warranted.    < end of copied text >

## 2019-12-09 NOTE — PHYSICAL THERAPY INITIAL EVALUATION ADULT - PRECAUTIONS/LIMITATIONS, REHAB EVAL
spinal precautions/fall precautions/CT C-spine 12/6: Degenerative changes involve the cervical spine as described, most pronounced at the C4-C5 level. Consider follow up cervical spine MRI for further workup if clinically warranted.

## 2019-12-09 NOTE — PHYSICAL THERAPY INITIAL EVALUATION ADULT - PERTINENT HX OF CURRENT PROBLEM, REHAB EVAL
49F no major pmhx with neck pain with radiculopathy found to have severe cervical stenosis worst at C4-6 with cord signal change. Pt now s/p C4-5 ACDF on 12/8.

## 2019-12-09 NOTE — PROGRESS NOTE ADULT - ASSESSMENT
HTN  cont current meds     Depression   on meds    DVT proph  lovenox post op once deemed safe    Gi proph  on Pepcid

## 2019-12-09 NOTE — PROGRESS NOTE ADULT - ASSESSMENT
49F no major pmhx with neck pain with radiculopathy found to have severe cervical stenosis worst at C4-6 with cord signal change.     PROCEDURE:  12/8 s/p C4-5 ACDF  POD#1    PLAN:  Neuro:   - maintain HMV- monitor drainage  - continue decadron for post op edema, given extra dose this morning   - anxiety- valium 2mg q6 prn  - pain control- tramadol prn, flexeril prn spasm  Respiratory:   - encouraged incentive spirometry  CV:    Endocrine:   Heme/Onc:             DVT ppx:   Renal:   ID:   GI:    PT/OT:   Will discuss with ____    Assessment:  Please Check When Present   []  GCS  E   V  M     Heart Failure: []Acute, [] acute on chronic , []chronic  Heart Failure:  [] Diastolic (HFpEF), [] Systolic (HFrEF), []Combined (HFpEF and HFrEF), [] RHF, [] Pulm HTN, [] Other    [] SONIA, [] ATN, [] AIN, [] other  [] CKD1, [] CKD2, [] CKD 3, [] CKD 4, [] CKD 5, []ESRD    Encephalopathy: [] Metabolic, [] Hepatic, [] toxic, [] Neurological, [] Other    Abnormal Nurtitional Status: [] malnurtition (see nutrition note), [ ]underweight: BMI < 19, [] morbid obesity: BMI >40, [] Cachexia    [] Sepsis  [] hypovolemic shock,[] cardiogenic shock, [] hemorrhagic shock, [] neuogenic shock  [] Acute Respiratory Failure  []Cerebral edema, [] Brain compression/ herniation,   [] Functional quadriplegia  [] Acute blood loss anemia    [employer] # [id number] 49F no major pmhx with neck pain with radiculopathy found to have severe cervical stenosis worst at C4-6 with cord signal change.     PROCEDURE:  12/8 s/p C4-5 ACDF  POD#1    PLAN:  Neuro:   - maintain HMV- monitor drainage  - continue decadron for post op edema, given extra dose this morning   - anxiety- valium 2mg q6 prn  - pain control- tramadol prn, flexeril prn spasm  Respiratory:   - encouraged incentive spirometry  CV:  - vitals stable  Endocrine:   - check fingersticks while on decadron  DVT ppx:   -venodynes, sq lovenox   PT/OT:   TBD    Assessment:  Please Check When Present   []  GCS  E   V  M     Heart Failure: []Acute, [] acute on chronic , []chronic  Heart Failure:  [] Diastolic (HFpEF), [] Systolic (HFrEF), []Combined (HFpEF and HFrEF), [] RHF, [] Pulm HTN, [] Other    [] SONIA, [] ATN, [] AIN, [] other  [] CKD1, [] CKD2, [] CKD 3, [] CKD 4, [] CKD 5, []ESRD    Encephalopathy: [] Metabolic, [] Hepatic, [] toxic, [] Neurological, [] Other    Abnormal Nurtitional Status: [] malnurtition (see nutrition note), [ ]underweight: BMI < 19, [] morbid obesity: BMI >40, [] Cachexia    [] Sepsis  [] hypovolemic shock,[] cardiogenic shock, [] hemorrhagic shock, [] neuogenic shock  [] Acute Respiratory Failure  []Cerebral edema, [] Brain compression/ herniation,   [] Functional quadriplegia  [] Acute blood loss anemia    [employer] # [id number]

## 2019-12-09 NOTE — PROGRESS NOTE ADULT - ASSESSMENT
49 f with     Cervical stenosis  - pain control  - steroids  - postop care    BS control while on steroids    HTN control    Depression  - continue Rx    Gastritis  - PPI    d/w patient     Haresh Dubois MD pager [phone]

## 2019-12-10 LAB
GLUCOSE BLDC GLUCOMTR-MCNC: 115 MG/DL — HIGH (ref 70–99)
GLUCOSE BLDC GLUCOMTR-MCNC: 115 MG/DL — HIGH (ref 70–99)
GLUCOSE BLDC GLUCOMTR-MCNC: 125 MG/DL — HIGH (ref 70–99)
GLUCOSE BLDC GLUCOMTR-MCNC: 133 MG/DL — HIGH (ref 70–99)

## 2019-12-10 RX ORDER — DEXAMETHASONE 0.5 MG/5ML
2 ELIXIR ORAL EVERY 12 HOURS
Refills: 0 | Status: DISCONTINUED | OUTPATIENT
Start: 2019-12-11 | End: 2019-12-12

## 2019-12-10 RX ORDER — SENNA PLUS 8.6 MG/1
2 TABLET ORAL AT BEDTIME
Refills: 0 | Status: DISCONTINUED | OUTPATIENT
Start: 2019-12-10 | End: 2019-12-12

## 2019-12-10 RX ORDER — DEXAMETHASONE 0.5 MG/5ML
ELIXIR ORAL
Refills: 0 | Status: DISCONTINUED | OUTPATIENT
Start: 2019-12-10 | End: 2019-12-12

## 2019-12-10 RX ORDER — DEXAMETHASONE 0.5 MG/5ML
2 ELIXIR ORAL EVERY 8 HOURS
Refills: 0 | Status: COMPLETED | OUTPATIENT
Start: 2019-12-10 | End: 2019-12-11

## 2019-12-10 RX ADMIN — FAMOTIDINE 20 MILLIGRAM(S): 10 INJECTION INTRAVENOUS at 05:28

## 2019-12-10 RX ADMIN — TRAMADOL HYDROCHLORIDE 50 MILLIGRAM(S): 50 TABLET ORAL at 01:00

## 2019-12-10 RX ADMIN — SODIUM CHLORIDE 3 MILLILITER(S): 9 INJECTION INTRAMUSCULAR; INTRAVENOUS; SUBCUTANEOUS at 05:25

## 2019-12-10 RX ADMIN — Medication 650 MILLIGRAM(S): at 16:20

## 2019-12-10 RX ADMIN — ENOXAPARIN SODIUM 40 MILLIGRAM(S): 100 INJECTION SUBCUTANEOUS at 22:09

## 2019-12-10 RX ADMIN — Medication 4 MILLIGRAM(S): at 09:08

## 2019-12-10 RX ADMIN — SODIUM CHLORIDE 3 MILLILITER(S): 9 INJECTION INTRAMUSCULAR; INTRAVENOUS; SUBCUTANEOUS at 22:00

## 2019-12-10 RX ADMIN — Medication 650 MILLIGRAM(S): at 15:29

## 2019-12-10 RX ADMIN — Medication 2 MILLIGRAM(S): at 22:08

## 2019-12-10 RX ADMIN — Medication 100 MILLIGRAM(S): at 22:08

## 2019-12-10 RX ADMIN — Medication 4 MILLIGRAM(S): at 03:10

## 2019-12-10 RX ADMIN — Medication 2 MILLIGRAM(S): at 01:00

## 2019-12-10 RX ADMIN — SENNA PLUS 2 TABLET(S): 8.6 TABLET ORAL at 22:08

## 2019-12-10 RX ADMIN — SODIUM CHLORIDE 3 MILLILITER(S): 9 INJECTION INTRAMUSCULAR; INTRAVENOUS; SUBCUTANEOUS at 13:18

## 2019-12-10 RX ADMIN — Medication 2 MILLIGRAM(S): at 15:29

## 2019-12-10 RX ADMIN — Medication 2 MILLIGRAM(S): at 12:05

## 2019-12-10 RX ADMIN — POLYETHYLENE GLYCOL 3350 17 GRAM(S): 17 POWDER, FOR SOLUTION ORAL at 12:05

## 2019-12-10 RX ADMIN — FAMOTIDINE 20 MILLIGRAM(S): 10 INJECTION INTRAVENOUS at 17:27

## 2019-12-10 NOTE — PROGRESS NOTE ADULT - SUBJECTIVE AND OBJECTIVE BOX
Patient is a 49y old  Female who presents with a chief complaint of severe cervical stenosis (10 Dec 2019 14:44)      SUBJECTIVE / OVERNIGHT EVENTS: No new complaints. Anxious.  Review of Systems  chest pain no  palpitations no  sob no  nausea no  headache no    MEDICATIONS  (STANDING):  ALPRAZolam 0.25 milliGRAM(s) Oral once  dexAMETHasone     Tablet   Oral   dexAMETHasone     Tablet 2 milliGRAM(s) Oral every 8 hours  dextrose 5%. 1000 milliLiter(s) (50 mL/Hr) IV Continuous <Continuous>  dextrose 50% Injectable 12.5 Gram(s) IV Push once  dextrose 50% Injectable 25 Gram(s) IV Push once  dextrose 50% Injectable 25 Gram(s) IV Push once  enoxaparin Injectable 40 milliGRAM(s) SubCutaneous at bedtime  famotidine    Tablet 20 milliGRAM(s) Oral two times a day  insulin lispro (HumaLOG) corrective regimen sliding scale   SubCutaneous three times a day before meals  insulin lispro (HumaLOG) corrective regimen sliding scale   SubCutaneous at bedtime  polyethylene glycol 3350 17 Gram(s) Oral daily  senna 2 Tablet(s) Oral at bedtime  sodium chloride 0.9% lock flush 3 milliLiter(s) IV Push every 8 hours  traZODone 100 milliGRAM(s) Oral at bedtime    MEDICATIONS  (PRN):  acetaminophen   Tablet .. 650 milliGRAM(s) Oral every 6 hours PRN Temp greater or equal to 38C (100.4F), Mild Pain (1 - 3)  bisacodyl Suppository 10 milliGRAM(s) Rectal daily PRN Constipation  cyclobenzaprine 10 milliGRAM(s) Oral three times a day PRN Muscle Spasm  dextrose 40% Gel 15 Gram(s) Oral once PRN Blood Glucose LESS THAN 70 milliGRAM(s)/deciliter  diazepam    Tablet 2 milliGRAM(s) Oral every 6 hours PRN anxiety  glucagon  Injectable 1 milliGRAM(s) IntraMuscular once PRN Glucose LESS THAN 70 milligrams/deciliter  melatonin 3 milliGRAM(s) Oral at bedtime PRN Insomnia  ondansetron Injectable 4 milliGRAM(s) IV Push every 6 hours PRN Nausea and/or Vomiting  traMADol 25 milliGRAM(s) Oral every 4 hours PRN Moderate Pain (4 - 6)  traMADol 50 milliGRAM(s) Oral every 4 hours PRN Severe Pain (7 - 10)      Vital Signs Last 24 Hrs  T(C): 36.7 (10 Dec 2019 15:46), Max: 37.1 (09 Dec 2019 20:09)  T(F): 98.1 (10 Dec 2019 15:46), Max: 98.7 (09 Dec 2019 20:09)  HR: 78 (10 Dec 2019 15:46) (68 - 82)  BP: 128/82 (10 Dec 2019 15:46) (128/82 - 146/84)  BP(mean): --  RR: 18 (10 Dec 2019 15:46) (18 - 18)  SpO2: 95% (10 Dec 2019 15:46) (94% - 99%)    PHYSICAL EXAM:  GENERAL: NAD   HEAD:  Atraumatic, Normocephalic  EYES: EOMI, PERRLA, conjunctiva and sclera clear  NECK: Supple, No JVD Incision with clean dressing.  CHEST/LUNG: Clear to auscultation bilaterally; No wheeze  HEART: Regular rate and rhythm; No murmurs, rubs, or gallops  ABDOMEN: Soft, Nontender, Nondistended; Bowel sounds present  EXTREMITIES:  2+ Peripheral Pulses, No clubbing, cyanosis, or edema  PSYCH: Anxious  NEUROLOGY: non-focal  SKIN: No rashes or lesions    LABS:                        12.4   22.51 )-----------( 348      ( 09 Dec 2019 07:43 )             38.6     12-09    136  |  103  |  9   ----------------------------<  141<H>  4.5   |  24  |  0.53    Ca    8.2<L>      09 Dec 2019 06:34  Phos  2.6     12-09  Mg     2.2     12-09                  RADIOLOGY & ADDITIONAL TESTS:    Imaging Personally Reviewed:    Consultant(s) Notes Reviewed:      Care Discussed with Consultants/Other Providers:

## 2019-12-10 NOTE — PROGRESS NOTE ADULT - ASSESSMENT
49F no major pmhx with neck pain with radiculopathy found to have severe cervical stenosis worst at C4-6 with cord signal change.     PROCEDURE:  12/8 s/p C4-5 ACDF    PLAN:  Neuro:   - drain removed, monitor incision, decadron taper for post op edema  - anxiety- valium 2mg q6 prn- working well for pain/spams/anxiety  - pain control- tramadol prn, flexeril prn spasm  Respiratory:   - encouraged incentive spirometry  CV:  - vitals stable  Endocrine:   - check fingersticks while on decadron  DVT ppx:   -venodynes, sq lovenox   PT/OT: outpatient PT upon d/c; later tomorrow vs am    Will discuss with Dr Pricila Hull # [id number]    Assessment:  Please Check When Present   []  GCS  E   V  M     Heart Failure: []Acute, [] acute on chronic , []chronic  Heart Failure:  [] Diastolic (HFpEF), [] Systolic (HFrEF), []Combined (HFpEF and HFrEF), [] RHF, [] Pulm HTN, [] Other    [] SONIA, [] ATN, [] AIN, [] other  [] CKD1, [] CKD2, [] CKD 3, [] CKD 4, [] CKD 5, []ESRD    Encephalopathy: [] Metabolic, [] Hepatic, [] toxic, [] Neurological, [] Other    Abnormal Nurtitional Status: [] malnurtition (see nutrition note), [ ]underweight: BMI < 19, [] morbid obesity: BMI >40, [] Cachexia    [] Sepsis  [] hypovolemic shock,[] cardiogenic shock, [] hemorrhagic shock, [] neuogenic shock  [] Acute Respiratory Failure  []Cerebral edema, [] Brain compression/ herniation,   [] Functional quadriplegia  [] Acute blood loss anemia

## 2019-12-10 NOTE — PROGRESS NOTE ADULT - SUBJECTIVE AND OBJECTIVE BOX
SUBJECTIVE: Pt seen and examined, reports improved sore throat and feeling very anxious. tolerating PO diet easier today.     Vital Signs Last 24 Hrs  T(C): 36.9 (10 Dec 2019 12:47), Max: 37.1 (09 Dec 2019 20:09)  T(F): 98.4 (10 Dec 2019 12:47), Max: 98.7 (09 Dec 2019 20:09)  HR: 74 (10 Dec 2019 12:47) (68 - 82)  BP: 142/84 (10 Dec 2019 12:47) (135/82 - 153/91)  BP(mean): --  RR: 18 (10 Dec 2019 12:47) (18 - 18)  SpO2: 99% (10 Dec 2019 12:47) (94% - 99%)    HMVC drain 50cc/24h    PHYSICAL EXAM:    General: No Acute Distress     Neurological: Awake, alert oriented to person, place and time, Following Commands,  Speech Fluent, Moving all extremities, Muscle Strength normal in all four extremities, No Drift, Sensation to Light Touch Intact, voice normal    Pulmonary: Clear to Auscultation, No Rales, No Rhonchi, No Wheezes     Cardiovascular: S1, S2, Regular Rate and Rhythm     Gastrointestinal: Soft, Nontender, Nondistended     Incision: ant neck +steri strips c/d/i, flat no evidence of hematoma, drain removed without difficulty,     LABS:                    12.4   22.51 )-----------( 348      ( 09 Dec 2019 07:43 )             38.6   12-09    136  |  103  |  9   ----------------------------<  141<H>  4.5   |  24  |  0.53    Ca    8.2<L>      09 Dec 2019 06:34  Phos  2.6     12-09  Mg     2.2     12-09    Hemoglobin A1C, Whole Blood: 5.6 % (12-07 @ 09:58)    MEDICATIONS  (STANDING):  ALPRAZolam 0.25 milliGRAM(s) Oral once  dexAMETHasone     Tablet   Oral   dexAMETHasone     Tablet 2 milliGRAM(s) Oral every 8 hours  dextrose 5%. 1000 milliLiter(s) (50 mL/Hr) IV Continuous <Continuous>  dextrose 50% Injectable 12.5 Gram(s) IV Push once  dextrose 50% Injectable 25 Gram(s) IV Push once  dextrose 50% Injectable 25 Gram(s) IV Push once  enoxaparin Injectable 40 milliGRAM(s) SubCutaneous at bedtime  famotidine    Tablet 20 milliGRAM(s) Oral two times a day  insulin lispro (HumaLOG) corrective regimen sliding scale   SubCutaneous three times a day before meals  insulin lispro (HumaLOG) corrective regimen sliding scale   SubCutaneous at bedtime  polyethylene glycol 3350 17 Gram(s) Oral daily  senna 2 Tablet(s) Oral at bedtime  sodium chloride 0.9% lock flush 3 milliLiter(s) IV Push every 8 hours  traZODone 100 milliGRAM(s) Oral at bedtime    MEDICATIONS  (PRN):  acetaminophen   Tablet .. 650 milliGRAM(s) Oral every 6 hours PRN Temp greater or equal to 38C (100.4F), Mild Pain (1 - 3)  bisacodyl Suppository 10 milliGRAM(s) Rectal daily PRN Constipation  cyclobenzaprine 10 milliGRAM(s) Oral three times a day PRN Muscle Spasm  dextrose 40% Gel 15 Gram(s) Oral once PRN Blood Glucose LESS THAN 70 milliGRAM(s)/deciliter  diazepam    Tablet 2 milliGRAM(s) Oral every 6 hours PRN anxiety  glucagon  Injectable 1 milliGRAM(s) IntraMuscular once PRN Glucose LESS THAN 70 milligrams/deciliter  melatonin 3 milliGRAM(s) Oral at bedtime PRN Insomnia  ondansetron Injectable 4 milliGRAM(s) IV Push every 6 hours PRN Nausea and/or Vomiting  traMADol 25 milliGRAM(s) Oral every 4 hours PRN Moderate Pain (4 - 6)  traMADol 50 milliGRAM(s) Oral every 4 hours PRN Severe Pain (7 - 10)      DIET: [x] Regular [] CCD [] Renal [] Puree [] Dysphagia [] Tube Feeds:     IMAGING:   < from: CT Cervical Spine No Cont (12.06.19 @ 17:08) >    IMPRESSION:     Degenerative changes involve the cervical spine as described, most   pronounced at the C4-C5 level. Consider follow up cervical spine MRI for   further workup if clinically warranted.    < end of copied text >

## 2019-12-10 NOTE — PROGRESS NOTE ADULT - SUBJECTIVE AND OBJECTIVE BOX
Subjective: Patient seen and examined. No new events except as noted.     SUBJECTIVE/ROS:  feels ok       MEDICATIONS:  MEDICATIONS  (STANDING):  ALPRAZolam 0.25 milliGRAM(s) Oral once  dexAMETHasone     Tablet   Oral   dexAMETHasone     Tablet 2 milliGRAM(s) Oral every 8 hours  dextrose 5%. 1000 milliLiter(s) (50 mL/Hr) IV Continuous <Continuous>  dextrose 50% Injectable 12.5 Gram(s) IV Push once  dextrose 50% Injectable 25 Gram(s) IV Push once  dextrose 50% Injectable 25 Gram(s) IV Push once  enoxaparin Injectable 40 milliGRAM(s) SubCutaneous at bedtime  famotidine    Tablet 20 milliGRAM(s) Oral two times a day  insulin lispro (HumaLOG) corrective regimen sliding scale   SubCutaneous three times a day before meals  insulin lispro (HumaLOG) corrective regimen sliding scale   SubCutaneous at bedtime  polyethylene glycol 3350 17 Gram(s) Oral daily  senna 2 Tablet(s) Oral at bedtime  sodium chloride 0.9% lock flush 3 milliLiter(s) IV Push every 8 hours  traZODone 100 milliGRAM(s) Oral at bedtime      PHYSICAL EXAM:  T(C): 36.9 (12-10-19 @ 12:47), Max: 37.1 (12-09-19 @ 20:09)  HR: 74 (12-10-19 @ 12:47) (68 - 82)  BP: 142/84 (12-10-19 @ 12:47) (135/82 - 153/91)  RR: 18 (12-10-19 @ 12:47) (18 - 18)  SpO2: 99% (12-10-19 @ 12:47) (94% - 99%)  Wt(kg): --  I&O's Summary    09 Dec 2019 07:01  -  10 Dec 2019 07:00  --------------------------------------------------------  IN: 720 mL / OUT: 50 mL / NET: 670 mL            JVP: Normal  Neck: supple  Lung: clear   CV: S1 S2 , Murmur:  Abd: soft  Ext: No edema  neuro: Awake / alert  Psych: flat affect  Skin: normal``    LABS/DATA:    CARDIAC MARKERS:                                12.4   22.51 )-----------( 348      ( 09 Dec 2019 07:43 )             38.6     12-09    136  |  103  |  9   ----------------------------<  141<H>  4.5   |  24  |  0.53    Ca    8.2<L>      09 Dec 2019 06:34  Phos  2.6     12-09  Mg     2.2     12-09      proBNP:   Lipid Profile:   HgA1c:   TSH:     TELE:  EKG:

## 2019-12-10 NOTE — PROGRESS NOTE ADULT - ASSESSMENT
49 f with     Cervical stenosis  - pain control  - steroids taper  - postop care    BS control while on steroids    HTN control    Depression and Anxiety  - continue Rx    Gastritis  - PPI    d/w patient     Haresh Dubois MD pager [phone]

## 2019-12-11 ENCOUNTER — TRANSCRIPTION ENCOUNTER (OUTPATIENT)
Age: 49
End: 2019-12-11

## 2019-12-11 DIAGNOSIS — F60.3 BORDERLINE PERSONALITY DISORDER: ICD-10-CM

## 2019-12-11 DIAGNOSIS — F43.23 ADJUSTMENT DISORDER WITH MIXED ANXIETY AND DEPRESSED MOOD: ICD-10-CM

## 2019-12-11 LAB
GLUCOSE BLDC GLUCOMTR-MCNC: 108 MG/DL — HIGH (ref 70–99)
GLUCOSE BLDC GLUCOMTR-MCNC: 98 MG/DL — SIGNIFICANT CHANGE UP (ref 70–99)

## 2019-12-11 PROCEDURE — 99223 1ST HOSP IP/OBS HIGH 75: CPT | Mod: GC

## 2019-12-11 RX ORDER — TRAZODONE HCL 50 MG
150 TABLET ORAL AT BEDTIME
Refills: 0 | Status: DISCONTINUED | OUTPATIENT
Start: 2019-12-11 | End: 2019-12-12

## 2019-12-11 RX ORDER — QUETIAPINE FUMARATE 200 MG/1
25 TABLET, FILM COATED ORAL EVERY 6 HOURS
Refills: 0 | Status: DISCONTINUED | OUTPATIENT
Start: 2019-12-11 | End: 2019-12-12

## 2019-12-11 RX ADMIN — Medication 10 MILLIGRAM(S): at 09:55

## 2019-12-11 RX ADMIN — Medication 150 MILLIGRAM(S): at 21:38

## 2019-12-11 RX ADMIN — SODIUM CHLORIDE 3 MILLILITER(S): 9 INJECTION INTRAMUSCULAR; INTRAVENOUS; SUBCUTANEOUS at 21:37

## 2019-12-11 RX ADMIN — FAMOTIDINE 20 MILLIGRAM(S): 10 INJECTION INTRAVENOUS at 06:07

## 2019-12-11 RX ADMIN — TRAMADOL HYDROCHLORIDE 25 MILLIGRAM(S): 50 TABLET ORAL at 19:17

## 2019-12-11 RX ADMIN — SODIUM CHLORIDE 3 MILLILITER(S): 9 INJECTION INTRAMUSCULAR; INTRAVENOUS; SUBCUTANEOUS at 10:56

## 2019-12-11 RX ADMIN — TRAMADOL HYDROCHLORIDE 50 MILLIGRAM(S): 50 TABLET ORAL at 17:22

## 2019-12-11 RX ADMIN — TRAMADOL HYDROCHLORIDE 50 MILLIGRAM(S): 50 TABLET ORAL at 03:51

## 2019-12-11 RX ADMIN — Medication 2 MILLIGRAM(S): at 06:07

## 2019-12-11 RX ADMIN — SODIUM CHLORIDE 3 MILLILITER(S): 9 INJECTION INTRAMUSCULAR; INTRAVENOUS; SUBCUTANEOUS at 06:05

## 2019-12-11 RX ADMIN — POLYETHYLENE GLYCOL 3350 17 GRAM(S): 17 POWDER, FOR SOLUTION ORAL at 10:56

## 2019-12-11 RX ADMIN — FAMOTIDINE 20 MILLIGRAM(S): 10 INJECTION INTRAVENOUS at 17:22

## 2019-12-11 RX ADMIN — SENNA PLUS 2 TABLET(S): 8.6 TABLET ORAL at 21:38

## 2019-12-11 RX ADMIN — Medication 2 MILLIGRAM(S): at 17:22

## 2019-12-11 RX ADMIN — TRAMADOL HYDROCHLORIDE 25 MILLIGRAM(S): 50 TABLET ORAL at 20:00

## 2019-12-11 RX ADMIN — Medication 2 MILLIGRAM(S): at 09:32

## 2019-12-11 RX ADMIN — TRAMADOL HYDROCHLORIDE 50 MILLIGRAM(S): 50 TABLET ORAL at 03:21

## 2019-12-11 NOTE — PROGRESS NOTE ADULT - SUBJECTIVE AND OBJECTIVE BOX
Subjective: Patient seen and examined. No new events except as noted.     SUBJECTIVE/ROS:  No chest pain, dyspnea, palpitation, or dizziness.       MEDICATIONS:  MEDICATIONS  (STANDING):  ALPRAZolam 0.25 milliGRAM(s) Oral once  dexAMETHasone     Tablet   Oral   dexAMETHasone     Tablet 2 milliGRAM(s) Oral every 12 hours  enoxaparin Injectable 40 milliGRAM(s) SubCutaneous at bedtime  famotidine    Tablet 20 milliGRAM(s) Oral two times a day  polyethylene glycol 3350 17 Gram(s) Oral daily  senna 2 Tablet(s) Oral at bedtime  sodium chloride 0.9% lock flush 3 milliLiter(s) IV Push every 8 hours  traZODone 100 milliGRAM(s) Oral at bedtime      PHYSICAL EXAM:  T(C): 36.7 (12-11-19 @ 08:51), Max: 37.2 (12-10-19 @ 19:55)  HR: 72 (12-11-19 @ 08:51) (72 - 84)  BP: 157/89 (12-11-19 @ 08:51) (122/75 - 157/89)  RR: 20 (12-11-19 @ 08:51) (18 - 20)  SpO2: 99% (12-11-19 @ 08:51) (95% - 99%)  Wt(kg): --  I&O's Summary          JVP: Normal  Neck: supple  Lung: clear   CV: S1 S2 , Murmur:  Abd: soft  Ext: No edema  neuro: Awake / alert  Psych: flat affect  Skin: normal``    LABS/DATA:    CARDIAC MARKERS:                  proBNP:   Lipid Profile:   HgA1c:   TSH:     TELE:  EKG:

## 2019-12-11 NOTE — BEHAVIORAL HEALTH ASSESSMENT NOTE - SUICIDE RISK FACTORS
Access to lethal methods (pills, firearm, etc.: Ask specifically about presence or absence of a firearm in the home or ease of accessing/Insomnia/Anhedonia/Cluster B Personality disorders or traits current/past/Current mood episode/Agitation/Severe Anxiety/Panic

## 2019-12-11 NOTE — DISCHARGE NOTE PROVIDER - HOSPITAL COURSE
49F w/pmhx of gastritis, migraine headaches, anxiety/depression p/w neck pain with radiculopathy found to have severe cervical stenosis worst at C4-6 with cord signal change.     On 12/8/19 pt is s/p C4-5 ACDF. Surgical drain removed on post op day #2. Pt started on decadron for post op edema and expected throat pain. Pt received valium prn for anxiety. Psychiatry consult called for increased anxiety/depression, likely exacerbated by steroids, surgery and hospitalization and difficulty sleeping. Medication recommendations appreciated. On day of discharge pt is medically and neurologically stable. 49F w/pmhx of gastritis, migraine headaches, anxiety/depression p/w neck pain with radiculopathy found to have severe cervical stenosis worst at C4-6 with cord signal change.     On 12/8/19 pt is s/p C4-5 ACDF. Surgical drain removed on post op day #2. Pt started on decadron for post op edema and expected throat pain. Pt received valium prn for anxiety. Psychiatry consult called for increased anxiety/depression, likely exacerbated by steroids, surgery and hospitalization and difficulty sleeping. Medication recommendations appreciated. On day of discharge pt is medically and neurologically stable.         Addendum 12/17/19 decadron given for post op dysphagia and hoarseness which     greatly improved at time of discharge.

## 2019-12-11 NOTE — DISCHARGE NOTE PROVIDER - CARE PROVIDER_API CALL
Lenard Mcnulty (DO)  Neurological Surgery  [address] [place] [address], [address]  [place] [address]  Phone: [phone]  Fax: [phone]  Scheduled Appointment: 12/20/2019 11:30 AM

## 2019-12-11 NOTE — DISCHARGE NOTE PROVIDER - REASON FOR ADMISSION
severe cervical stenosis 12/8/19 s/p C4-5 ACDF (anterior cervical discectomy and fusion) for severe cervical stenosis. 12/8/19 s/p C4-5 ACDF (anterior cervical discectomy and fusion) by severe cervical stenosis.

## 2019-12-11 NOTE — BEHAVIORAL HEALTH ASSESSMENT NOTE - DESCRIPTION (FIRST USE, LAST USE, QUANTITY, FREQUENCY, DURATION)
Former smoker, quit years ago Reports infrequent and brief use of marijuana after son's death "to relax myself" remote hx of benzo abuse. (per records)

## 2019-12-11 NOTE — BEHAVIORAL HEALTH ASSESSMENT NOTE - SUICIDE PROTECTIVE FACTORS
Identifies reasons for living/Supportive social network of family or friends/Positive therapeutic relationships

## 2019-12-11 NOTE — BEHAVIORAL HEALTH ASSESSMENT NOTE - NSBHCHARTREVIEWINVESTIGATE_PSY_A_CORE FT
Ventricular Rate 83 BPM    Atrial Rate 83 BPM    P-R Interval 144 ms    QRS Duration 70 ms    Q-T Interval 380 ms    QTC Calculation(Bezet) 446 ms    P Axis 21 degrees    R Axis 29 degrees    T Axis 22 degrees    Diagnosis Line NORMAL SINUS RHYTHM  POSSIBLE LEFT ATRIAL ENLARGEMENT      Confirmed by JUAN JOSE DORSEY MD ([id number]) on 12/8/2019 2:32:19 PM

## 2019-12-11 NOTE — BEHAVIORAL HEALTH ASSESSMENT NOTE - OTHER PAST PSYCHIATRIC HISTORY (INCLUDE DETAILS REGARDING ONSET, COURSE OF ILLNESS, INPATIENT/OUTPATIENT TREATMENT)
See HPI borderline PD, multiple prior psychiatric admissions (last at [facility] in 04/2019, multiple prior suicide attempts by overdose, last attempted in 2011/2012
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
## 2019-12-11 NOTE — DISCHARGE NOTE PROVIDER - NSDCMRMEDTOKEN_GEN_ALL_CORE_FT
famotidine 20 mg oral tablet: 1 tab(s) orally 2 times a day  melatonin 3 mg oral tablet:  orally   pantoprazole 40 mg oral delayed release tablet: 1 tab(s) orally once a day (before a meal)  traZODone 100 mg oral tablet: 1 tab(s) orally once a day (at bedtime) acetaminophen 325 mg oral tablet: 2 tab(s) orally every 6 hours, As needed, Temp greater or equal to 38C (100.4F), Mild Pain (1 - 3)  bisacodyl 10 mg rectal suppository: 1 suppository(ies) rectal once a day, As needed, Constipation  diazePAM 2 mg oral tablet: 1 tab(s) orally every 6 hours, As needed, spasms MDD:4  famotidine 20 mg oral tablet: 1 tab(s) orally 2 times a day  melatonin 3 mg oral tablet:  orally   oxyCODONE 5 mg oral tablet: 1-2 tab(s) orally every 4 hours, As needed, Moderate Pain MDD:6  pantoprazole 40 mg oral delayed release tablet: 1 tab(s) orally once a day (before a meal)  polyethylene glycol 3350 oral powder for reconstitution: 17 gram(s) orally once a day  senna oral tablet: 2 tab(s) orally once a day (at bedtime)  traZODone 150 mg oral tablet: 1 tab(s) orally once a day (at bedtime)

## 2019-12-11 NOTE — PROGRESS NOTE ADULT - SUBJECTIVE AND OBJECTIVE BOX
SUBJECTIVE: Pt seen and examined, anxious and crying this morning. She reports anxiety and depression at baseline with suicidal attempts but may be worsened with po steroids, recent surgery and hospitalization. Pt reports she has a psychiatrist at [facility], Dr Karen Garcia, and has stopped all meds >6 months. Psych consult called for discharge medication recommendations. She reports she still has throat pain but it is better. Shooting pain down right arm has resolved since surgery.    OVERNIGHT EVENTS: none    Vital Signs Last 24 Hrs  T(C): 36.7 (11 Dec 2019 08:51), Max: 37.2 (10 Dec 2019 19:55)  T(F): 98 (11 Dec 2019 08:51), Max: 98.9 (10 Dec 2019 19:55)  HR: 72 (11 Dec 2019 08:51) (72 - 84)  BP: 157/89 (11 Dec 2019 08:51) (122/75 - 157/89)  BP(mean): --  RR: 20 (11 Dec 2019 08:51) (18 - 20)  SpO2: 99% (11 Dec 2019 08:51) (95% - 99%)    PHYSICAL EXAM:    General: No Acute Distress     Neurological: Awake, alert oriented to person, place and time, Following Commands, Speech Fluent, Moving all extremities, Muscle Strength normal in all four extremities, No Drift, Sensation to Light Touch Intact    Pulmonary: Clear to Auscultation, No Rales, No Rhonchi, No Wheezes     Cardiovascular: S1, S2, Regular Rate and Rhythm     Gastrointestinal: Soft, Nontender, Nondistended     Incision: ant neck steri-strips- c/d/i    LABS:         Hemoglobin A1C, Whole Blood: 5.6 % (12-07 @ 09:58)      DRAINS: none    MEDICATIONS:  Antibiotics:    Neuro:  acetaminophen   Tablet .. 650 milliGRAM(s) Oral every 6 hours PRN Temp greater or equal to 38C (100.4F), Mild Pain (1 - 3)  ALPRAZolam 0.25 milliGRAM(s) Oral once  cyclobenzaprine 10 milliGRAM(s) Oral three times a day PRN Muscle Spasm  diazepam    Tablet 2 milliGRAM(s) Oral every 6 hours PRN anxiety  melatonin 3 milliGRAM(s) Oral at bedtime PRN Insomnia  ondansetron Injectable 4 milliGRAM(s) IV Push every 6 hours PRN Nausea and/or Vomiting  traMADol 25 milliGRAM(s) Oral every 4 hours PRN Moderate Pain (4 - 6)  traMADol 50 milliGRAM(s) Oral every 4 hours PRN Severe Pain (7 - 10)  traZODone 100 milliGRAM(s) Oral at bedtime    Cardiac:    Pulm:    GI/:  bisacodyl Suppository 10 milliGRAM(s) Rectal daily PRN Constipation  famotidine    Tablet 20 milliGRAM(s) Oral two times a day  polyethylene glycol 3350 17 Gram(s) Oral daily  senna 2 Tablet(s) Oral at bedtime    Other:   dexAMETHasone     Tablet   Oral   dexAMETHasone     Tablet 2 milliGRAM(s) Oral every 12 hours  dextrose 40% Gel 15 Gram(s) Oral once PRN Blood Glucose LESS THAN 70 milliGRAM(s)/deciliter  dextrose 5%. 1000 milliLiter(s) IV Continuous <Continuous>  dextrose 50% Injectable 12.5 Gram(s) IV Push once  dextrose 50% Injectable 25 Gram(s) IV Push once  dextrose 50% Injectable 25 Gram(s) IV Push once  enoxaparin Injectable 40 milliGRAM(s) SubCutaneous at bedtime  glucagon  Injectable 1 milliGRAM(s) IntraMuscular once PRN Glucose LESS THAN 70 milligrams/deciliter  insulin lispro (HumaLOG) corrective regimen sliding scale   SubCutaneous three times a day before meals  insulin lispro (HumaLOG) corrective regimen sliding scale   SubCutaneous at bedtime  sodium chloride 0.9% lock flush 3 milliLiter(s) IV Push every 8 hours    DIET: [x] Regular [] CCD [] Renal [] Puree [] Dysphagia [] Tube Feeds:     IMAGING: SUBJECTIVE: Pt seen and examined, anxious and crying this morning. She reports anxiety and depression at baseline with suicidal attempts in the past, denies suicidal thoughts now.  Anxiety may be worsened with po steroids, recent surgery and hospitalization. Pt reports she has a psychiatrist at [facility], Dr aKren Garcia, and has stopped all meds >6 months. Psych consult called for discharge medication recommendations. She reports she still has throat pain but it is better. Shooting pain down right arm has resolved since surgery.    OVERNIGHT EVENTS: none    Vital Signs Last 24 Hrs  T(C): 36.7 (11 Dec 2019 08:51), Max: 37.2 (10 Dec 2019 19:55)  T(F): 98 (11 Dec 2019 08:51), Max: 98.9 (10 Dec 2019 19:55)  HR: 72 (11 Dec 2019 08:51) (72 - 84)  BP: 157/89 (11 Dec 2019 08:51) (122/75 - 157/89)  BP(mean): --  RR: 20 (11 Dec 2019 08:51) (18 - 20)  SpO2: 99% (11 Dec 2019 08:51) (95% - 99%)    PHYSICAL EXAM:    General: No Acute Distress     Neurological: Awake, alert oriented to person, place and time, Following Commands, Speech Fluent, Moving all extremities, Muscle Strength normal in all four extremities, No Drift, Sensation to Light Touch Intact    Pulmonary: Clear to Auscultation, No Rales, No Rhonchi, No Wheezes     Cardiovascular: S1, S2, Regular Rate and Rhythm     Gastrointestinal: Soft, Nontender, Nondistended     Incision: ant neck steri-strips- c/d/i    LABS:         Hemoglobin A1C, Whole Blood: 5.6 % (12-07 @ 09:58)      DRAINS: none    MEDICATIONS:  Antibiotics:    Neuro:  acetaminophen   Tablet .. 650 milliGRAM(s) Oral every 6 hours PRN Temp greater or equal to 38C (100.4F), Mild Pain (1 - 3)  ALPRAZolam 0.25 milliGRAM(s) Oral once  cyclobenzaprine 10 milliGRAM(s) Oral three times a day PRN Muscle Spasm  diazepam    Tablet 2 milliGRAM(s) Oral every 6 hours PRN anxiety  melatonin 3 milliGRAM(s) Oral at bedtime PRN Insomnia  ondansetron Injectable 4 milliGRAM(s) IV Push every 6 hours PRN Nausea and/or Vomiting  traMADol 25 milliGRAM(s) Oral every 4 hours PRN Moderate Pain (4 - 6)  traMADol 50 milliGRAM(s) Oral every 4 hours PRN Severe Pain (7 - 10)  traZODone 100 milliGRAM(s) Oral at bedtime    Cardiac:    Pulm:    GI/:  bisacodyl Suppository 10 milliGRAM(s) Rectal daily PRN Constipation  famotidine    Tablet 20 milliGRAM(s) Oral two times a day  polyethylene glycol 3350 17 Gram(s) Oral daily  senna 2 Tablet(s) Oral at bedtime    Other:   dexAMETHasone     Tablet   Oral   dexAMETHasone     Tablet 2 milliGRAM(s) Oral every 12 hours  dextrose 40% Gel 15 Gram(s) Oral once PRN Blood Glucose LESS THAN 70 milliGRAM(s)/deciliter  dextrose 5%. 1000 milliLiter(s) IV Continuous <Continuous>  dextrose 50% Injectable 12.5 Gram(s) IV Push once  dextrose 50% Injectable 25 Gram(s) IV Push once  dextrose 50% Injectable 25 Gram(s) IV Push once  enoxaparin Injectable 40 milliGRAM(s) SubCutaneous at bedtime  glucagon  Injectable 1 milliGRAM(s) IntraMuscular once PRN Glucose LESS THAN 70 milligrams/deciliter  insulin lispro (HumaLOG) corrective regimen sliding scale   SubCutaneous three times a day before meals  insulin lispro (HumaLOG) corrective regimen sliding scale   SubCutaneous at bedtime  sodium chloride 0.9% lock flush 3 milliLiter(s) IV Push every 8 hours    DIET: [x] Regular [] CCD [] Renal [] Puree [] Dysphagia [] Tube Feeds:     IMAGING:

## 2019-12-11 NOTE — DISCHARGE NOTE PROVIDER - NSDCACTIVITY_GEN_ALL_CORE
Walking - Indoors allowed/No heavy lifting/straining/Bathing allowed/Showering allowed/Do not drive or operate machinery/Walking - Outdoors allowed/Do not make important decisions/Stairs allowed

## 2019-12-11 NOTE — PROGRESS NOTE ADULT - ASSESSMENT
49 f with     Cervical stenosis  - pain control  - steroids taper  - postop care    BS control while on steroids    HTN control    Depression and Anxiety  - continue Rx  - Psych evaluation    Gastritis  - PPI    d/w patient     Haresh Dubois MD pager [phone]

## 2019-12-11 NOTE — BEHAVIORAL HEALTH ASSESSMENT NOTE - CASE SUMMARY
49F, , domiciled with partner of 6 years, disabled, has an adult son who is living and a 2nd son who  of an opiate overdose 2.5 years ago, with PPH of mood disorder and borderline PD, multiple prior psychiatric admissions (last at [facility] in 2019, multiple prior suicide attempts by overdose, last attempted in , in tx at [facility] AOPD with Dr. Karen Garcia, with no active substance abuse, PMH significant for gastritis, radiculopathy and migraine headaches, who presented to [facility] ED with severe cervical stenosis worst at C4-6 with spinal signal change, had an anterior cervical discectomy and fusion on 19, subsequently received dexamethasone, psychiatry is consulted because patient is tearful, anxious and depressed.  Pt reports being psychiatrically stable for many months, recently graduated DBT at [facility] in Oct, was continuing monthly visits with her psychiatrists and additional group therapies.  States since hospitalization, she has been more anxious and tearful due to pain, less able to utilize her coping skills to manage emotional distress.  Sleeping poorly, feels it may be 2/2 steroids given in hospital.  Denies SI/HI.  Partner denies acute psychiatric safety concerns, but feels pt is not ready to go home, still having issues with pain management.  Agree with fellow's assessment and plan as above.

## 2019-12-11 NOTE — PROGRESS NOTE ADULT - SUBJECTIVE AND OBJECTIVE BOX
Patient is a 49y old  Female who presents with a chief complaint of severe cervical stenosis (11 Dec 2019 16:03)      SUBJECTIVE / OVERNIGHT EVENTS: c/o HA, anxious  Review of Systems  chest pain no  palpitations no  sob no  nausea no  headache no    MEDICATIONS  (STANDING):  ALPRAZolam 0.25 milliGRAM(s) Oral once  dexAMETHasone     Tablet   Oral   dexAMETHasone     Tablet 2 milliGRAM(s) Oral every 12 hours  enoxaparin Injectable 40 milliGRAM(s) SubCutaneous at bedtime  famotidine    Tablet 20 milliGRAM(s) Oral two times a day  polyethylene glycol 3350 17 Gram(s) Oral daily  senna 2 Tablet(s) Oral at bedtime  sodium chloride 0.9% lock flush 3 milliLiter(s) IV Push every 8 hours  traZODone 150 milliGRAM(s) Oral at bedtime    MEDICATIONS  (PRN):  acetaminophen   Tablet .. 650 milliGRAM(s) Oral every 6 hours PRN Temp greater or equal to 38C (100.4F), Mild Pain (1 - 3)  bisacodyl Suppository 10 milliGRAM(s) Rectal daily PRN Constipation  cyclobenzaprine 10 milliGRAM(s) Oral three times a day PRN Muscle Spasm  diazepam    Tablet 2 milliGRAM(s) Oral every 6 hours PRN anxiety  melatonin 3 milliGRAM(s) Oral at bedtime PRN Insomnia  ondansetron Injectable 4 milliGRAM(s) IV Push every 6 hours PRN Nausea and/or Vomiting  QUEtiapine 25 milliGRAM(s) Oral every 6 hours PRN anxiety  traMADol 25 milliGRAM(s) Oral every 4 hours PRN Moderate Pain (4 - 6)  traMADol 50 milliGRAM(s) Oral every 4 hours PRN Severe Pain (7 - 10)      Vital Signs Last 24 Hrs  T(C): 36.6 (11 Dec 2019 16:16), Max: 37.2 (10 Dec 2019 19:55)  T(F): 97.9 (11 Dec 2019 16:16), Max: 98.9 (10 Dec 2019 19:55)  HR: 71 (11 Dec 2019 16:16) (71 - 84)  BP: 133/85 (11 Dec 2019 16:16) (122/75 - 157/89)  BP(mean): --  RR: 18 (11 Dec 2019 16:16) (18 - 20)  SpO2: 94% (11 Dec 2019 16:16) (94% - 99%)    PHYSICAL EXAM:  GENERAL: NAD  HEAD:  Atraumatic, Normocephalic  EYES: EOMI, PERRLA, conjunctiva and sclera clear  NECK: Supple, No JVD  CHEST/LUNG: Clear to auscultation bilaterally; No wheeze  HEART: Regular rate and rhythm; No murmurs, rubs, or gallops  ABDOMEN: Soft, Nontender, Nondistended; Bowel sounds present  EXTREMITIES:  2+ Peripheral Pulses, No clubbing, cyanosis, or edema  PSYCH: Anxious  NEUROLOGY: non-focal  SKIN: No rashes or lesions    LABS:                      RADIOLOGY & ADDITIONAL TESTS:    Imaging Personally Reviewed:    Consultant(s) Notes Reviewed:      Care Discussed with Consultants/Other Providers:

## 2019-12-11 NOTE — BEHAVIORAL HEALTH ASSESSMENT NOTE - NSBHCONSULTRECOMMENDOTHER_PSY_A_CORE FT
Optimize pain control, consider pain consult   Minimize night procedures/protocol to ensure less interruptions in sleep  Hold Seroquel if Qtc >500ms

## 2019-12-11 NOTE — BEHAVIORAL HEALTH ASSESSMENT NOTE - NSBHCONSULTFOLLOWAFTERCARE_PSY_A_CORE FT
ZACH LARA with Dr. Karen Garcia (has appointment scheduled on 01/09/19). Pt advised for a closer follow up within a week of discharge

## 2019-12-11 NOTE — BEHAVIORAL HEALTH ASSESSMENT NOTE - NSBHCHARTREVIEWIMAGING_PSY_A_CORE FT
EXAM:  CT CERVICAL SPINE                            PROCEDURE DATE:  12/06/2019            INTERPRETATION:  HISTORY: Neck pain and right upper extremity pain for 2   months. Evaluate spinal cord injury.    COMPARISON: None available.    TECHNIQUE: Noncontrast CT of the cervical spine was performed. Sagittal   and coronal reformats were performed.    FINDINGS:     Straightening of the normal cervical lordosis, which could be related to   patient positioning or muscle spasm.    There is no prevertebral soft tissue swelling. Vertebral body heights and   alignment are maintained without compression deformity or subluxation.     The atlantodental and atlanto-occipital joints are maintained. Articular   facets and posterior elements alignment is maintained.    Disc bulges, disc osteophyte complexes, facet degenerative changes, and   ligamentum flavum hypertrophy result in multilevel spinal canal stenosis   and neural foraminal narrowing, the overall degree of which is not well   demonstrated with CT technique. However the canal stenosis and neural   foraminal narrowing appears at least moderate at the C4-C5 level.   Consider follow cervical spine MRI for further workup if clinically   warranted.    The visualized neck soft tissues are unremarkable.    The partially imaged lung apices are clear.     IMPRESSION:     Degenerative changes involve the cervical spine as described, most   pronounced at the C4-C5 level. Consider follow up cervical spine MRI for   further workup if clinically warranted.

## 2019-12-11 NOTE — DISCHARGE NOTE PROVIDER - NSDCFUADDAPPT_GEN_ALL_CORE_FT
Make appointment for follow up with your Primary Care Physician in 1 week. Make appointment for follow up with your Primary Care Physician in 1 week.  Please make appointment with your psychiatrist within 1 week.

## 2019-12-11 NOTE — BEHAVIORAL HEALTH ASSESSMENT NOTE - PAST PSYCHOTROPIC MEDICATION
Pt reports trials with multiple psychotropic medications including moo stabilizers (Depakote and Lithium), antidepressants (Prozac, wellbutrin). The only medication she remembers having "better" response is "Prozac". Pt reports trials with multiple psychotropic medications including mood stabilizers (Depakote and Lithium), antidepressants (Prozac, wellbutrin). The only medication she remembers having "better" response is "Prozac".

## 2019-12-11 NOTE — PROGRESS NOTE ADULT - ASSESSMENT
HTN  cont current meds     Depression   consider psych eval     DVT proph  lovenox post op once deemed safe    Gi proph  on Pepcid

## 2019-12-11 NOTE — BEHAVIORAL HEALTH ASSESSMENT NOTE - HPI (INCLUDE ILLNESS QUALITY, SEVERITY, DURATION, TIMING, CONTEXT, MODIFYING FACTORS, ASSOCIATED SIGNS AND SYMPTOMS)
50 y/o  woman, , domiciled with BF of 6 years, disabled, has an adult son who is living and a 2nd son who  of an opiate overdose 2.5 years ago, with PPH of borderline PD, multiple prior psychiatric admissions (last at [facility] in 2019, multiple prior suicide attempts by overdose, last attempted in , no reported hx of violence/legal problems, brief use of marijuana after his son's death 2.5 years ago, PMH of gastritis, radiculopathy and migraine headaches, who presented to [facility] ED with severe cervical stenosis worst at C4-6 with spinal signal change,  had an anterior cervical discectomy and fusion on 19 , subsequently received Dexamethasone.   Psychiatry is consulted because patient appears tearful, anxious and depressed.  Patient appears tearful during evaluation. She reports feeling very anxious, depressed, emotional and overwhelmed due to being in a lot of pain. Per patient, she cannot sleep at night which makes her symptoms worse. She cannot sleep because of post-surgical pain and due to external disturbances like the other patient in room making noise, frequently calling nursing staff for help. She states that Tylenol does not help her pain much and she feels "ashamed" of being in this situation. She does not like "complaining too much". She reports having trouble sleeping at home due to pain in her arm and neck which she had been experiencing for a while. She had previously seen Orthopedic surgeon and receiving Steroid injections after which her pain got better for some time but it came back again and now her Neurosurgeon told her she had severe cervical stenosis and referred her for surgery at [facility]. Per patient, she is still in a lot of pain after the surgery which is causing worsening of her mood symptoms, due to which she does not feel ready for discharge. She reports being given Valium this morning which helped her calm down a little bit.   Patient otherwise reports doing well psychiatrically since her last admission at [facility] in . She reports regularly seeing Dr. Garcia and completing DBT program in October this year. She is also in waiting list for 1:1 therapy. per patient, she usually feels more emotional during her periods and informs that she is on her periods at this time. She denies any suicidal/homicidal ideation. Denies all forms of hallucinations and lawrence symptoms. No delusions elicited.   As per collateral information obtained by her fiance Mr. Wong, patient was doing fairly well at home, however she felt overwhelmed because of her recent spinal surgery. He states that patient is still in pain which makes her very anxious and she cries hysterically. He believes that patient is not medically ready for discharge due to persistent pain. He otherwise denies any psychiatric or safety concerns regarding patient and denies any recent self harming or suicidal behaviour by patient.  Team attempted to call Dr. Garcia to discuss adjustments in patient's medication regimen, however it appears that Dr. Garcia is away until 19. (voicemail recording). 48 y/o  woman, , domiciled with BF of 6 years, disabled, has an adult son who is living and a 2nd son who  of an opiate overdose 2.5 years ago, with PPH of borderline PD, multiple prior psychiatric admissions (last at [facility] in 2019, multiple prior suicide attempts by overdose, last attempted in , no reported hx of violence/legal problems, brief use of marijuana after his son's death 2.5 years ago, PMH of gastritis, radiculopathy and migraine headaches, who presented to [facility] ED with severe cervical stenosis worst at C4-6 with spinal signal change,  had an anterior cervical discectomy and fusion on 19 , subsequently received Dexamethasone. Psychiatry is consulted because patient is tearful, anxious and depressed.    Patient tearful during evaluation. She reports feeling very anxious, depressed, emotional and overwhelmed due to being in a lot of pain. Per patient, she cannot sleep at night which makes her symptoms worse. She cannot sleep because of post-surgical pain and due to external disturbances like the other patient in room making noise, frequently calling nursing staff for help. She states that Tylenol does not help her pain much and she feels "ashamed" of being in this situation. She does not like "complaining too much". She reports having trouble sleeping at home due to pain in her arm and neck which she had been experiencing for a while. She had previously seen Orthopedic surgeon and receiving Steroid injections after which her pain got better for some time but it came back again and now her Neurosurgeon told her she had severe cervical stenosis and referred her for surgery at [facility]. Per patient, she is still in a lot of pain after the surgery which is causing worsening of her mood symptoms, due to which she does not feel ready for discharge. She reports being given Valium this morning which helped her calm down a little bit.     Patient otherwise reports doing well psychiatrically since her last admission at [facility] in . She reports regularly seeing Dr. Garcia and completing DBT program in October this year. She is also in waiting list for 1:1 therapy. per patient, she usually feels more emotional during her periods and informs that she is on her periods at this time. She denies any suicidal/homicidal ideation. Denies all forms of hallucinations and lawrence symptoms. No delusions elicited.     As per collateral information obtained by her fiance Mr. Wong, patient was doing fairly well at home, however she felt overwhelmed because of her recent spinal surgery. He states that patient is still in pain which makes her very anxious and she cries hysterically. He believes that patient is not medically ready for discharge due to persistent pain. He otherwise denies any psychiatric or safety concerns regarding patient and denies any recent self harming or suicidal behaviour by patient.    Team attempted to call Dr. Garcia to discuss adjustments in patient's medication regimen, however it appears that Dr. Garcia is away until 19. (voicemail recording).

## 2019-12-11 NOTE — DISCHARGE NOTE PROVIDER - NSDCCPCAREPLAN_GEN_ALL_CORE_FT
PRINCIPAL DISCHARGE DIAGNOSIS  Diagnosis: Stenosis, cervical spine  Assessment and Plan of Treatment: 12/6/19 s/p C4-5 ACDF      SECONDARY DISCHARGE DIAGNOSES  Diagnosis: Borderline personality disorder  Assessment and Plan of Treatment: Continue increased dose of trazadone, seroquel as needed. Follow up with your Psychiatrist Dr Karen Garcia at [facility] as outpatient. PRINCIPAL DISCHARGE DIAGNOSIS  Diagnosis: Stenosis, cervical spine  Assessment and Plan of Treatment: 12/6/19 s/p C4-5 ACDF  Dr Mcnulty- neurosurgeon- please call office for appointment in 1 week.      SECONDARY DISCHARGE DIAGNOSES  Diagnosis: Borderline personality disorder  Assessment and Plan of Treatment: Continue increased dose of trazadone, seroquel as needed. Follow up with your Psychiatrist Dr Karen Garcia at [facility] as outpatient in 1 week.

## 2019-12-11 NOTE — BEHAVIORAL HEALTH ASSESSMENT NOTE - NSBHCHARTREVIEWVS_PSY_A_CORE FT
Vital Signs Last 24 Hrs  T(C): 36.7 (11 Dec 2019 12:25), Max: 37.2 (10 Dec 2019 19:55)  T(F): 98.1 (11 Dec 2019 12:25), Max: 98.9 (10 Dec 2019 19:55)  HR: 81 (11 Dec 2019 12:25) (72 - 84)  BP: 131/85 (11 Dec 2019 12:25) (122/75 - 157/89)  BP(mean): --  RR: 19 (11 Dec 2019 12:25) (18 - 20)  SpO2: 96% (11 Dec 2019 12:25) (95% - 99%)

## 2019-12-11 NOTE — BEHAVIORAL HEALTH ASSESSMENT NOTE - RISK ASSESSMENT
Low Acute Suicide Risk Risk factors include multiple prior suicide attempt, insomnia, severe anxiety,, h/o BPPD, loss of adult son, documented h/o THC/BZD abuse, multiple psychiatric admissions, chronic pain.  Protective factors: Residential stability, relationship stability, supportive fiance and son, no current SI, help seeking, receptive to tx, no active substance abuse

## 2019-12-11 NOTE — BEHAVIORAL HEALTH ASSESSMENT NOTE - SUMMARY
50 y/o  woman, , domiciled with BF of 6 years, disabled, has an adult son who is living and a 2nd son who  of an opiate overdose 2.5 years ago, with PPH of borderline PD, multiple prior psychiatric admissions (last at [facility] in 2019, multiple prior suicide attempts by overdose, last attempted in , no reported hx of violence/legal problems, brief use of marijuana after his son's death 2.5 years ago, PMH of gastritis, radiculopathy and migraine headaches, who presented to [facility] ED with severe cervical stenosis worst at C4-6 with spinal signal change,  had an anterior cervical discectomy and fusion on 19 , subsequently received Dexamethasone.   Psychiatry is consulted because patient appears tearful, anxious and depressed.  Patient presents with worsening of mood, sleep, anxiety and excessive tearfulness in context of various factors including pain, being on monthly cycle and recent tx with steroids, given underlying h/o Borderline personality disorder. She denies any SI/HI, manic and psychotic symptoms and does not require psychiatric admission at this time. We recommend increasing dose of Trazodone to 150 mg po bedtime for better sleep, in addition to Seroquel 25 mg po q 6 hr PRN severe intermittent anxiety. 50 y/o  woman, , domiciled with BF of 6 years, disabled, has an adult son who is living and a 2nd son who  of an opiate overdose 2.5 years ago, with PPH of borderline PD, multiple prior psychiatric admissions (last at [facility] in 2019, multiple prior suicide attempts by overdose, last attempted in , no reported hx of violence/legal problems, brief use of marijuana after his son's death 2.5 years ago, PMH of gastritis, radiculopathy and migraine headaches, who presented to [facility] ED with severe cervical stenosis worst at C4-6 with spinal signal change,  had an anterior cervical discectomy and fusion on 19 , subsequently received Dexamethasone. Psychiatry is consulted because patient appears tearful, anxious and depressed.    Patient presents with worsening of mood, sleep, anxiety and excessive tearfulness in context of various factors including pain, being on monthly cycle and recent tx with steroids, given underlying h/o Borderline personality disorder. She denies any SI/HI, manic and psychotic symptoms and does not require psychiatric admission at this time. We recommend increasing dose of Trazodone to 150 mg po bedtime for better sleep, in addition to Seroquel 25 mg po q 6 hr PRN severe intermittent anxiety.

## 2019-12-11 NOTE — PROGRESS NOTE ADULT - ASSESSMENT
49F no major pmhx with neck pain with radiculopathy found to have severe cervical stenosis worst at C4-6 with cord signal change.     PROCEDURE:  12/8 s/p C4-5 ACDF      PLAN:  Neuro:   -anxiety/depression- Psych consult called for discharge medication recomendations  - continue decadron for post op edema, fast taper  - anxiety- valium 2mg q6 prn  - pain control- tramadol prn, flexeril prn spasm  Respiratory:   - encouraged incentive spirometry  CV:  - vitals stable  Endocrine:   - dc fingersticks, all <150  DVT ppx:   -venodynes, sq lovenox   PT/OT:   outpatient PT    Assessment:  Please Check When Present   []  GCS  E   V  M     Heart Failure: []Acute, [] acute on chronic , []chronic  Heart Failure:  [] Diastolic (HFpEF), [] Systolic (HFrEF), []Combined (HFpEF and HFrEF), [] RHF, [] Pulm HTN, [] Other    [] SONIA, [] ATN, [] AIN, [] other  [] CKD1, [] CKD2, [] CKD 3, [] CKD 4, [] CKD 5, []ESRD    Encephalopathy: [] Metabolic, [] Hepatic, [] toxic, [] Neurological, [] Other    Abnormal Nurtitional Status: [] malnurtition (see nutrition note), [ ]underweight: BMI < 19, [] morbid obesity: BMI >40, [] Cachexia    [] Sepsis  [] hypovolemic shock,[] cardiogenic shock, [] hemorrhagic shock, [] neuogenic shock  [] Acute Respiratory Failure  []Cerebral edema, [] Brain compression/ herniation,   [] Functional quadriplegia  [] Acute blood loss anemia    Gliph # [id number] 49F no major pmhx with neck pain with radiculopathy found to have severe cervical stenosis worst at C4-6 with cord signal change.     PROCEDURE:  12/8 s/p C4-5 ACDF      PLAN:  Neuro:   - hx of anxiety/depression- Psych consult called for discharge medication recommendations  - continue decadron for post op edema, fast taper  - anxiety- valium 2mg q6 prn  - pain control- tramadol prn, flexeril prn spasm  Respiratory:   - encouraged incentive spirometry  CV:  - vitals stable  Endocrine:   - dc fingersticks, all <150  DVT ppx:   -venodynes, sq lovenox   PT/OT:   outpatient PT    Assessment:  Please Check When Present   []  GCS  E   V  M     Heart Failure: []Acute, [] acute on chronic , []chronic  Heart Failure:  [] Diastolic (HFpEF), [] Systolic (HFrEF), []Combined (HFpEF and HFrEF), [] RHF, [] Pulm HTN, [] Other    [] SONIA, [] ATN, [] AIN, [] other  [] CKD1, [] CKD2, [] CKD 3, [] CKD 4, [] CKD 5, []ESRD    Encephalopathy: [] Metabolic, [] Hepatic, [] toxic, [] Neurological, [] Other    Abnormal Nurtitional Status: [] malnurtition (see nutrition note), [ ]underweight: BMI < 19, [] morbid obesity: BMI >40, [] Cachexia    [] Sepsis  [] hypovolemic shock,[] cardiogenic shock, [] hemorrhagic shock, [] neuogenic shock  [] Acute Respiratory Failure  []Cerebral edema, [] Brain compression/ herniation,   [] Functional quadriplegia  [] Acute blood loss anemia    [employer] # [id number]

## 2019-12-11 NOTE — DISCHARGE NOTE PROVIDER - NSDCFUSCHEDAPPT_GEN_ALL_CORE_FT
KAYLIE HERNANDEZ ; 12/20/2019 ; [facility] KAYLIE HERNANDEZ ; 12/20/2019 ; [facility] KAYLIE HERNANDEZ ; 12/20/2019 ; [facility] KAYLIE HERNANDEZ ; 12/20/2019 ; [facility]

## 2019-12-11 NOTE — DISCHARGE NOTE PROVIDER - NSDCFUADDINST_GEN_ALL_CORE_FT
please notify physician if fevers, bleeding, swelling, pain not relieved by medication, increased sluggishness or irritability, increased nausea or vomiting, inability to tolerate foods or liquids, new or increasing weakness/numbness/tingling.   please do not engage in strenuous activity, heavy lifting, drive, or return to work or school until cleared by surgeon.   please keep incision clean and dry, do not submerge wound in water for prolonged periods of time, pat dry after showering, and do not use any creams or ointments to incision. Please let steri strips fall off. You may shower and remove plastic dressing after and pat dry and leave open to air.

## 2019-12-12 VITALS
HEART RATE: 76 BPM | RESPIRATION RATE: 18 BRPM | OXYGEN SATURATION: 98 % | DIASTOLIC BLOOD PRESSURE: 84 MMHG | TEMPERATURE: 98 F | SYSTOLIC BLOOD PRESSURE: 122 MMHG

## 2019-12-12 PROCEDURE — 83036 HEMOGLOBIN GLYCOSYLATED A1C: CPT

## 2019-12-12 PROCEDURE — 86900 BLOOD TYPING SEROLOGIC ABO: CPT

## 2019-12-12 PROCEDURE — 82947 ASSAY GLUCOSE BLOOD QUANT: CPT

## 2019-12-12 PROCEDURE — 99232 SBSQ HOSP IP/OBS MODERATE 35: CPT | Mod: GC

## 2019-12-12 PROCEDURE — 82962 GLUCOSE BLOOD TEST: CPT

## 2019-12-12 PROCEDURE — 93005 ELECTROCARDIOGRAM TRACING: CPT

## 2019-12-12 PROCEDURE — 84702 CHORIONIC GONADOTROPIN TEST: CPT

## 2019-12-12 PROCEDURE — 76000 FLUOROSCOPY <1 HR PHYS/QHP: CPT

## 2019-12-12 PROCEDURE — 97161 PT EVAL LOW COMPLEX 20 MIN: CPT

## 2019-12-12 PROCEDURE — 84100 ASSAY OF PHOSPHORUS: CPT

## 2019-12-12 PROCEDURE — 99285 EMERGENCY DEPT VISIT HI MDM: CPT

## 2019-12-12 PROCEDURE — 83735 ASSAY OF MAGNESIUM: CPT

## 2019-12-12 PROCEDURE — C1769: CPT

## 2019-12-12 PROCEDURE — 86901 BLOOD TYPING SEROLOGIC RH(D): CPT

## 2019-12-12 PROCEDURE — 72040 X-RAY EXAM NECK SPINE 2-3 VW: CPT

## 2019-12-12 PROCEDURE — 82330 ASSAY OF CALCIUM: CPT

## 2019-12-12 PROCEDURE — 82565 ASSAY OF CREATININE: CPT

## 2019-12-12 PROCEDURE — 82435 ASSAY OF BLOOD CHLORIDE: CPT

## 2019-12-12 PROCEDURE — 80048 BASIC METABOLIC PNL TOTAL CA: CPT

## 2019-12-12 PROCEDURE — C1713: CPT

## 2019-12-12 PROCEDURE — C1889: CPT

## 2019-12-12 PROCEDURE — 80053 COMPREHEN METABOLIC PANEL: CPT

## 2019-12-12 PROCEDURE — 85014 HEMATOCRIT: CPT

## 2019-12-12 PROCEDURE — 82803 BLOOD GASES ANY COMBINATION: CPT

## 2019-12-12 PROCEDURE — 83605 ASSAY OF LACTIC ACID: CPT

## 2019-12-12 PROCEDURE — 84295 ASSAY OF SERUM SODIUM: CPT

## 2019-12-12 PROCEDURE — 72125 CT NECK SPINE W/O DYE: CPT

## 2019-12-12 PROCEDURE — 86850 RBC ANTIBODY SCREEN: CPT

## 2019-12-12 PROCEDURE — 85610 PROTHROMBIN TIME: CPT

## 2019-12-12 PROCEDURE — 84132 ASSAY OF SERUM POTASSIUM: CPT

## 2019-12-12 PROCEDURE — 85027 COMPLETE CBC AUTOMATED: CPT

## 2019-12-12 PROCEDURE — 85730 THROMBOPLASTIN TIME PARTIAL: CPT

## 2019-12-12 RX ORDER — OXYCODONE HYDROCHLORIDE 5 MG/1
1 TABLET ORAL
Qty: 42 | Refills: 0
Start: 2019-12-12

## 2019-12-12 RX ORDER — TRAZODONE HCL 50 MG
1 TABLET ORAL
Qty: 0 | Refills: 0 | DISCHARGE
Start: 2019-12-12

## 2019-12-12 RX ORDER — DIAZEPAM 5 MG
1 TABLET ORAL
Qty: 25 | Refills: 0
Start: 2019-12-12

## 2019-12-12 RX ORDER — ACETAMINOPHEN 500 MG
2 TABLET ORAL
Qty: 0 | Refills: 0 | DISCHARGE
Start: 2019-12-12

## 2019-12-12 RX ORDER — POLYETHYLENE GLYCOL 3350 17 G/17G
17 POWDER, FOR SOLUTION ORAL
Qty: 0 | Refills: 0 | DISCHARGE
Start: 2019-12-12

## 2019-12-12 RX ORDER — OXYCODONE HYDROCHLORIDE 5 MG/1
10 TABLET ORAL EVERY 4 HOURS
Refills: 0 | Status: DISCONTINUED | OUTPATIENT
Start: 2019-12-12 | End: 2019-12-12

## 2019-12-12 RX ORDER — OXYCODONE HYDROCHLORIDE 5 MG/1
5 TABLET ORAL EVERY 4 HOURS
Refills: 0 | Status: DISCONTINUED | OUTPATIENT
Start: 2019-12-12 | End: 2019-12-12

## 2019-12-12 RX ORDER — SENNA PLUS 8.6 MG/1
2 TABLET ORAL
Qty: 0 | Refills: 0 | DISCHARGE
Start: 2019-12-12

## 2019-12-12 RX ADMIN — SODIUM CHLORIDE 3 MILLILITER(S): 9 INJECTION INTRAMUSCULAR; INTRAVENOUS; SUBCUTANEOUS at 05:32

## 2019-12-12 RX ADMIN — Medication 2 MILLIGRAM(S): at 05:36

## 2019-12-12 RX ADMIN — OXYCODONE HYDROCHLORIDE 10 MILLIGRAM(S): 5 TABLET ORAL at 09:52

## 2019-12-12 RX ADMIN — TRAMADOL HYDROCHLORIDE 50 MILLIGRAM(S): 50 TABLET ORAL at 05:40

## 2019-12-12 RX ADMIN — TRAMADOL HYDROCHLORIDE 50 MILLIGRAM(S): 50 TABLET ORAL at 06:11

## 2019-12-12 RX ADMIN — FAMOTIDINE 20 MILLIGRAM(S): 10 INJECTION INTRAVENOUS at 05:35

## 2019-12-12 RX ADMIN — ENOXAPARIN SODIUM 40 MILLIGRAM(S): 100 INJECTION SUBCUTANEOUS at 05:35

## 2019-12-12 RX ADMIN — Medication 2 MILLIGRAM(S): at 08:35

## 2019-12-12 NOTE — PROGRESS NOTE ADULT - ASSESSMENT
49F no major pmhx with neck pain with radiculopathy found to have severe cervical stenosis worst at C4-6 with cord signal change.     PROCEDURE:  12/8/19 s/p C4-5 ACDF    PLAN:  Neuro:   - completed decadron taper for post op edema  - anxiety- valium 2mg q6 prn- working well for pain/spams/anxiety  - pain control- oxycodone PRN pain and valium PRN spasms/anxiety  Respiratory:   - encouraged incentive spirometry  CV:  - vitals stable  Endocrine:   - check fingersticks while on decadron  DVT ppx:   -venodynes, sq lovenox   PT/OT: outpatient PT   d/c IVL and d/c home today  Discussed with patient and family wound care, follow up plans, activity, and medications. Questions answered, and they verbalized understanding.   RXs sent vivo    Will discuss with Dr Pricila Hull # [id number]    Assessment:  Please Check When Present   []  GCS  E   V  M     Heart Failure: []Acute, [] acute on chronic , []chronic  Heart Failure:  [] Diastolic (HFpEF), [] Systolic (HFrEF), []Combined (HFpEF and HFrEF), [] RHF, [] Pulm HTN, [] Other    [] SONIA, [] ATN, [] AIN, [] other  [] CKD1, [] CKD2, [] CKD 3, [] CKD 4, [] CKD 5, []ESRD    Encephalopathy: [] Metabolic, [] Hepatic, [] toxic, [] Neurological, [] Other    Abnormal Nurtitional Status: [] malnurtition (see nutrition note), [ ]underweight: BMI < 19, [] morbid obesity: BMI >40, [] Cachexia    [] Sepsis  [] hypovolemic shock,[] cardiogenic shock, [] hemorrhagic shock, [] neuogenic shock  [] Acute Respiratory Failure  []Cerebral edema, [] Brain compression/ herniation,   [] Functional quadriplegia  [] Acute blood loss anemia

## 2019-12-12 NOTE — PROGRESS NOTE ADULT - SUBJECTIVE AND OBJECTIVE BOX
SUBJECTIVE: Pt seen and examined, reports improved good pain control with change in medication to oxycodone.   Anxiety much better today.     Vital Signs Last 24 Hrs  T(C): 36.4 (12 Dec 2019 13:11), Max: 37 (12 Dec 2019 04:40)  T(F): 97.6 (12 Dec 2019 13:11), Max: 98.6 (12 Dec 2019 04:40)  HR: 76 (12 Dec 2019 13:11) (71 - 88)  BP: 122/84 (12 Dec 2019 13:11) (119/75 - 146/88)  BP(mean): --  RR: 18 (12 Dec 2019 13:11) (18 - 18)  SpO2: 98% (12 Dec 2019 13:11) (94% - 99%)    PHYSICAL EXAM:    General: No Acute Distress     Neurological: Awake, alert oriented to person, place and time, Following Commands,  Speech Fluent, Moving all extremities, Muscle Strength normal in all four extremities, No Drift, Sensation to Light Touch Intact, voice normal    Pulmonary: Clear to Auscultation, No Rales, No Rhonchi, No Wheezes     Cardiovascular: S1, S2, Regular Rate and Rhythm     Gastrointestinal: Soft, Nontender, Nondistended     Incision: ant neck +steri strips c/d/i, flat no evidence of hematoma    LABS:  no new labs           Hemoglobin A1C, Whole Blood: 5.6 % (12-07 @ 09:58)    MEDICATIONS  (STANDING):  enoxaparin Injectable 40 milliGRAM(s) SubCutaneous at bedtime  famotidine    Tablet 20 milliGRAM(s) Oral two times a day  polyethylene glycol 3350 17 Gram(s) Oral daily  senna 2 Tablet(s) Oral at bedtime  sodium chloride 0.9% lock flush 3 milliLiter(s) IV Push every 8 hours  traZODone 150 milliGRAM(s) Oral at bedtime    MEDICATIONS  (PRN):  acetaminophen   Tablet .. 650 milliGRAM(s) Oral every 6 hours PRN Temp greater or equal to 38C (100.4F), Mild Pain (1 - 3)  bisacodyl Suppository 10 milliGRAM(s) Rectal daily PRN Constipation  cyclobenzaprine 10 milliGRAM(s) Oral three times a day PRN Muscle Spasm  diazepam    Tablet 2 milliGRAM(s) Oral every 6 hours PRN anxiety  melatonin 3 milliGRAM(s) Oral at bedtime PRN Insomnia  ondansetron Injectable 4 milliGRAM(s) IV Push every 6 hours PRN Nausea and/or Vomiting  oxyCODONE    IR 5 milliGRAM(s) Oral every 4 hours PRN Moderate Pain (4 - 6)  oxyCODONE    IR 10 milliGRAM(s) Oral every 4 hours PRN Severe Pain (7 - 10)  QUEtiapine 25 milliGRAM(s) Oral every 6 hours PRN anxiety      DIET: [x] Regular [] CCD [] Renal [] Puree [] Dysphagia [] Tube Feeds:     IMAGING:   < from: CT Cervical Spine No Cont (12.06.19 @ 17:08) >    IMPRESSION:     Degenerative changes involve the cervical spine as described, most   pronounced at the C4-C5 level. Consider follow up cervical spine MRI for   further workup if clinically warranted.    < end of copied text >

## 2019-12-12 NOTE — PROGRESS NOTE BEHAVIORAL HEALTH - NSBHCHARTREVIEWINVESTIGATE_PSY_A_CORE FT
Ventricular Rate 83 BPM    Atrial Rate 83 BPM    P-R Interval 144 ms    QRS Duration 70 ms    Q-T Interval 380 ms    QTC Calculation(Bezet) 446 ms    P Axis 21 degrees    R Axis 29 degrees    T Axis 22 degrees    Diagnosis Line NORMAL SINUS RHYTHM  POSSIBLE LEFT ATRIAL ENLARGEMENT      Confirmed by JUAN JOSE DORSEY MD ([id number]) on 12/8/2019 2:32:19 PM

## 2019-12-12 NOTE — PROGRESS NOTE BEHAVIORAL HEALTH - NSBHCHARTREVIEWVS_PSY_A_CORE FT
Vital Signs Last 24 Hrs  T(C): 36.9 (12 Dec 2019 08:52), Max: 37 (12 Dec 2019 04:40)  T(F): 98.4 (12 Dec 2019 08:52), Max: 98.6 (12 Dec 2019 04:40)  HR: 88 (12 Dec 2019 08:52) (71 - 88)  BP: 132/86 (12 Dec 2019 08:52) (119/75 - 146/88)  BP(mean): --  RR: 18 (12 Dec 2019 08:52) (18 - 19)  SpO2: 98% (12 Dec 2019 08:52) (94% - 99%)

## 2019-12-12 NOTE — PROGRESS NOTE ADULT - SUBJECTIVE AND OBJECTIVE BOX
Patient is a 49y old  Female who presents with a chief complaint of severe cervical stenosis (12 Dec 2019 13:37)      SUBJECTIVE / OVERNIGHT EVENTS: Comfortable without new complaints.   Review of Systems  chest pain no  palpitations no  sob no  nausea no  headache no    MEDICATIONS  (STANDING):  enoxaparin Injectable 40 milliGRAM(s) SubCutaneous at bedtime  famotidine    Tablet 20 milliGRAM(s) Oral two times a day  polyethylene glycol 3350 17 Gram(s) Oral daily  senna 2 Tablet(s) Oral at bedtime  sodium chloride 0.9% lock flush 3 milliLiter(s) IV Push every 8 hours  traZODone 150 milliGRAM(s) Oral at bedtime    MEDICATIONS  (PRN):  acetaminophen   Tablet .. 650 milliGRAM(s) Oral every 6 hours PRN Temp greater or equal to 38C (100.4F), Mild Pain (1 - 3)  bisacodyl Suppository 10 milliGRAM(s) Rectal daily PRN Constipation  cyclobenzaprine 10 milliGRAM(s) Oral three times a day PRN Muscle Spasm  diazepam    Tablet 2 milliGRAM(s) Oral every 6 hours PRN anxiety  melatonin 3 milliGRAM(s) Oral at bedtime PRN Insomnia  ondansetron Injectable 4 milliGRAM(s) IV Push every 6 hours PRN Nausea and/or Vomiting  oxyCODONE    IR 5 milliGRAM(s) Oral every 4 hours PRN Moderate Pain (4 - 6)  oxyCODONE    IR 10 milliGRAM(s) Oral every 4 hours PRN Severe Pain (7 - 10)  QUEtiapine 25 milliGRAM(s) Oral every 6 hours PRN anxiety      Vital Signs Last 24 Hrs  T(C): 36.4 (12 Dec 2019 13:11), Max: 37 (12 Dec 2019 04:40)  T(F): 97.6 (12 Dec 2019 13:11), Max: 98.6 (12 Dec 2019 04:40)  HR: 76 (12 Dec 2019 13:11) (71 - 88)  BP: 122/84 (12 Dec 2019 13:11) (119/75 - 146/88)  BP(mean): --  RR: 18 (12 Dec 2019 13:11) (18 - 18)  SpO2: 98% (12 Dec 2019 13:11) (94% - 99%)    PHYSICAL EXAM:  GENERAL: NAD, well-developed  HEAD:  Atraumatic, Normocephalic  EYES: EOMI, PERRLA, conjunctiva and sclera clear  NECK: Supple, No JVD Incision healing.  CHEST/LUNG: Clear to auscultation bilaterally; No wheeze  HEART: Regular rate and rhythm; No murmurs, rubs, or gallops  ABDOMEN: Soft, Nontender, Nondistended; Bowel sounds present  EXTREMITIES:  2+ Peripheral Pulses, No clubbing, cyanosis, or edema  PSYCH: AAOx3  NEUROLOGY: non-focal  SKIN: No rashes or lesions    LABS:                      RADIOLOGY & ADDITIONAL TESTS:    Imaging Personally Reviewed:    Consultant(s) Notes Reviewed:      Care Discussed with Consultants/Other Providers:

## 2019-12-12 NOTE — PROGRESS NOTE BEHAVIORAL HEALTH - CASE SUMMARY
49F, , domiciled with partner of 6 years, disabled, has an adult son who is living and a 2nd son who  of an opiate overdose 2.5 years ago, with PPH of mood disorder and borderline PD, multiple prior psychiatric admissions (last at [facility] in 2019, multiple prior suicide attempts by overdose, last attempted in , in tx at [facility] AOPD with Dr. Karen Garcia, with no active substance abuse, PMH significant for gastritis, radiculopathy and migraine headaches, who presented to [facility] ED with severe cervical stenosis worst at C4-6 with spinal signal change, had an anterior cervical discectomy and fusion on 19, subsequently received dexamethasone, psychiatry is consulted because patient is tearful, anxious and depressed.  Pt reports being psychiatrically stable for many months, recently graduated DBT at [facility] in Oct, was continuing monthly visits with her psychiatrists and additional group therapies.  States since hospitalization, she has been more anxious and tearful due to pain, less able to utilize her coping skills to manage emotional distress.  Sleeping poorly, feels it may be 2/2 steroids given in hospital.  Denies SI/HI.  Partner denies acute psychiatric safety concerns, but feels pt is not ready to go home, still having issues with pain management.  : pt feels less anxious today, slept better, but worried about having unmanaged pain at home or being sent home without clarifying pain regimen.  Plans to f/u with primary team.  Denies SI/HI.  Agree with fellow's assessment and plan as above.

## 2019-12-12 NOTE — PROGRESS NOTE BEHAVIORAL HEALTH - NSBHCONSULTFOLLOWAFTERCARE_PSY_A_CORE FT
Outpatient psychiatrist Dr. Garcia within a week of discharge (at [facility])

## 2019-12-12 NOTE — PROGRESS NOTE BEHAVIORAL HEALTH - SUMMARY
49F, , domiciled with partner of 6 years, disabled, has an adult son who is living and a 2nd son who  of an opiate overdose 2.5 years ago, with PPH of mood disorder and borderline PD, multiple prior psychiatric admissions (last at [facility] in 2019, multiple prior suicide attempts by overdose, last attempted in , in tx at [facility] AOPD with Dr. Karen Garcia, with no active substance abuse, PMH significant for gastritis, radiculopathy and migraine headaches, who presented to [facility] ED with severe cervical stenosis worst at C4-6 with spinal signal change, had an anterior cervical discectomy and fusion on 19, subsequently received dexamethasone, psychiatry was consulted because patient is tearful, anxious and depressed, following up today.  Patient reports feeling better and states she had better sleep last night. Presently denies any SI/HI/manic or psychotic symptoms. Based on evaluation, she is not an acutely elevated risk of self harm or harm to others and does not require psychiatric admission at this time. We recommend continuing currently prescribed medication and following up with outpatient psychiatrist DR. Garcia at [facility] following discharge.

## 2019-12-12 NOTE — PROGRESS NOTE BEHAVIORAL HEALTH - NSBHCONSULTRECOMMENDOTHER_PSY_A_CORE FT
Patient agrees to follow up with her outpatient psychiatrist Dr. Garcia within a week of discharge (earlier than scheduled appointment on Jan/09)  Please provide a week worth of Seroquel 25 mg po q 6 hr prn severe anxiety at discharge

## 2019-12-12 NOTE — PROGRESS NOTE BEHAVIORAL HEALTH - NSBHFUPINTERVALHXFT_PSY_A_CORE
Patient reports sleeping better last night and believes the increased dose of Trazodone helped her sleep. She also reports getting valium (ordered by primary team). Patient continues to report pain and states Tramadol does not help so the primary team gave her oxycodone. She denies any other psychiatric complains. Denies SI/HI. Denies hallucinations and lawrence symptoms. No delusions elicited. Pt has not used any PRN seroquel prescribed for anxiety yet.

## 2019-12-12 NOTE — PROGRESS NOTE ADULT - ASSESSMENT
49 f with     Cervical stenosis  - pain control  - steroids taper  - postop care    BS control while on steroids    HTN control    Depression and Anxiety  - continue Rx  - Psych follow.    Gastritis  - PPI    Medically stable.    d/w patient     Haresh Dubois MD pager [phone]

## 2019-12-12 NOTE — PROGRESS NOTE ADULT - REASON FOR ADMISSION
severe cervical stenosis

## 2019-12-12 NOTE — PROGRESS NOTE BEHAVIORAL HEALTH - RISK ASSESSMENT
Risk factors include multiple prior suicide attempt, insomnia, severe anxiety,, h/o BPPD, loss of adult son, documented h/o THC/BZD abuse, multiple psychiatric admissions, chronic pain.  Protective factors: Residential stability, relationship stability, supportive fiance and son, no current SI, help seeking, receptive to tx, no active substance abuse

## 2019-12-20 ENCOUNTER — APPOINTMENT (OUTPATIENT)
Dept: SPINE | Facility: CLINIC | Age: 49
End: 2019-12-20
Payer: COMMERCIAL

## 2019-12-20 VITALS
HEIGHT: 65 IN | HEART RATE: 113 BPM | DIASTOLIC BLOOD PRESSURE: 81 MMHG | TEMPERATURE: 98 F | SYSTOLIC BLOOD PRESSURE: 132 MMHG | OXYGEN SATURATION: 94 % | WEIGHT: 198 LBS | BODY MASS INDEX: 32.99 KG/M2

## 2019-12-20 PROCEDURE — 99024 POSTOP FOLLOW-UP VISIT: CPT

## 2019-12-20 NOTE — REASON FOR VISIT
[de-identified] : 12/8/2019 [de-identified] : 12 [de-identified] : 2 [de-identified] : C 4 C 5 anterior cervical diskectomy and fusion with anterior plating  [Other: _____] : [unfilled]

## 2019-12-20 NOTE — ASSESSMENT
[FreeTextEntry1] : Assess:\par Emotionally labile at visit reporting she will not harm herself\par S/P ACDF C 4 C 5 \par Weakness of her right grasp and pain on right arm \par Incision clean and dry    \par \par PLAN\par Contact the therapist for psych appointment and follow up \par Increase oxy to 10 mg every 6 hours and attempt to decrease use\par Side effects and use discussed  \par Valium 2 every 8 hrs renewed with use explained \par Keep incision clean and dry\par Follow up in two weeks with a cervical xray AP and lateral \par Begin slow and small ROM of neck side to side

## 2019-12-20 NOTE — HISTORY OF PRESENT ILLNESS
[FreeTextEntry1] : This is a 48 yo female with an underlying bipolar disorder who presented to the ED on December 6 , 2019 with neck pain and right arm radiuclar pain.  The pain was a 10/10 and she had imaging that revealed a C 4 C 5 herniated disc and under went a cervical disc fusion.  She had a psychotic episode from steroid use in the hospital which required psyche eval.  She had post op hoarseness  and   dysphagia.  \par \par Today she  is home and  has reported pain of her right arm.  the pain is an ache and associated tingling is reported.  She has minimal dysphagia that has almost resolved.  No neck pain at present.  her incision is healing well and flat with no drainage and steri strips intact. She is taking oxycodone every six hours, valium twice a day .

## 2019-12-20 NOTE — PHYSICAL EXAM
[General Appearance - Alert] : alert [General Appearance - In No Acute Distress] : in no acute distress [General Appearance - Well Nourished] : well nourished [General Appearance - Well Developed] : well developed [Clean] : clean [Dry] : dry [Healing Well] : healing well [Intact] : intact [No Drainage] : without drainage [Erythema] : not erythematous [Normal Skin] : normal [Warm] : not warm [Tender] : not tender [Oriented To Time, Place, And Person] : oriented to person, place, and time [Affect] : the affect was normal [Impaired Insight] : insight and judgment were intact [Person] : oriented to person [Place] : oriented to place [Time] : oriented to time [Short Term Intact] : short term memory intact [Remote Intact] : remote memory intact [Registration Intact] : recent registration memory intact [Cranial Nerves Oculomotor (III)] : extraocular motion intact [Sensation Tactile Decrease] : light touch was intact [Balance] : balance was intact [Limited Balance] : balance was intact [Coordination - Dysmetria Impaired Finger-to-Nose Bilateral] : not present [Tremor] : no tremor present [FreeTextEntry6] : right hand grasp weak 4/5 [Sclera] : the sclera and conjunctiva were normal [No Visual Abnormalities] : no visible abnormalities [Outer Ear] : the ears and nose were normal in appearance [Neck Appearance] : the appearance of the neck was normal [FreeTextEntry1] : limited ROm of neck  [Skin Color & Pigmentation] : normal skin color and pigmentation [] : no respiratory distress [Abnormal Walk] : normal gait

## 2019-12-20 NOTE — REVIEW OF SYSTEMS
[Arm Weakness] : arm weakness [Hand Weakness] :  hand weakness [Numbness] : numbness [Negative] : Heme/Lymph [de-identified] : right arm pain

## 2019-12-27 ENCOUNTER — OTHER (OUTPATIENT)
Age: 49
End: 2019-12-27

## 2019-12-31 ENCOUNTER — MEDICATION RENEWAL (OUTPATIENT)
Age: 49
End: 2019-12-31

## 2020-01-07 ENCOUNTER — EMERGENCY (EMERGENCY)
Facility: HOSPITAL | Age: 50
LOS: 1 days | Discharge: HOME | End: 2020-01-07
Attending: EMERGENCY MEDICINE
Payer: MEDICAID

## 2020-01-07 ENCOUNTER — APPOINTMENT (OUTPATIENT)
Dept: SPINE | Facility: CLINIC | Age: 50
End: 2020-01-07
Payer: COMMERCIAL

## 2020-01-07 VITALS
TEMPERATURE: 98 F | DIASTOLIC BLOOD PRESSURE: 78 MMHG | HEART RATE: 98 BPM | SYSTOLIC BLOOD PRESSURE: 149 MMHG | RESPIRATION RATE: 18 BRPM | OXYGEN SATURATION: 98 %

## 2020-01-07 VITALS
BODY MASS INDEX: 32.99 KG/M2 | TEMPERATURE: 98 F | SYSTOLIC BLOOD PRESSURE: 137 MMHG | DIASTOLIC BLOOD PRESSURE: 89 MMHG | HEART RATE: 105 BPM | OXYGEN SATURATION: 95 % | HEIGHT: 65 IN | WEIGHT: 198 LBS

## 2020-01-07 VITALS
HEIGHT: 64 IN | RESPIRATION RATE: 18 BRPM | WEIGHT: 108.03 LBS | TEMPERATURE: 99 F | OXYGEN SATURATION: 98 % | SYSTOLIC BLOOD PRESSURE: 137 MMHG | HEART RATE: 102 BPM | DIASTOLIC BLOOD PRESSURE: 86 MMHG

## 2020-01-07 DIAGNOSIS — Z98.51 TUBAL LIGATION STATUS: Chronic | ICD-10-CM

## 2020-01-07 DIAGNOSIS — Z98.890 OTHER SPECIFIED POSTPROCEDURAL STATES: Chronic | ICD-10-CM

## 2020-01-07 LAB
ALBUMIN SERPL ELPH-MCNC: 4.1 G/DL — SIGNIFICANT CHANGE UP (ref 3.3–5)
ALP SERPL-CCNC: 77 U/L — SIGNIFICANT CHANGE UP (ref 40–120)
ALT FLD-CCNC: 31 U/L — SIGNIFICANT CHANGE UP (ref 10–45)
ANION GAP SERPL CALC-SCNC: 13 MMOL/L — SIGNIFICANT CHANGE UP (ref 5–17)
AST SERPL-CCNC: 21 U/L — SIGNIFICANT CHANGE UP (ref 10–40)
BASOPHILS # BLD AUTO: 0.03 K/UL — SIGNIFICANT CHANGE UP (ref 0–0.2)
BASOPHILS NFR BLD AUTO: 0.3 % — SIGNIFICANT CHANGE UP (ref 0–2)
BILIRUB SERPL-MCNC: 0.3 MG/DL — SIGNIFICANT CHANGE UP (ref 0.2–1.2)
BUN SERPL-MCNC: 4 MG/DL — LOW (ref 7–23)
CALCIUM SERPL-MCNC: 9.1 MG/DL — SIGNIFICANT CHANGE UP (ref 8.4–10.5)
CHLORIDE SERPL-SCNC: 103 MMOL/L — SIGNIFICANT CHANGE UP (ref 96–108)
CO2 SERPL-SCNC: 22 MMOL/L — SIGNIFICANT CHANGE UP (ref 22–31)
CREAT SERPL-MCNC: 0.61 MG/DL — SIGNIFICANT CHANGE UP (ref 0.5–1.3)
CRP SERPL-MCNC: 0.32 MG/DL — SIGNIFICANT CHANGE UP (ref 0–0.4)
EOSINOPHIL # BLD AUTO: 0.09 K/UL — SIGNIFICANT CHANGE UP (ref 0–0.5)
EOSINOPHIL NFR BLD AUTO: 1 % — SIGNIFICANT CHANGE UP (ref 0–6)
ERYTHROCYTE [SEDIMENTATION RATE] IN BLOOD: 12 MM/HR — SIGNIFICANT CHANGE UP (ref 0–20)
GLUCOSE SERPL-MCNC: 102 MG/DL — HIGH (ref 70–99)
HCT VFR BLD CALC: 37.6 % — SIGNIFICANT CHANGE UP (ref 34.5–45)
HGB BLD-MCNC: 12.6 G/DL — SIGNIFICANT CHANGE UP (ref 11.5–15.5)
IMM GRANULOCYTES NFR BLD AUTO: 0.2 % — SIGNIFICANT CHANGE UP (ref 0–1.5)
LYMPHOCYTES # BLD AUTO: 1.72 K/UL — SIGNIFICANT CHANGE UP (ref 1–3.3)
LYMPHOCYTES # BLD AUTO: 18.5 % — SIGNIFICANT CHANGE UP (ref 13–44)
MCHC RBC-ENTMCNC: 31.8 PG — SIGNIFICANT CHANGE UP (ref 27–34)
MCHC RBC-ENTMCNC: 33.5 GM/DL — SIGNIFICANT CHANGE UP (ref 32–36)
MCV RBC AUTO: 94.9 FL — SIGNIFICANT CHANGE UP (ref 80–100)
MONOCYTES # BLD AUTO: 0.73 K/UL — SIGNIFICANT CHANGE UP (ref 0–0.9)
MONOCYTES NFR BLD AUTO: 7.8 % — SIGNIFICANT CHANGE UP (ref 2–14)
NEUTROPHILS # BLD AUTO: 6.73 K/UL — SIGNIFICANT CHANGE UP (ref 1.8–7.4)
NEUTROPHILS NFR BLD AUTO: 72.2 % — SIGNIFICANT CHANGE UP (ref 43–77)
NRBC # BLD: 0 /100 WBCS — SIGNIFICANT CHANGE UP (ref 0–0)
PLATELET # BLD AUTO: 465 K/UL — HIGH (ref 150–400)
POTASSIUM SERPL-MCNC: 3.7 MMOL/L — SIGNIFICANT CHANGE UP (ref 3.5–5.3)
POTASSIUM SERPL-SCNC: 3.7 MMOL/L — SIGNIFICANT CHANGE UP (ref 3.5–5.3)
PROT SERPL-MCNC: 6.8 G/DL — SIGNIFICANT CHANGE UP (ref 6–8.3)
RBC # BLD: 3.96 M/UL — SIGNIFICANT CHANGE UP (ref 3.8–5.2)
RBC # FLD: 12.2 % — SIGNIFICANT CHANGE UP (ref 10.3–14.5)
SODIUM SERPL-SCNC: 138 MMOL/L — SIGNIFICANT CHANGE UP (ref 135–145)
WBC # BLD: 9.32 K/UL — SIGNIFICANT CHANGE UP (ref 3.8–10.5)
WBC # FLD AUTO: 9.32 K/UL — SIGNIFICANT CHANGE UP (ref 3.8–10.5)

## 2020-01-07 PROCEDURE — 72126 CT NECK SPINE W/DYE: CPT

## 2020-01-07 PROCEDURE — 71046 X-RAY EXAM CHEST 2 VIEWS: CPT | Mod: 26

## 2020-01-07 PROCEDURE — 71046 X-RAY EXAM CHEST 2 VIEWS: CPT

## 2020-01-07 PROCEDURE — 71275 CT ANGIOGRAPHY CHEST: CPT

## 2020-01-07 PROCEDURE — 72126 CT NECK SPINE W/DYE: CPT | Mod: 26

## 2020-01-07 PROCEDURE — 96374 THER/PROPH/DIAG INJ IV PUSH: CPT | Mod: XU

## 2020-01-07 PROCEDURE — 87040 BLOOD CULTURE FOR BACTERIA: CPT

## 2020-01-07 PROCEDURE — 99284 EMERGENCY DEPT VISIT MOD MDM: CPT

## 2020-01-07 PROCEDURE — 85652 RBC SED RATE AUTOMATED: CPT

## 2020-01-07 PROCEDURE — 86140 C-REACTIVE PROTEIN: CPT

## 2020-01-07 PROCEDURE — 71275 CT ANGIOGRAPHY CHEST: CPT | Mod: 26

## 2020-01-07 PROCEDURE — 80053 COMPREHEN METABOLIC PANEL: CPT

## 2020-01-07 PROCEDURE — 85027 COMPLETE CBC AUTOMATED: CPT

## 2020-01-07 PROCEDURE — 99024 POSTOP FOLLOW-UP VISIT: CPT

## 2020-01-07 PROCEDURE — 99284 EMERGENCY DEPT VISIT MOD MDM: CPT | Mod: 25

## 2020-01-07 RX ORDER — ONDANSETRON 8 MG/1
4 TABLET, FILM COATED ORAL ONCE
Refills: 0 | Status: COMPLETED | OUTPATIENT
Start: 2020-01-07 | End: 2020-01-07

## 2020-01-07 RX ORDER — ONDANSETRON 8 MG/1
4 TABLET, FILM COATED ORAL ONCE
Refills: 0 | Status: DISCONTINUED | OUTPATIENT
Start: 2020-01-07 | End: 2020-01-07

## 2020-01-07 RX ADMIN — ONDANSETRON 4 MILLIGRAM(S): 8 TABLET, FILM COATED ORAL at 19:19

## 2020-01-07 RX ADMIN — Medication 40 MILLIGRAM(S): at 19:48

## 2020-01-07 NOTE — CONSULT NOTE ADULT - ASSESSMENT
Tuyet Foster  49F PMHx anxiety s/p C4-C5 ACDF w/ Dr. Mcnulty on 12/9 presents from office with complaints of neck pain, dysphagia, nausea, vomiting blood. Exam: AAOx3, non-toxic appearing, strength 5/5 bilaterally, endorses b/l UE radicular pain, not improved after surgery as well as posterior neck pain new since surgery.  - CT C-spine wo/w  - CBC, ESR, CRP  - Blood Cx

## 2020-01-07 NOTE — ED PROVIDER NOTE - NSFOLLOWUPINSTRUCTIONS_ED_ALL_ED_FT
Take prednisone daily for 5 days    REturn to the ER for any concerns such as uncontrolled pain or numbness or tingling    -- Please use 650mg Tylenol (also called acetaminophen) every 4 hours & 600mg Motrin (also called Advil or ibuprofen) every 6 hours as needed for pain/discomfort/swelling. You can get these without a prescription. Don't use more than 3500mg of Tylenol in any 24-hour period. Make sure your other prescription/over-the-counter medications don't contain any Tylenol so you don't take too much. If you have any stomach discomfort while taking Motrin, you can use TUMS or Pepcid or Zantac (these can all be bought without a prescription).     Take your other oral analgesic pain medications as ordered      follow up with Dr. Cloud in the next week

## 2020-01-07 NOTE — ED PROVIDER NOTE - CLINICAL SUMMARY MEDICAL DECISION MAKING FREE TEXT BOX
s/p spinal surgery 12/8- pt c/o  neck pain and  cough with hemoptysis- sent from Dr. Cloud office by ems cxr normal, CT c spine done and normal - steroids given- dc home

## 2020-01-07 NOTE — ED PROVIDER NOTE - ATTENDING CONTRIBUTION TO CARE
****ATTENDING**** 50yo f recent cervical spine surgery by Dr. Mcnulty 1 mo ago presents with neck pain. States her symptoms have been persistent since surgery and not controlled by her home pain meds. No fever, chills or numbness/weakness. Pt also reports sore throat w cough. Yesterday noticed blood tinged sputum. NO sob, palp.   On exam, Patient is awake,alert,oriented x 3. Patient is well appearing and in no acute distress. Neck steri strips to post surgical site. Patient's chest is clear to ausculation, +s1s2. Abdomen is soft nd/nt +BS. Extremity with no swelling or calf tenderness.   Pt saw Dr. Mcnulty who sent pt to the ER. She received morphine 5mg x 2.  CHeck labs, consult spine. pain control. ****ATTENDING**** 50yo f recent cervical spine surgery by Dr. Mcnulty 1 mo ago presents with neck pain. States her symptoms have been persistent since surgery and not controlled by her home pain meds. No fever, chills or numbness/weakness. Pt also reports sore throat w cough. Yesterday noticed blood tinged sputum. NO sob, palp.   On exam, Patient is awake,alert,oriented x 3. Patient is well appearing and in no acute distress. Neck steri strips to post surgical site, no discharge or erythema. Patient's chest is clear to ausculation, +s1s2. Abdomen is soft nd/nt +BS. Extremity with no swelling or calf tenderness.   Pt saw Dr. Mcnulty who sent pt to the ER. She received morphine 5mg x 2.  CHeck labs, consult spine. pain control.

## 2020-01-07 NOTE — ED ADULT NURSE NOTE - OBJECTIVE STATEMENT
2330 49 yr old WF brought to ER via ambulance on stretcher from surgeons office for further eval and tx of severe neck pain and coughing up blood streaked mucus. S/p surgery 12/8/2019 C4-C5 discectomy. Steristrips intact at left side of neck. denies numbness or weakness.  Staes taking pain meds every day since surg and pain is increasing. Also c/o sore throat, chills and night sweats

## 2020-01-07 NOTE — ED PROVIDER NOTE - PROGRESS NOTE DETAILS
sPOKE WITH NEUROSURG RESIDENT- ESR and cRP  with Bcx 2- Cr  with and without contrast c- spine Spoke with neuro surgery- pt ok to dc home with f/u- steroids given

## 2020-01-07 NOTE — ED ADULT NURSE REASSESSMENT NOTE - NS ED NURSE REASSESS COMMENT FT1
received report @ 14:30. Pt returned from x-ray.  Pt c/o of slight discomfort in her neck, MD made aware. Pt awaiting neurosurgery consult.

## 2020-01-07 NOTE — ED PROVIDER NOTE - PATIENT PORTAL LINK FT
You can access the FollowMyHealth Patient Portal offered by [facility] by registering at the following website: [email or web address]. By joining [employer]’s FollowMyHealth portal, you will also be able to view your health information using other applications (apps) compatible with our system.

## 2020-01-07 NOTE — ED PROVIDER NOTE - OBJECTIVE STATEMENT
48 yo female in room 1 brought into ER by EMS from Dr. Cloud's office. Pt states "I had spinal surgery on 12/8/19 for spinal fusion and discectomy.  I had a follow up visit today with the physician. I told him that I have had cough for the past few days and bring up yellow phlegm and yesterday I had one episode of coughing up blood.  I also have had a headache for the past few days too. My neck pain has not resolved either and I still have a lot of pain. Dr. Cloud sent me to the ER  to be evaluated.

## 2020-01-07 NOTE — CONSULT NOTE ADULT - SUBJECTIVE AND OBJECTIVE BOX
p ([id number])     HPI: 50 yo female brought into ER by EMS from Dr. Mcnulty's office. Pt states "I had spinal surgery on 12/8/19 for spinal fusion and discectomy.  I had a follow up visit today with the physician. I told him that I have had cough for the past few days and bring up yellow phlegm and yesterday I had one episode of coughing up blood.  I also have had a headache for the past few days too. My neck pain has not resolved either and I still have a lot of pain. Dr. Cloud sent me to the ER  to be evaluated.      Imaging:    Exam: AAOx3, non-toxic appearing, strength 5/5 bilaterally, endorses b/l UE radicular pain, not improved after surgery as well as posterior neck pain new since surgery.    --Anticoagulation:    =====================  PAST MEDICAL HISTORY   Borderline personality disorder  Gastritis  Tubal Ligation  Depression    PAST SURGICAL HISTORY   H/O cervical discectomy  H/O tubal ligation        MEDICATIONS:  Antibiotics:    Neuro:    Other:      SOCIAL HISTORY:   Occupation:   Marital Status:     FAMILY HISTORY:  No pertinent family history in first degree relatives      ROS: Negative except per HPI    LABS:                          12.6   9.32  )-----------( 465      ( 07 Jan 2020 14:29 )             37.6     01-07    138  |  103  |  4<L>  ----------------------------<  102<H>  3.7   |  22  |  0.61    Ca    9.1      07 Jan 2020 14:29    TPro  6.8  /  Alb  4.1  /  TBili  0.3  /  DBili  x   /  AST  21  /  ALT  31  /  AlkPhos  77  01-07

## 2020-01-07 NOTE — REASON FOR VISIT
[de-identified] : S/P C4-C5 anterior cervical diskectomy, C4-C5 insertion  of intervertebral cage for purpose of arthrodesis instrument and stabilization, and C4-C5 anterior plating and fusion.\par  [de-identified] : H/O Hemoptysis and difficulty swallowing\par Transferred to [facility]; No Resp distress

## 2020-01-09 ENCOUNTER — OTHER (OUTPATIENT)
Age: 50
End: 2020-01-09

## 2020-01-12 LAB
CULTURE RESULTS: SIGNIFICANT CHANGE UP
CULTURE RESULTS: SIGNIFICANT CHANGE UP
SPECIMEN SOURCE: SIGNIFICANT CHANGE UP
SPECIMEN SOURCE: SIGNIFICANT CHANGE UP

## 2020-02-27 NOTE — BEHAVIORAL HEALTH ASSESSMENT NOTE - NS ED BHA REVIEW OF ED CHART AVAILABLE IMAGING REVIEWED
Yes Azithromycin Counseling:  I discussed with the patient the risks of azithromycin including but not limited to GI upset, allergic reaction, drug rash, diarrhea, and yeast infections.

## 2020-03-11 NOTE — PROGRESS NOTE ADULT - I WAS PHYSICALLY PRESENT FOR THE KEY PORTIONS OF THE EVALUATION AND MANAGEMENT (E/M) SERVICE PROVIDED.  I AGREE WITH THE ABOVE HISTORY, PHYSICAL, AND PLAN WHICH I HAVE REVIEWED AND EDITED WHERE APPROPRIATE
Statement Selected Tazorac Pregnancy And Lactation Text: This medication is not safe during pregnancy. It is unknown if this medication is excreted in breast milk.

## 2020-05-11 PROBLEM — F60.3 BORDERLINE PERSONALITY DISORDER: Chronic | Status: ACTIVE | Noted: 2020-01-07

## 2020-05-13 ENCOUNTER — APPOINTMENT (OUTPATIENT)
Dept: SPINE | Facility: CLINIC | Age: 50
End: 2020-05-13
Payer: COMMERCIAL

## 2020-05-13 PROCEDURE — 99212 OFFICE O/P EST SF 10 MIN: CPT | Mod: 95

## 2020-05-14 NOTE — ASSESSMENT
[FreeTextEntry1] : S/P Cervical Fusion 12/2019\par Recurrent Upper extremity weakness\par \par Need New CT CERVICAL SPINE\par Follow up in office when completed\par \par

## 2020-05-14 NOTE — REASON FOR VISIT
[Follow-Up: _____] : a [unfilled] follow-up visit [FreeTextEntry1] : This is a TELEHEALTH VISIT Via Video Communication\par S/P C4-C5 anterior cervical diskectomy, C4-C5 insertion  of intervertebral cage for purpose of arthrodesis instrument and stabilization, and C4-C5 anterior plating and fusion on 12/8/19.\par \par She is now reporting progressive hand and arm weakness and increasing Neck pain\par She reports that her recurrent symptoms is impacting her life. She has not had any new Xray.\par

## 2020-05-14 NOTE — REVIEW OF SYSTEMS
[Arm Weakness] : arm weakness [Hand Weakness] :  hand weakness [Tingling] : tingling [Numbness] : numbness [Arthralgias] : arthralgias [Abnormal Sensation] : an abnormal sensation [Joint Pain] : joint pain [Limb Pain] : limb pain [Negative] : Cardiovascular [Joint Swelling] : no joint swelling [Joint Stiffness] : no joint stiffness

## 2020-05-14 NOTE — HISTORY OF PRESENT ILLNESS
[Home] : at home, [unfilled] , at the time of the visit. [Medical Office: ([facility])___] : at the medical office located in  [Self] : self [Patient] : the patient [FreeTextEntry4] : Richi Dsouza, NP

## 2020-06-02 ENCOUNTER — APPOINTMENT (OUTPATIENT)
Dept: SPINE | Facility: CLINIC | Age: 50
End: 2020-06-02
Payer: COMMERCIAL

## 2020-06-02 VITALS
HEIGHT: 60 IN | SYSTOLIC BLOOD PRESSURE: 139 MMHG | TEMPERATURE: 99 F | RESPIRATION RATE: 18 BRPM | BODY MASS INDEX: 38.87 KG/M2 | HEART RATE: 83 BPM | WEIGHT: 198 LBS | OXYGEN SATURATION: 98 % | DIASTOLIC BLOOD PRESSURE: 82 MMHG

## 2020-06-02 PROCEDURE — 99213 OFFICE O/P EST LOW 20 MIN: CPT

## 2020-06-02 RX ORDER — OXYCODONE 10 MG/1
10 TABLET ORAL EVERY 6 HOURS
Qty: 40 | Refills: 0 | Status: DISCONTINUED | COMMUNITY
Start: 2019-12-20 | End: 2020-06-02

## 2020-06-02 RX ORDER — DIAZEPAM 2 MG/1
2 TABLET ORAL
Qty: 28 | Refills: 0 | Status: DISCONTINUED | COMMUNITY
Start: 2019-12-20 | End: 2020-06-02

## 2020-06-02 RX ORDER — OXYCODONE 5 MG/1
5 TABLET ORAL EVERY 6 HOURS
Qty: 28 | Refills: 0 | Status: DISCONTINUED | COMMUNITY
Start: 2019-12-31 | End: 2020-06-02

## 2020-06-02 NOTE — REVIEW OF SYSTEMS
[Feeling Poorly] : feeling poorly [Feeling Tired] : feeling tired [Arm Weakness] : arm weakness (0) independent [Hand Weakness] :  hand weakness [Numbness] : numbness [Tingling] : tingling [Abnormal Sensation] : an abnormal sensation [Hypersensitivity] : hypersensitivity [Arthralgias] : arthralgias [Joint Pain] : joint pain [Joint Stiffness] : joint stiffness [Limb Pain] : limb pain [Negative] : Genitourinary [Joint Swelling] : no joint swelling [Limb Swelling] : no limb swelling

## 2020-06-02 NOTE — ASSESSMENT
[FreeTextEntry1] : S/P Anterior Cervical Fusion\par Need New MRI Cervical Spine\par \par Return to office when completed

## 2020-06-02 NOTE — REASON FOR VISIT
[Follow-Up: _____] : a [unfilled] follow-up visit [FreeTextEntry1] : \par S/P C4-C5 anterior cervical diskectomy, C4-C5 insertion  of intervertebral cage for purpose of arthrodesis instrument and stabilization, and C4-C5 anterior plating and fusion on 12/8/19.\par \par She is now reporting progressive hand and arm weakness and increasing Neck pain\par She reports that her NECK pain and Right Arm pain is getting progressively worse\par She reports progressive weakness in her hands\par \par \par \par \par \par \par \par She reports that her recurrent symptoms is impacting her life. She has not had any new Xray.\par

## 2020-06-02 NOTE — PHYSICAL EXAM
[General Appearance - Alert] : alert [General Appearance - In No Acute Distress] : in no acute distress [Oriented To Time, Place, And Person] : oriented to person, place, and time [Impaired Insight] : insight and judgment were intact [Cranial Nerves Optic (II)] : visual acuity intact bilaterally,  pupils equal round and reactive to light [Cranial Nerves Trigeminal (V)] : facial sensation intact symmetrically [Cranial Nerves Oculomotor (III)] : extraocular motion intact [Cranial Nerves Facial (VII)] : face symmetrical [Cranial Nerves Vestibulocochlear (VIII)] : hearing was intact bilaterally [Cranial Nerves Glossopharyngeal (IX)] : tongue and palate midline [Cranial Nerves Accessory (XI - Cranial And Spinal)] : head turning and shoulder shrug symmetric [Cranial Nerves Hypoglossal (XII)] : there was no tongue deviation with protrusion [Sensation Tactile Decrease] : light touch was intact [No Visual Abnormalities] : no visible abnormalities [Sclera] : the sclera and conjunctiva were normal [Outer Ear] : the ears and nose were normal in appearance [Neck Appearance] : the appearance of the neck was normal [Heart Rate And Rhythm] : heart rate was normal and rhythm regular [Skin Color & Pigmentation] : normal skin color and pigmentation [FreeTextEntry6] : Weakness RIGHT HAND  STRENGTH

## 2020-06-12 PROBLEM — M54.12 CERVICAL RADICULAR PAIN: Status: ACTIVE | Noted: 2020-06-02

## 2020-06-15 ENCOUNTER — APPOINTMENT (OUTPATIENT)
Dept: MRI IMAGING | Facility: CLINIC | Age: 50
End: 2020-06-15
Payer: MEDICAID

## 2020-06-15 ENCOUNTER — OUTPATIENT (OUTPATIENT)
Dept: OUTPATIENT SERVICES | Facility: HOSPITAL | Age: 50
LOS: 1 days | End: 2020-06-15
Payer: MEDICAID

## 2020-06-15 ENCOUNTER — RESULT REVIEW (OUTPATIENT)
Age: 50
End: 2020-06-15

## 2020-06-15 DIAGNOSIS — Z98.890 OTHER SPECIFIED POSTPROCEDURAL STATES: Chronic | ICD-10-CM

## 2020-06-15 DIAGNOSIS — M50.80 OTHER CERVICAL DISC DISORDERS, UNSPECIFIED CERVICAL REGION: ICD-10-CM

## 2020-06-15 DIAGNOSIS — R13.10 DYSPHAGIA, UNSPECIFIED: ICD-10-CM

## 2020-06-15 DIAGNOSIS — M48.02 SPINAL STENOSIS, CERVICAL REGION: ICD-10-CM

## 2020-06-15 DIAGNOSIS — Z98.51 TUBAL LIGATION STATUS: Chronic | ICD-10-CM

## 2020-06-15 PROCEDURE — 72141 MRI NECK SPINE W/O DYE: CPT | Mod: 26

## 2020-06-15 PROCEDURE — 72141 MRI NECK SPINE W/O DYE: CPT

## 2020-06-16 ENCOUNTER — APPOINTMENT (OUTPATIENT)
Dept: SPINE | Facility: CLINIC | Age: 50
End: 2020-06-16
Payer: COMMERCIAL

## 2020-06-16 VITALS
RESPIRATION RATE: 14 BRPM | BODY MASS INDEX: 38.87 KG/M2 | SYSTOLIC BLOOD PRESSURE: 133 MMHG | WEIGHT: 198 LBS | HEIGHT: 60 IN | DIASTOLIC BLOOD PRESSURE: 81 MMHG | TEMPERATURE: 98.5 F | OXYGEN SATURATION: 98 % | HEART RATE: 81 BPM

## 2020-06-16 DIAGNOSIS — M54.12 RADICULOPATHY, CERVICAL REGION: ICD-10-CM

## 2020-06-16 PROCEDURE — 99213 OFFICE O/P EST LOW 20 MIN: CPT

## 2020-06-16 NOTE — PHYSICAL EXAM
[General Appearance - In No Acute Distress] : in no acute distress [Oriented To Time, Place, And Person] : oriented to person, place, and time [General Appearance - Alert] : alert [Impaired Insight] : insight and judgment were intact [Cranial Nerves Optic (II)] : visual acuity intact bilaterally,  pupils equal round and reactive to light [Cranial Nerves Oculomotor (III)] : extraocular motion intact [Cranial Nerves Trigeminal (V)] : facial sensation intact symmetrically [Cranial Nerves Vestibulocochlear (VIII)] : hearing was intact bilaterally [Cranial Nerves Facial (VII)] : face symmetrical [Cranial Nerves Accessory (XI - Cranial And Spinal)] : head turning and shoulder shrug symmetric [Cranial Nerves Glossopharyngeal (IX)] : tongue and palate midline [Cranial Nerves Hypoglossal (XII)] : there was no tongue deviation with protrusion

## 2020-06-16 NOTE — REVIEW OF SYSTEMS
[Hand Weakness] :  hand weakness [Numbness] : numbness [Tingling] : tingling [Abnormal Sensation] : an abnormal sensation [Negative] : Heme/Lymph

## 2020-06-16 NOTE — REASON FOR VISIT
[Follow-Up: _____] : a [unfilled] follow-up visit [FreeTextEntry1] : S/P C4-C5 anterior cervical diskectomy, C4-C5 insertion  of intervertebral cage for purpose of arthrodesis instrument and stabilization, and C4-C5 anterior plating and fusion on 12/8/19\par   \par TODAY: Persistent RIGHT ARM PAIN and weakness\par Diminished sensation to RIGHT ARM\par Difficulty with handwriting\par \par \par Here for review of new MRI

## 2020-08-12 NOTE — ED ADULT NURSE NOTE - NS_BHTRGCALCULATEDSCORE_ED_A_ED_FT
2 Azithromycin Pregnancy And Lactation Text: This medication is considered safe during pregnancy and is also secreted in breast milk.

## 2020-10-07 NOTE — H&P ADULT - NSHPROSALLOTHERNEGRD_GEN_ALL_CORE
All other review of systems negative, except as noted in HPI Consent (Marginal Mandibular)/Introductory Paragraph: The rationale for Mohs was explained to the patient and consent was obtained. The risks, benefits and alternatives to therapy were discussed in detail. Specifically, the risks of damage to the marginal mandibular branch of the facial nerve, infection, scarring, bleeding, prolonged wound healing, incomplete removal, allergy to anesthesia, and recurrence were addressed. Prior to the procedure, the treatment site was clearly identified and confirmed by the patient. All components of Universal Protocol/PAUSE Rule completed.

## 2020-12-15 ENCOUNTER — APPOINTMENT (OUTPATIENT)
Dept: SPINE | Facility: CLINIC | Age: 50
End: 2020-12-15

## 2021-01-19 ENCOUNTER — APPOINTMENT (OUTPATIENT)
Dept: SPINE | Facility: CLINIC | Age: 51
End: 2021-01-19
Payer: COMMERCIAL

## 2021-01-19 VITALS
DIASTOLIC BLOOD PRESSURE: 82 MMHG | BODY MASS INDEX: 29.71 KG/M2 | TEMPERATURE: 99 F | OXYGEN SATURATION: 97 % | WEIGHT: 174 LBS | SYSTOLIC BLOOD PRESSURE: 121 MMHG | HEIGHT: 64 IN | HEART RATE: 82 BPM

## 2021-01-19 DIAGNOSIS — Z87.891 PERSONAL HISTORY OF NICOTINE DEPENDENCE: ICD-10-CM

## 2021-01-19 PROCEDURE — 99213 OFFICE O/P EST LOW 20 MIN: CPT

## 2021-01-19 PROCEDURE — 99072 ADDL SUPL MATRL&STAF TM PHE: CPT

## 2021-01-19 RX ORDER — TRAZODONE HYDROCHLORIDE 150 MG/1
150 TABLET ORAL
Refills: 0 | Status: ACTIVE | COMMUNITY

## 2021-01-19 RX ORDER — DULOXETINE HYDROCHLORIDE 60 MG/1
60 CAPSULE, DELAYED RELEASE ORAL
Refills: 0 | Status: ACTIVE | COMMUNITY

## 2021-01-19 RX ORDER — DULOXETINE HYDROCHLORIDE 60 MG/1
60 CAPSULE, DELAYED RELEASE ORAL
Refills: 0 | Status: DISCONTINUED | COMMUNITY
End: 2021-01-19

## 2021-01-19 RX ORDER — PREGABALIN 50 MG/1
50 CAPSULE ORAL
Qty: 60 | Refills: 0 | Status: DISCONTINUED | COMMUNITY
Start: 2020-06-02 | End: 2021-01-19

## 2021-01-19 NOTE — REASON FOR VISIT
[Follow-Up: _____] : a [unfilled] follow-up visit [Other: _____] : [unfilled] [FreeTextEntry1] : 50 year old female with  long standing history of cervical right sided arm radiculopathy.  The  pain is progressing and severe.   She had a ACDF C 4 C 5 13 months ago.  She has a cervical xrays that shows good alignment and construct.     She does not attend PT.

## 2021-01-19 NOTE — ASSESSMENT
[FreeTextEntry1] : Unrelenting cerivcal radiculopathy\par Progressing right arm pain\par Cervical xrays show good alignment and construct\par \par \par PLAN:\par MRI  of  cervical spine\par CT scan of  cervical spine \par follow up in one week\par No heavy lifting

## 2021-02-09 ENCOUNTER — APPOINTMENT (OUTPATIENT)
Dept: SPINE | Facility: CLINIC | Age: 51
End: 2021-02-09
Payer: COMMERCIAL

## 2021-02-09 VITALS
OXYGEN SATURATION: 96 % | HEIGHT: 64 IN | BODY MASS INDEX: 29.71 KG/M2 | TEMPERATURE: 97.6 F | DIASTOLIC BLOOD PRESSURE: 82 MMHG | HEART RATE: 82 BPM | WEIGHT: 174 LBS | SYSTOLIC BLOOD PRESSURE: 129 MMHG

## 2021-02-09 DIAGNOSIS — M48.02 SPINAL STENOSIS, CERVICAL REGION: ICD-10-CM

## 2021-02-09 DIAGNOSIS — R27.8 OTHER LACK OF COORDINATION: ICD-10-CM

## 2021-02-09 DIAGNOSIS — R29.898 OTHER SYMPTOMS AND SIGNS INVOLVING THE MUSCULOSKELETAL SYSTEM: ICD-10-CM

## 2021-02-09 DIAGNOSIS — M50.90 CERVICAL DISC DISORDER, UNSPECIFIED, UNSPECIFIED CERVICAL REGION: ICD-10-CM

## 2021-02-09 PROCEDURE — 99213 OFFICE O/P EST LOW 20 MIN: CPT

## 2021-02-09 PROCEDURE — 99072 ADDL SUPL MATRL&STAF TM PHE: CPT

## 2021-02-11 NOTE — REVIEW OF SYSTEMS
[Leg Weakness] : leg weakness [Poor Coordination] : poor coordination [Numbness] : numbness [Tingling] : tingling [Abnormal Sensation] : an abnormal sensation [Hypersensitivity] : hypersensitivity [Migraine Headache] : migraine headaches [As Noted in HPI] : as noted in HPI [Sleep Disturbances] : sleep disturbances [Anxiety] : anxiety [Depression] : depression [Arthralgias] : arthralgias [Joint Pain] : joint pain [Joint Swelling] : no joint swelling [Joint Stiffness] : joint stiffness [Limb Pain] : limb pain [Negative] : Heme/Lymph

## 2021-02-11 NOTE — PHYSICAL EXAM
[General Appearance - Alert] : alert [General Appearance - In No Acute Distress] : in no acute distress [Oriented To Time, Place, And Person] : oriented to person, place, and time [Impaired Insight] : insight and judgment were intact [Cranial Nerves Optic (II)] : visual acuity intact bilaterally,  pupils equal round and reactive to light [Cranial Nerves Oculomotor (III)] : extraocular motion intact [Cranial Nerves Trigeminal (V)] : facial sensation intact symmetrically [Cranial Nerves Facial (VII)] : face symmetrical [Cranial Nerves Vestibulocochlear (VIII)] : hearing was intact bilaterally [Cranial Nerves Glossopharyngeal (IX)] : tongue and palate midline [Cranial Nerves Accessory (XI - Cranial And Spinal)] : head turning and shoulder shrug symmetric [Cranial Nerves Hypoglossal (XII)] : there was no tongue deviation with protrusion [FreeTextEntry6] : Weakness in  strength [Neck Appearance] : the appearance of the neck was normal [] : no respiratory distress [Abdomen Soft] : soft [FreeTextEntry1] : Sensory ataxia; Intermittent gait instability

## 2021-02-11 NOTE — REASON FOR VISIT
[Follow-Up: _____] : a [unfilled] follow-up visit [FreeTextEntry1] : \par \par Today she is experiencing issues related to neurological progression.\par Weakness in upper extremity over the past several months.\par Weakness in  and dropping things. Weakness in legs and difficulty walking.\par Bladder issue Neck pain. Severe worsening dysphagia.\par She now reports that she is experiencing periods of intermittent urinary urgency and incontinence.

## 2021-02-11 NOTE — DATA REVIEWED
[de-identified] : Cervical spine [de-identified] : Cervical xray 1/14/21 [facility]

## 2021-02-11 NOTE — ASSESSMENT
[FreeTextEntry1] : 50 Year old Woman S/P Prior ACDF 2019\par presenting with progressive neurological deficits\par \par CT MYELOGRAM Cervical and Thoracic Spine to assess for LEvel of Compression and most severe Spinal Stenosis. Thoracic Spine recommended to assess for Thoracic Compression due to increasing progressive gait difficulty.\par \par BUN/CREAT labs PRE- CT MYELOGRAM\par Follow up in the office to finalize surgical plan after completion

## 2021-03-03 LAB
BUN SERPL-MCNC: 6 MG/DL
CREAT SERPL-MCNC: 0.76 MG/DL

## 2021-03-08 ENCOUNTER — APPOINTMENT (OUTPATIENT)
Dept: RADIOLOGY | Facility: HOSPITAL | Age: 51
End: 2021-03-08

## 2021-03-08 ENCOUNTER — OUTPATIENT (OUTPATIENT)
Dept: OUTPATIENT SERVICES | Facility: HOSPITAL | Age: 51
LOS: 1 days | End: 2021-03-08
Payer: MEDICAID

## 2021-03-08 DIAGNOSIS — Z98.51 TUBAL LIGATION STATUS: Chronic | ICD-10-CM

## 2021-03-08 DIAGNOSIS — Z11.52 ENCOUNTER FOR SCREENING FOR COVID-19: ICD-10-CM

## 2021-03-08 DIAGNOSIS — Z98.890 OTHER SPECIFIED POSTPROCEDURAL STATES: Chronic | ICD-10-CM

## 2021-03-08 LAB — SARS-COV-2 RNA SPEC QL NAA+PROBE: SIGNIFICANT CHANGE UP

## 2021-03-08 PROCEDURE — U0003: CPT

## 2021-03-08 PROCEDURE — C9803: CPT

## 2021-03-08 PROCEDURE — U0005: CPT

## 2021-03-09 ENCOUNTER — NON-APPOINTMENT (OUTPATIENT)
Age: 51
End: 2021-03-09

## 2021-03-09 ENCOUNTER — APPOINTMENT (OUTPATIENT)
Dept: SPINE | Facility: CLINIC | Age: 51
End: 2021-03-09

## 2021-03-10 ENCOUNTER — RESULT REVIEW (OUTPATIENT)
Age: 51
End: 2021-03-10

## 2021-03-10 ENCOUNTER — APPOINTMENT (OUTPATIENT)
Dept: RADIOLOGY | Facility: HOSPITAL | Age: 51
End: 2021-03-10
Payer: COMMERCIAL

## 2021-03-10 ENCOUNTER — OUTPATIENT (OUTPATIENT)
Dept: OUTPATIENT SERVICES | Facility: HOSPITAL | Age: 51
LOS: 1 days | End: 2021-03-10
Payer: MEDICAID

## 2021-03-10 DIAGNOSIS — Z98.890 OTHER SPECIFIED POSTPROCEDURAL STATES: Chronic | ICD-10-CM

## 2021-03-10 DIAGNOSIS — R13.10 DYSPHAGIA, UNSPECIFIED: ICD-10-CM

## 2021-03-10 DIAGNOSIS — Z98.51 TUBAL LIGATION STATUS: Chronic | ICD-10-CM

## 2021-03-10 PROCEDURE — 62305 MYELOGRAPHY LUMBAR INJECTION: CPT

## 2021-03-10 PROCEDURE — 72125 CT NECK SPINE W/O DYE: CPT

## 2021-03-10 PROCEDURE — 72125 CT NECK SPINE W/O DYE: CPT | Mod: 26

## 2021-03-10 PROCEDURE — 72128 CT CHEST SPINE W/O DYE: CPT

## 2021-03-10 PROCEDURE — 72128 CT CHEST SPINE W/O DYE: CPT | Mod: 26

## 2021-07-29 NOTE — ED ADULT NURSE NOTE - CAS TRG GEN SKIN CONDITION
Detail Level: Detailed Quality 431: Preventive Care And Screening: Unhealthy Alcohol Use - Screening: Patient screened for unhealthy alcohol use using a single question and scores less than 2 times per year Dry/Warm Quality 110: Preventive Care And Screening: Influenza Immunization: Influenza Immunization Administered during Influenza season Quality 226: Preventive Care And Screening: Tobacco Use: Screening And Cessation Intervention: Patient screened for tobacco use and is an ex/non-smoker

## 2021-08-17 ENCOUNTER — APPOINTMENT (OUTPATIENT)
Dept: OBGYN | Facility: CLINIC | Age: 51
End: 2021-08-17
Payer: MEDICAID

## 2021-08-17 VITALS
HEIGHT: 64 IN | OXYGEN SATURATION: 97 % | WEIGHT: 169 LBS | SYSTOLIC BLOOD PRESSURE: 133 MMHG | TEMPERATURE: 97.7 F | BODY MASS INDEX: 28.85 KG/M2 | DIASTOLIC BLOOD PRESSURE: 80 MMHG | HEART RATE: 85 BPM

## 2021-08-17 DIAGNOSIS — N92.0 EXCESSIVE AND FREQUENT MENSTRUATION WITH REGULAR CYCLE: ICD-10-CM

## 2021-08-17 DIAGNOSIS — R23.2 FLUSHING: ICD-10-CM

## 2021-08-17 DIAGNOSIS — Z86.59 PERSONAL HISTORY OF OTHER MENTAL AND BEHAVIORAL DISORDERS: ICD-10-CM

## 2021-08-17 PROCEDURE — 99204 OFFICE O/P NEW MOD 45 MIN: CPT

## 2021-08-17 RX ORDER — GABAPENTIN 300 MG/1
300 CAPSULE ORAL
Qty: 90 | Refills: 1 | Status: COMPLETED | COMMUNITY
Start: 2021-02-09 | End: 2021-08-17

## 2021-08-17 NOTE — HISTORY OF PRESENT ILLNESS
[Abnormal Quantity] : abnormal quantity [Abnormal Duration] : abnormal duration [___ Months] : [unfilled] months [Moderate Bleeding] : moderate bleeding [Pain] : pain [Menarche Age: ____] : age at menarche was [unfilled] [FreeTextEntry1] : 07/20/2021 [Currently Active] : currently active [Men] : men [Vaginal] : vaginal [No] : No

## 2021-08-17 NOTE — PHYSICAL EXAM
[Appropriately responsive] : appropriately responsive [Alert] : alert [No Acute Distress] : no acute distress [No Lymphadenopathy] : no lymphadenopathy [Regular Rate Rhythm] : regular rate rhythm [No Murmurs] : no murmurs [Clear to Auscultation B/L] : clear to auscultation bilaterally [Soft] : soft [Non-tender] : non-tender [Non-distended] : non-distended [No HSM] : No HSM [No Lesions] : no lesions [No Mass] : no mass [Oriented x3] : oriented x3 [Examination Of The Breasts] : a normal appearance [No Masses] : no breast masses were palpable [Normal] : normal [Retroversion] : retroverted [Uterine Adnexae] : normal

## 2021-08-18 LAB
APPEARANCE: CLEAR
BACTERIA: NEGATIVE
BILIRUBIN URINE: NEGATIVE
BLOOD URINE: NEGATIVE
C TRACH RRNA SPEC QL NAA+PROBE: NOT DETECTED
COLOR: YELLOW
GLUCOSE QUALITATIVE U: NEGATIVE
HPV HIGH+LOW RISK DNA PNL CVX: NOT DETECTED
HYALINE CASTS: 1 /LPF
KETONES URINE: NEGATIVE
LEUKOCYTE ESTERASE URINE: NEGATIVE
MICROSCOPIC-UA: NORMAL
N GONORRHOEA RRNA SPEC QL NAA+PROBE: NOT DETECTED
NITRITE URINE: NEGATIVE
PH URINE: 6
PROTEIN URINE: NEGATIVE
RED BLOOD CELLS URINE: 2 /HPF
SOURCE TP AMPLIFICATION: NORMAL
SPECIFIC GRAVITY URINE: 1.02
SQUAMOUS EPITHELIAL CELLS: 4 /HPF
UROBILINOGEN URINE: NORMAL
WHITE BLOOD CELLS URINE: 1 /HPF

## 2021-08-19 LAB — BACTERIA UR CULT: NORMAL

## 2021-08-22 LAB — CYTOLOGY CVX/VAG DOC THIN PREP: NORMAL

## 2021-11-11 PROCEDURE — 90832 PSYTX W PT 30 MINUTES: CPT

## 2022-04-15 NOTE — ED BEHAVIORAL HEALTH ASSESSMENT NOTE - PSYCHIATRIC ISSUES AND PLAN (INCLUDE STANDING AND PRN MEDICATION)
Clofazimine Pregnancy And Lactation Text: This medication is Pregnancy Category C and isn't considered safe during pregnancy. It is excreted in breast milk. Start Viibryd 10mg daily. D/c xanax and replace with Klonopin 0.5mg BID. Ativan 2mg PO/IM q6h PRN severe agitation. Melatonin 3mg PRN insomnia Start Viibryd 10mg daily (needs to be ordered once transfer to [facility]). D/c xanax and replace with Klonopin 0.5mg BID. Ativan 2mg PO/IM q6h PRN severe agitation. Melatonin 3mg PRN insomnia

## 2022-05-24 NOTE — ED BEHAVIORAL HEALTH ASSESSMENT NOTE - PERCEPTIONS
no chills/no dizziness/no fever/no nausea/no pain/no tingling/no vomiting/no weakness
No abnormalities

## 2022-07-08 NOTE — ED PROVIDER NOTE - CADM POA CENTRAL LINE
+FM, some mild ctx. IOL 7/19, cook 7/18      - U=D. LMP for dating: 10/5/21 -> ALISSA=7/5/22  - late [facility] @ 22wks  - Anemia: hgb=10.8 > iron BID; (4/7) hgb=10.6, ferritin=17, Fe Sat=14%  - Panorama (XX) low risk. Horizon 27 neg. AFP low risk   - US (3/10/22) 22+2. Post placenta. Nl morph. - US (6/10/22) 36+4 @ 35+3. 2882gn (69%). BRADY=12.67. Vtx.  -IOL (7/19/22).  AMPARO (7/18) No

## 2022-08-16 NOTE — PROGRESS NOTE ADULT - SUBJECTIVE AND OBJECTIVE BOX
Subjective: Patient seen and examined. No new events except as noted.     SUBJECTIVE/ROS:  No chest pain, dyspnea, palpitation, or dizziness.       MEDICATIONS:  MEDICATIONS  (STANDING):  ALPRAZolam 0.25 milliGRAM(s) Oral once  calcium gluconate IVPB 1 Gram(s) IV Intermittent once  ceFAZolin   IVPB 2000 milliGRAM(s) IV Intermittent every 8 hours  dexAMETHasone  Injectable 4 milliGRAM(s) IV Push every 6 hours  dextrose 5%. 1000 milliLiter(s) (50 mL/Hr) IV Continuous <Continuous>  dextrose 50% Injectable 12.5 Gram(s) IV Push once  dextrose 50% Injectable 25 Gram(s) IV Push once  dextrose 50% Injectable 25 Gram(s) IV Push once  famotidine    Tablet 20 milliGRAM(s) Oral two times a day  insulin lispro (HumaLOG) corrective regimen sliding scale   SubCutaneous three times a day before meals  insulin lispro (HumaLOG) corrective regimen sliding scale   SubCutaneous at bedtime  polyethylene glycol 3350 17 Gram(s) Oral daily  sodium chloride 0.9% lock flush 3 milliLiter(s) IV Push every 8 hours  sodium chloride 0.9%. 1000 milliLiter(s) (75 mL/Hr) IV Continuous <Continuous>  traZODone 100 milliGRAM(s) Oral at bedtime      PHYSICAL EXAM:  T(C): 36.7 (12-09-19 @ 07:59), Max: 37.1 (12-08-19 @ 11:34)  HR: 88 (12-09-19 @ 07:59) (59 - 91)  BP: 120/70 (12-09-19 @ 07:59) (111/72 - 144/85)  RR: 18 (12-09-19 @ 07:59) (15 - 18)  SpO2: 99% (12-09-19 @ 07:59) (94% - 100%)  Wt(kg): --  I&O's Summary    08 Dec 2019 07:01  -  09 Dec 2019 07:00  --------------------------------------------------------  IN: 745 mL / OUT: 300 mL / NET: 445 mL      Height (cm): 162.56 (12-08 @ 15:56)  Weight (kg): 90.7 (12-08 @ 15:56)  BMI (kg/m2): 34.3 (12-08 @ 15:56)  BSA (m2): 1.96 (12-08 @ 15:56)      JVP: Normal  Neck: supple  Lung: clear   CV: S1 S2 , Murmur:  Abd: soft  Ext: No edema  neuro: Awake / alert  Psych: flat affect  Skin: normal``    LABS/DATA:    CARDIAC MARKERS:                                12.4   22.51 )-----------( 348      ( 09 Dec 2019 07:43 )             38.6     12-09    136  |  103  |  9   ----------------------------<  141<H>  4.5   |  24  |  0.53    Ca    8.2<L>      09 Dec 2019 06:34  Phos  2.6     12-09  Mg     2.2     12-09      proBNP:   Lipid Profile:   HgA1c:   TSH:     TELE:  EKG: Xolair Counseling:  Patient informed of potential adverse effects including but not limited to fever, muscle aches, rash and allergic reactions.  The patient verbalized understanding of the proper use and possible adverse effects of Xolair.  All of the patient's questions and concerns were addressed.

## 2022-08-26 NOTE — PATIENT PROFILE ADULT - HAVE YOU EXPERIENCED A TRAUMATIC EVENT?
Peripheral Block    Patient location during procedure: holding area  Start time: 8/26/2022 7:04 AM  Reason for block: at surgeon's request and post-op pain management  Staffing  Performed: Anesthesiologist   Anesthesiologist: Silvio Ricardo DO  Preanesthetic Checklist  Completed: patient identified, IV checked, site marked, risks and benefits discussed, surgical consent, monitors and equipment checked, pre-op evaluation and timeout performed  Peripheral Block  Patient position: supine  Prep: ChloraPrep  Patient monitoring: heart rate, cardiac monitor, continuous pulse ox and frequent blood pressure checks  Block type: femoral  Laterality: right  Injection technique: single-shot  Procedures: ultrasound guided, Ultrasound guidance required for the procedure to increase accuracy and safety of medication placement and decrease risk of complications   and nerve stimulator  Ultrasound permanent image savedropivacaine (NAROPIN) 0 5 % - Perineural   20 mL - 8/26/2022 7:04:00 AM  midazolam (VERSED) 2 mg/2 mL - Intravenous   4 mg - 8/26/2022 7:04:00 AM  fentaNYL 50 mcg/mL - Intravenous   100 mcg - 8/26/2022 7:04:00 AM  Needle  Needle type: Stimuplex   Needle gauge: 22 G  Needle length: 5 cm  Needle localization: ultrasound guidance and nerve stimulator  Assessment  Injection assessment: incremental injection, local visualized surrounding nerve on ultrasound, negative aspiration for heme and no paresthesia on injection  Paresthesia pain: none  Heart rate change: no  Slow fractionated injection: yes  Post-procedure:  site cleaned  patient tolerated the procedure well with no immediate complications
no

## 2022-10-08 NOTE — ED PROVIDER NOTE - CPE EDP NEURO NORM
Pt at 94% on room air at rest.  Pt ambulated around room doing adl's for 5 min .  During those 5 min pt O2 dropped 92% and maintained above that.    
normal...

## 2023-06-15 NOTE — ED PROVIDER NOTE - NS ED MD DISPO SPECIAL CONSIDERATION1
Pt has history AF, rate &/or rhythm therapy has been considered, Anticoagulation as listed and as appropriate. Atrial fibrillation is very common as we age. Fast ventricular rate or simply rate control strategy leads to poor quality of life with chronic fatigue, dyspnea and lack of endurance. Chadsvasc score has been looked into. Cardiology if managing QT prolongation as appropriate. If palpitations or SOB happens then please notify us. QOL can be maintained as good as we can with proper strategic therapy for AF.Chances of embolism or embolic events remain high without anticoagulation therapy.    None

## 2023-10-15 ENCOUNTER — EMERGENCY (EMERGENCY)
Facility: HOSPITAL | Age: 53
LOS: 1 days | Discharge: HOME | End: 2023-10-15
Attending: EMERGENCY MEDICINE | Admitting: EMERGENCY MEDICINE
Payer: MEDICAID

## 2023-10-15 VITALS
SYSTOLIC BLOOD PRESSURE: 183 MMHG | DIASTOLIC BLOOD PRESSURE: 101 MMHG | OXYGEN SATURATION: 100 % | HEART RATE: 130 BPM | RESPIRATION RATE: 18 BRPM | TEMPERATURE: 99 F

## 2023-10-15 VITALS — HEART RATE: 94 BPM

## 2023-10-15 DIAGNOSIS — Z98.51 TUBAL LIGATION STATUS: Chronic | ICD-10-CM

## 2023-10-15 DIAGNOSIS — Z98.890 OTHER SPECIFIED POSTPROCEDURAL STATES: Chronic | ICD-10-CM

## 2023-10-15 LAB
ALBUMIN SERPL ELPH-MCNC: 4.6 G/DL — SIGNIFICANT CHANGE UP (ref 3.3–5)
ALP SERPL-CCNC: 65 U/L — SIGNIFICANT CHANGE UP (ref 40–120)
ALT FLD-CCNC: 17 U/L — SIGNIFICANT CHANGE UP (ref 4–33)
ANION GAP SERPL CALC-SCNC: 12 MMOL/L — SIGNIFICANT CHANGE UP (ref 7–14)
APAP SERPL-MCNC: <10 UG/ML — LOW (ref 15–25)
APPEARANCE UR: CLEAR — SIGNIFICANT CHANGE UP
AST SERPL-CCNC: 18 U/L — SIGNIFICANT CHANGE UP (ref 4–32)
BACTERIA # UR AUTO: NEGATIVE /HPF — SIGNIFICANT CHANGE UP
BASOPHILS # BLD AUTO: 0.04 K/UL — SIGNIFICANT CHANGE UP (ref 0–0.2)
BASOPHILS NFR BLD AUTO: 0.5 % — SIGNIFICANT CHANGE UP (ref 0–2)
BILIRUB SERPL-MCNC: 0.3 MG/DL — SIGNIFICANT CHANGE UP (ref 0.2–1.2)
BILIRUB UR-MCNC: NEGATIVE — SIGNIFICANT CHANGE UP
BUN SERPL-MCNC: 8 MG/DL — SIGNIFICANT CHANGE UP (ref 7–23)
CALCIUM SERPL-MCNC: 10.1 MG/DL — SIGNIFICANT CHANGE UP (ref 8.4–10.5)
CAST: 0 /LPF — SIGNIFICANT CHANGE UP (ref 0–4)
CHLORIDE SERPL-SCNC: 104 MMOL/L — SIGNIFICANT CHANGE UP (ref 98–107)
CO2 SERPL-SCNC: 27 MMOL/L — SIGNIFICANT CHANGE UP (ref 22–31)
COLOR SPEC: YELLOW — SIGNIFICANT CHANGE UP
CREAT SERPL-MCNC: 0.63 MG/DL — SIGNIFICANT CHANGE UP (ref 0.5–1.3)
DIFF PNL FLD: ABNORMAL
EGFR: 105 ML/MIN/1.73M2 — SIGNIFICANT CHANGE UP
EOSINOPHIL # BLD AUTO: 0.08 K/UL — SIGNIFICANT CHANGE UP (ref 0–0.5)
EOSINOPHIL NFR BLD AUTO: 0.9 % — SIGNIFICANT CHANGE UP (ref 0–6)
ETHANOL SERPL-MCNC: <10 MG/DL — SIGNIFICANT CHANGE UP
GLUCOSE SERPL-MCNC: 95 MG/DL — SIGNIFICANT CHANGE UP (ref 70–99)
GLUCOSE UR QL: NEGATIVE MG/DL — SIGNIFICANT CHANGE UP
HCG SERPL-ACNC: 2 MIU/ML — SIGNIFICANT CHANGE UP
HCT VFR BLD CALC: 43 % — SIGNIFICANT CHANGE UP (ref 34.5–45)
HGB BLD-MCNC: 14 G/DL — SIGNIFICANT CHANGE UP (ref 11.5–15.5)
IANC: 5.81 K/UL — SIGNIFICANT CHANGE UP (ref 1.8–7.4)
IMM GRANULOCYTES NFR BLD AUTO: 0.2 % — SIGNIFICANT CHANGE UP (ref 0–0.9)
KETONES UR-MCNC: NEGATIVE MG/DL — SIGNIFICANT CHANGE UP
LEUKOCYTE ESTERASE UR-ACNC: NEGATIVE — SIGNIFICANT CHANGE UP
LYMPHOCYTES # BLD AUTO: 1.96 K/UL — SIGNIFICANT CHANGE UP (ref 1–3.3)
LYMPHOCYTES # BLD AUTO: 22.9 % — SIGNIFICANT CHANGE UP (ref 13–44)
MCHC RBC-ENTMCNC: 32 PG — SIGNIFICANT CHANGE UP (ref 27–34)
MCHC RBC-ENTMCNC: 32.6 GM/DL — SIGNIFICANT CHANGE UP (ref 32–36)
MCV RBC AUTO: 98.2 FL — SIGNIFICANT CHANGE UP (ref 80–100)
MONOCYTES # BLD AUTO: 0.66 K/UL — SIGNIFICANT CHANGE UP (ref 0–0.9)
MONOCYTES NFR BLD AUTO: 7.7 % — SIGNIFICANT CHANGE UP (ref 2–14)
NEUTROPHILS # BLD AUTO: 5.81 K/UL — SIGNIFICANT CHANGE UP (ref 1.8–7.4)
NEUTROPHILS NFR BLD AUTO: 67.8 % — SIGNIFICANT CHANGE UP (ref 43–77)
NITRITE UR-MCNC: NEGATIVE — SIGNIFICANT CHANGE UP
NRBC # BLD: 0 /100 WBCS — SIGNIFICANT CHANGE UP (ref 0–0)
NRBC # FLD: 0 K/UL — SIGNIFICANT CHANGE UP (ref 0–0)
PH UR: 6 — SIGNIFICANT CHANGE UP (ref 5–8)
PLATELET # BLD AUTO: 370 K/UL — SIGNIFICANT CHANGE UP (ref 150–400)
POTASSIUM SERPL-MCNC: 4.1 MMOL/L — SIGNIFICANT CHANGE UP (ref 3.5–5.3)
POTASSIUM SERPL-SCNC: 4.1 MMOL/L — SIGNIFICANT CHANGE UP (ref 3.5–5.3)
PROT SERPL-MCNC: 6.8 G/DL — SIGNIFICANT CHANGE UP (ref 6–8.3)
PROT UR-MCNC: NEGATIVE MG/DL — SIGNIFICANT CHANGE UP
RBC # BLD: 4.38 M/UL — SIGNIFICANT CHANGE UP (ref 3.8–5.2)
RBC # FLD: 12 % — SIGNIFICANT CHANGE UP (ref 10.3–14.5)
RBC CASTS # UR COMP ASSIST: 1 /HPF — SIGNIFICANT CHANGE UP (ref 0–4)
SALICYLATES SERPL-MCNC: <0.3 MG/DL — LOW (ref 15–30)
SARS-COV-2 RNA SPEC QL NAA+PROBE: SIGNIFICANT CHANGE UP
SODIUM SERPL-SCNC: 143 MMOL/L — SIGNIFICANT CHANGE UP (ref 135–145)
SP GR SPEC: 1.01 — SIGNIFICANT CHANGE UP (ref 1–1.03)
SQUAMOUS # UR AUTO: 5 /HPF — SIGNIFICANT CHANGE UP (ref 0–5)
UROBILINOGEN FLD QL: 0.2 MG/DL — SIGNIFICANT CHANGE UP (ref 0.2–1)
WBC # BLD: 8.57 K/UL — SIGNIFICANT CHANGE UP (ref 3.8–10.5)
WBC # FLD AUTO: 8.57 K/UL — SIGNIFICANT CHANGE UP (ref 3.8–10.5)
WBC UR QL: 3 /HPF — SIGNIFICANT CHANGE UP (ref 0–5)

## 2023-10-15 PROCEDURE — 93010 ELECTROCARDIOGRAM REPORT: CPT

## 2023-10-15 PROCEDURE — 99285 EMERGENCY DEPT VISIT HI MDM: CPT

## 2023-10-15 RX ORDER — DULOXETINE HYDROCHLORIDE 30 MG/1
1 CAPSULE, DELAYED RELEASE ORAL
Qty: 5 | Refills: 0
Start: 2023-10-15 | End: 2023-10-19

## 2023-10-15 RX ORDER — DULOXETINE HYDROCHLORIDE 30 MG/1
60 CAPSULE, DELAYED RELEASE ORAL DAILY
Refills: 0 | Status: DISCONTINUED | OUTPATIENT
Start: 2023-10-15 | End: 2023-10-19

## 2023-10-15 RX ORDER — METHOCARBAMOL 500 MG/1
1500 TABLET, FILM COATED ORAL ONCE
Refills: 0 | Status: COMPLETED | OUTPATIENT
Start: 2023-10-15 | End: 2023-10-15

## 2023-10-15 RX ORDER — KETOROLAC TROMETHAMINE 30 MG/ML
15 SYRINGE (ML) INJECTION ONCE
Refills: 0 | Status: DISCONTINUED | OUTPATIENT
Start: 2023-10-15 | End: 2023-10-15

## 2023-10-15 RX ADMIN — DULOXETINE HYDROCHLORIDE 60 MILLIGRAM(S): 30 CAPSULE, DELAYED RELEASE ORAL at 19:32

## 2023-10-15 RX ADMIN — Medication 15 MILLIGRAM(S): at 16:47

## 2023-10-15 RX ADMIN — METHOCARBAMOL 1500 MILLIGRAM(S): 500 TABLET, FILM COATED ORAL at 18:13

## 2023-10-15 RX ADMIN — Medication 15 MILLIGRAM(S): at 17:17

## 2023-10-15 NOTE — ED PROVIDER NOTE - NSICDXPASTMEDICALHX_GEN_ALL_CORE_FT
PAST MEDICAL HISTORY:  Borderline personality disorder     Depression     Gastritis     Tubal Ligation

## 2023-10-15 NOTE — ED PROVIDER NOTE - PROGRESS NOTE DETAILS
Psych consult: Suggested pt see crisis center tomorrow morning. Does not think it is necessary to consult pt here in ED.     Will d/c pt with duloxetine rx for 5days.

## 2023-10-15 NOTE — ED ADULT NURSE NOTE - NSFALLRISKINTERV_ED_ALL_ED

## 2023-10-15 NOTE — ED PROVIDER NOTE - CLINICAL SUMMARY MEDICAL DECISION MAKING FREE TEXT BOX
53-year-old female patient past medical history anxiety, depression, borderline personality disorder brought in by  complains of shortness of breath, wheezing, overall body weak aches x2 weeks.  Patient states she normally takes duloxetine 120 mg, started tapering off 2 weeks ago down to 60 mg for 1 week, and now is not taking any. Pt's duloxetine taper monitored by pcp. PCP wanted pt to start taking lexapro, however pt discontinued the lexapro on her own due to bad nightmares. She has not had a consistent psychiatrist. Patient thinks she is withdrawing.  Denies current SI/HI/AH/VH.  Patient has no plan or intent currently in the ED to harm herself.  Patient has had suicidal ideation in the past.  Denies cough, fever, chills, body aches, history of asthma/COPD.    Reassess after muscle relaxants, will do psych clearance labs and consult [facility] for pt's [facility]  meds.

## 2023-10-15 NOTE — ED PROVIDER NOTE - OBJECTIVE STATEMENT
53-year-old female patient past medical history anxiety, depression, borderline personality disorder brought in by  complains of shortness of breath, wheezing, overall body weak aches x2 weeks.  Patient states she normally takes duloxetine 120 mg, started tapering off 2 weeks ago down to 60 mg for 1 week, and now is not taking any.  Patient thinks she is withdrawing.  Denies current SI/HI/AH/VH.  Patient has no plan or intent currently in the ED to harm herself.  Patient has had suicidal ideation in the past.  Denies cough, fever, chills, body aches, history of asthma/COPD.

## 2023-10-15 NOTE — ED PROVIDER NOTE - ATTENDING WITH...
Patient has been rescheduled to 4:00 pm on 1/4/2023 with [facility] due to office meeting  Resident

## 2023-10-15 NOTE — ED ADULT TRIAGE NOTE - CHIEF COMPLAINT QUOTE
Pt. c/o SOB and wheezing for 2wks. States she was placed on abx and was awaiting CXR results. Also c/o dizziness, nausea and headache. Pt. thinks she is withdrawing from psychiatric medication. Denies S/I, H/I or hallucinations. PHx: depression, boarder line personality

## 2023-10-15 NOTE — ED PROVIDER NOTE - PHYSICAL EXAMINATION
GENERAL: NAD  HEENT:  Atraumatic  CHEST/LUNG: Chest rise equal bilaterally, clear breath sounds b/l  HEART: Regular rate and rhythm  ABDOMEN: Soft, Nontender, Nondistended  EXTREMITIES:  Extremities warm  PSYCH: A&Ox3  SKIN: No obvious rashes or lesions  MSK: No cervical spine TTP, able to range neck to the left and right/ No midline spinal TTP  NEUROLOGY: strength and sensation intact in all extremities.

## 2023-10-15 NOTE — ED ADULT NURSE NOTE - OBJECTIVE STATEMENT
Patient received to bed 6 A&Ox4, ambulatory with PMHx borderline personality disorder, depression coming to the ED for complaints of SOB, headache, dizziness x 2 weeks. Patient endorsing multiple falls this week due to dizziness. Denies head-strike, blood thinners, LOC. Patient RR equal and unlabored, O2 saturation 100% on room air, no acute respiratory distress noted. Endorsing intermittent abdominal pain, nausea and vomiting. Patient states to have been taking Cymbalta and has been weening off medication as per provider order. Patient denies SI/HI/Hallucinations/active plan at this time. Patient states to have changed medications and "had passive thoughts of harming herself, but does not have a plan to act on it. MD aware. Pt placed on cardiac monitor, Sinus tachycardia noted. Denies chest pain, fevers, chills, blurry vision at this time. 20G IV placed in the R AC. labs drawn and sent. endorsing occasional marijuana use. Medicated as ordered. Care plan continued. Comfort measures provided. Safety maintained. Awaiting lab results and imaging.

## 2023-10-15 NOTE — ED PROVIDER NOTE - PATIENT PORTAL LINK FT
You can access the FollowMyHealth Patient Portal offered by [facility] by registering at the following website: [email or web address]. By joining [facility]’s FollowMyHealth portal, you will also be able to view your health information using other applications (apps) compatible with our system.

## 2023-10-15 NOTE — ED PROVIDER NOTE - NSFOLLOWUPINSTRUCTIONS_ED_ALL_ED_FT
Please follow [facility] with [facility] tomorrow.  [phone]    Managing Anxiety, Adult  After being diagnosed with anxiety, you may be relieved to know why you have felt or behaved a certain way. You may also feel overwhelmed about the treatment ahead and what it will mean for your life. With care and support, you can manage this condition.    How to manage lifestyle changes  Managing stress and anxiety    An adult doing mindful breathing while practicing yoga.  Stress is your body's reaction to life changes and events, both good and bad. Most stress will last just a few hours, but stress can be ongoing and can lead to more than just stress. Although stress can play a major role in anxiety, it is not the same as anxiety. Stress is usually caused by something external, such as a deadline, test, or competition. Stress normally passes after the triggering event has ended.    Anxiety is caused by something internal, such as imagining a terrible outcome or worrying that something will go wrong that will devastate you. Anxiety often does not go away even after the triggering event is over, and it can become long-term (chronic) worry. It is important to understand the differences between stress and anxiety and to manage your stress effectively so that it does not lead to an anxious response.    Talk with your health care provider or a counselor to learn more about reducing anxiety and stress. He or she may suggest tension reduction techniques, such as:  Music therapy. Spend time creating or listening to music that you enjoy and that inspires you.  Mindfulness-based meditation. Practice being aware of your normal breaths while not trying to control your breathing. It can be done while sitting or walking.  Centering prayer. This involves focusing on a word, phrase, or sacred image that means something to you and brings you peace.  Deep breathing. To do this, expand your stomach and inhale slowly through your nose. Hold your breath for 3–5 seconds. Then exhale slowly, letting your stomach muscles relax.  Self-talk. Learn to notice and identify thought patterns that lead to anxiety reactions and change those patterns to thoughts that feel peaceful.  Muscle relaxation. Taking time to tense muscles and then relax them.  Choose a tension reduction technique that fits your lifestyle and personality. These techniques take time and practice. Set aside 5–15 minutes a day to do them. Therapists can offer counseling and training in these techniques. The training to help with anxiety may be covered by some insurance plans.    Other things you can do to manage stress and anxiety include:  Keeping a stress diary. This can help you learn what triggers your reaction and then learn ways to manage your response.  Thinking about how you react to certain situations. You may not be able to control everything, but you can control your response.  Making time for activities that help you relax and not feeling guilty about spending your time in this way.  Doing visual imagery. This involves imagining or creating mental pictures to help you relax.  Practicing yoga. Through yoga poses, you can lower tension and promote relaxation.  Medicines    Medicines can help ease symptoms. Medicines for anxiety include:  Antidepressant medicines. These are usually prescribed for long-term daily control.  Anti-anxiety medicines. These may be added in severe cases, especially when panic attacks occur.  Medicines will be prescribed by a health care provider. When used together, medicines, psychotherapy, and tension reduction techniques may be the most effective treatment.    Relationships    Relationships can play a big part in helping you recover. Try to spend more time connecting with trusted friends and family members.  Consider going to couples counseling if you have a partner, taking family education classes, or going to family therapy.  Therapy can help you and others better understand your condition.  How to recognize changes in your anxiety  Everyone responds differently to treatment for anxiety. Recovery from anxiety happens when symptoms decrease and stop interfering with your daily activities at home or work. This may mean that you will start to:  Have better concentration and focus. Worry will interfere less in your daily thinking.  Sleep better.  Be less irritable.  Have more energy.  Have improved memory.  It is also important to recognize when your condition is getting worse. Contact your health care provider if your symptoms interfere with home or work and you feel like your condition is not improving.    Follow these instructions at home:  Activity    Exercise. Adults should do the following:  Exercise for at least 150 minutes each week. The exercise should increase your heart rate and make you sweat (moderate-intensity exercise).  Strengthening exercises at least twice a week.  Get the right amount and quality of sleep. Most adults need 7–9 hours of sleep each night.  Lifestyle    Two adults cook together in a kitchen. Fruit and vegetables are on the counter in front of them.  Eat a healthy diet that includes plenty of vegetables, fruits, whole grains, low-fat dairy products, and lean protein.  Do not eat a lot of foods that are high in fats, added sugars, or salt (sodium).  Make choices that simplify your life.  Do not use any products that contain nicotine or tobacco. These products include cigarettes, chewing tobacco, and vaping devices, such as e-cigarettes. If you need help quitting, ask your health care provider.  Avoid caffeine, alcohol, and certain over-the-counter cold medicines. These may make you feel worse. Ask your pharmacist which medicines to avoid.  General instructions    Take over-the-counter and prescription medicines only as told by your health care provider.  Keep all follow-up visits. This is important.  Where to find support  You can get help and support from these sources:  Self-help groups.  Online and community organizations.  A trusted spiritual leader.  Couples counseling.  Family education classes.  Family therapy.  Where to find more information  You may find that joining a support group helps you deal with your anxiety. The following sources can help you locate counselors or support groups near you:  Mental Health [place]: www.mentalhealth[place]ica.net  Anxiety and Depression Association of [place] (ADAA): www.adaa.org  National [place] on Mental Illness (DESI): www.desi.org  Contact a health care provider if:  You have a hard time staying focused or finishing daily tasks.  You spend many hours a day feeling worried about everyday life.  You become exhausted by worry.  You start to have headaches or frequently feel tense.  You develop chronic nausea or diarrhea.  Get help right away if:  You have a racing heart and shortness of breath.  You have thoughts of hurting yourself or others.  If you ever feel like you may hurt yourself or others, or have thoughts about taking your own life, get help right away. Go to your nearest emergency department or:  Call your local emergency services (194 in the [place]  Call a suicide crisis helpline, such as the National Suicide Prevention Lifeline at 1-195.979.1687 or 439 in the [place] This is open 24 hours a day in the [place]  Text the Crisis Text Line at 419206 (in the [place]  Summary  Taking steps to learn and use tension reduction techniques can help calm you and help prevent triggering an anxiety reaction.  When used together, medicines, psychotherapy, and tension reduction techniques may be the most effective treatment.  Family, friends, and partners can play a big part in supporting you.  This information is not intended to replace advice given to you by your health care provider. Make sure you discuss any questions you have with your health care provider.

## 2024-04-22 NOTE — ED ADULT NURSE NOTE - PAIN: PRESENCE, MLM
Department of Anesthesiology  Postprocedure Note    Patient: Tash Mora  MRN: [id number]  YOB: 1963  Date of evaluation: 4/22/2024    Procedure Summary       Date: 04/22/24 Room / Location: [place] [id number] / [facility]    Anesthesia Start: 1431 Anesthesia Stop: 1514    Procedure: COLONOSCOPY POLYPECTOMY SNARE BIOPSY TATTOO SPOT MARKING (Rectum) Diagnosis:       Weight loss      Hematochezia      (Weight loss [R63.4])      (Hematochezia [K92.1])    Surgeons: Mario Yo MD Responsible Provider: Rohini Gray MD    Anesthesia Type: general ASA Status: 3            Anesthesia Type: No value filed.    Ale Phase I: Ale Score: 10    Ale Phase II: Ale Score: 10    Anesthesia Post Evaluation    Comments: POST- ANESTHESIA EVALUATION       Pt Name: Tash Mora  MRN: [id number]  YOB: 1963  Date of evaluation: 4/22/2024  Time:  5:57 PM      BP (!) 133/99   Pulse 80   Temp 97 °F (36.1 °C)   Resp 20   Ht 1.803 m (5' 10.98\")   Wt 115.7 kg (255 lb)   SpO2 100%   BMI 35.58 kg/m²      Consciousness Level  Awake  Cardiopulmonary Status  Stable  Pain Adequately Treated YES  Nausea / Vomiting  NO  Adequate Hydration  YES  Anesthesia Related Complications NONE      Electronically signed by Rohini Gray MD on 4/22/2024 at 5:57 PM           No notable events documented.   denies pain/discomfort

## 2024-07-31 NOTE — ED BEHAVIORAL HEALTH ASSESSMENT NOTE - NS ED BHA MED ROS CARDIOVASCULAR
Detail Level: Zone
Patient Specific Otc Recommendations (Will Not Stick From Patient To Patient): Hydrocortisone 1%
No complaints

## 2024-09-17 ENCOUNTER — EMERGENCY (EMERGENCY)
Facility: HOSPITAL | Age: 54
LOS: 1 days | Discharge: HOME | End: 2024-09-17
Attending: EMERGENCY MEDICINE
Payer: MEDICAID

## 2024-09-17 VITALS
DIASTOLIC BLOOD PRESSURE: 80 MMHG | OXYGEN SATURATION: 98 % | SYSTOLIC BLOOD PRESSURE: 170 MMHG | TEMPERATURE: 98 F | RESPIRATION RATE: 18 BRPM | HEART RATE: 91 BPM

## 2024-09-17 VITALS
DIASTOLIC BLOOD PRESSURE: 90 MMHG | HEART RATE: 97 BPM | RESPIRATION RATE: 18 BRPM | SYSTOLIC BLOOD PRESSURE: 152 MMHG | WEIGHT: 169.76 LBS | TEMPERATURE: 98 F | OXYGEN SATURATION: 98 %

## 2024-09-17 DIAGNOSIS — Z98.51 TUBAL LIGATION STATUS: Chronic | ICD-10-CM

## 2024-09-17 DIAGNOSIS — Z98.890 OTHER SPECIFIED POSTPROCEDURAL STATES: Chronic | ICD-10-CM

## 2024-09-17 LAB
ALBUMIN SERPL ELPH-MCNC: 3.7 G/DL — SIGNIFICANT CHANGE UP (ref 3.5–5)
ALP SERPL-CCNC: 73 U/L — SIGNIFICANT CHANGE UP (ref 40–120)
ALT FLD-CCNC: 23 U/L DA — SIGNIFICANT CHANGE UP (ref 10–60)
ANION GAP SERPL CALC-SCNC: 4 MMOL/L — LOW (ref 5–17)
APPEARANCE UR: ABNORMAL
AST SERPL-CCNC: 16 U/L — SIGNIFICANT CHANGE UP (ref 10–40)
BASOPHILS # BLD AUTO: 0.05 K/UL — SIGNIFICANT CHANGE UP (ref 0–0.2)
BASOPHILS NFR BLD AUTO: 0.5 % — SIGNIFICANT CHANGE UP (ref 0–2)
BILIRUB SERPL-MCNC: 0.4 MG/DL — SIGNIFICANT CHANGE UP (ref 0.2–1.2)
BILIRUB UR-MCNC: NEGATIVE — SIGNIFICANT CHANGE UP
BUN SERPL-MCNC: 13 MG/DL — SIGNIFICANT CHANGE UP (ref 7–18)
CALCIUM SERPL-MCNC: 10.1 MG/DL — SIGNIFICANT CHANGE UP (ref 8.4–10.5)
CHLORIDE SERPL-SCNC: 106 MMOL/L — SIGNIFICANT CHANGE UP (ref 96–108)
CO2 SERPL-SCNC: 31 MMOL/L — SIGNIFICANT CHANGE UP (ref 22–31)
COLOR SPEC: YELLOW — SIGNIFICANT CHANGE UP
CREAT SERPL-MCNC: 0.98 MG/DL — SIGNIFICANT CHANGE UP (ref 0.5–1.3)
DIFF PNL FLD: NEGATIVE — SIGNIFICANT CHANGE UP
EGFR: 68 ML/MIN/1.73M2 — SIGNIFICANT CHANGE UP
EOSINOPHIL # BLD AUTO: 0.16 K/UL — SIGNIFICANT CHANGE UP (ref 0–0.5)
EOSINOPHIL NFR BLD AUTO: 1.6 % — SIGNIFICANT CHANGE UP (ref 0–6)
GLUCOSE SERPL-MCNC: 128 MG/DL — HIGH (ref 70–99)
GLUCOSE UR QL: NEGATIVE MG/DL — SIGNIFICANT CHANGE UP
HCT VFR BLD CALC: 39.5 % — SIGNIFICANT CHANGE UP (ref 34.5–45)
HGB BLD-MCNC: 13.2 G/DL — SIGNIFICANT CHANGE UP (ref 11.5–15.5)
IMM GRANULOCYTES NFR BLD AUTO: 0.2 % — SIGNIFICANT CHANGE UP (ref 0–0.9)
KETONES UR-MCNC: ABNORMAL MG/DL
LEUKOCYTE ESTERASE UR-ACNC: NEGATIVE — SIGNIFICANT CHANGE UP
LIDOCAIN IGE QN: 45 U/L — SIGNIFICANT CHANGE UP (ref 13–75)
LYMPHOCYTES # BLD AUTO: 2.19 K/UL — SIGNIFICANT CHANGE UP (ref 1–3.3)
LYMPHOCYTES # BLD AUTO: 22 % — SIGNIFICANT CHANGE UP (ref 13–44)
MCHC RBC-ENTMCNC: 32.9 PG — SIGNIFICANT CHANGE UP (ref 27–34)
MCHC RBC-ENTMCNC: 33.4 GM/DL — SIGNIFICANT CHANGE UP (ref 32–36)
MCV RBC AUTO: 98.5 FL — SIGNIFICANT CHANGE UP (ref 80–100)
MONOCYTES # BLD AUTO: 0.93 K/UL — HIGH (ref 0–0.9)
MONOCYTES NFR BLD AUTO: 9.3 % — SIGNIFICANT CHANGE UP (ref 2–14)
NEUTROPHILS # BLD AUTO: 6.6 K/UL — SIGNIFICANT CHANGE UP (ref 1.8–7.4)
NEUTROPHILS NFR BLD AUTO: 66.4 % — SIGNIFICANT CHANGE UP (ref 43–77)
NITRITE UR-MCNC: NEGATIVE — SIGNIFICANT CHANGE UP
NRBC # BLD: 0 /100 WBCS — SIGNIFICANT CHANGE UP (ref 0–0)
PH UR: 5.5 — SIGNIFICANT CHANGE UP (ref 5–8)
PLATELET # BLD AUTO: 322 K/UL — SIGNIFICANT CHANGE UP (ref 150–400)
POTASSIUM SERPL-MCNC: 3.9 MMOL/L — SIGNIFICANT CHANGE UP (ref 3.5–5.3)
POTASSIUM SERPL-SCNC: 3.9 MMOL/L — SIGNIFICANT CHANGE UP (ref 3.5–5.3)
PROT SERPL-MCNC: 6.7 G/DL — SIGNIFICANT CHANGE UP (ref 6–8.3)
PROT UR-MCNC: NEGATIVE MG/DL — SIGNIFICANT CHANGE UP
RBC # BLD: 4.01 M/UL — SIGNIFICANT CHANGE UP (ref 3.8–5.2)
RBC # FLD: 12.1 % — SIGNIFICANT CHANGE UP (ref 10.3–14.5)
RBC CASTS # UR COMP ASSIST: SIGNIFICANT CHANGE UP /HPF (ref 0–4)
SODIUM SERPL-SCNC: 141 MMOL/L — SIGNIFICANT CHANGE UP (ref 135–145)
SP GR SPEC: 1.03 — HIGH (ref 1–1.03)
UROBILINOGEN FLD QL: 1 MG/DL — SIGNIFICANT CHANGE UP (ref 0.2–1)
WBC # BLD: 9.95 K/UL — SIGNIFICANT CHANGE UP (ref 3.8–10.5)
WBC # FLD AUTO: 9.95 K/UL — SIGNIFICANT CHANGE UP (ref 3.8–10.5)
WBC UR QL: SIGNIFICANT CHANGE UP /HPF (ref 0–5)

## 2024-09-17 PROCEDURE — 96374 THER/PROPH/DIAG INJ IV PUSH: CPT

## 2024-09-17 PROCEDURE — 85025 COMPLETE CBC W/AUTO DIFF WBC: CPT

## 2024-09-17 PROCEDURE — 36415 COLL VENOUS BLD VENIPUNCTURE: CPT

## 2024-09-17 PROCEDURE — 81001 URINALYSIS AUTO W/SCOPE: CPT

## 2024-09-17 PROCEDURE — 74177 CT ABD & PELVIS W/CONTRAST: CPT | Mod: MC

## 2024-09-17 PROCEDURE — 74177 CT ABD & PELVIS W/CONTRAST: CPT | Mod: 26,MC

## 2024-09-17 PROCEDURE — 83690 ASSAY OF LIPASE: CPT

## 2024-09-17 PROCEDURE — 99285 EMERGENCY DEPT VISIT HI MDM: CPT

## 2024-09-17 PROCEDURE — 80053 COMPREHEN METABOLIC PANEL: CPT

## 2024-09-17 PROCEDURE — 96376 TX/PRO/DX INJ SAME DRUG ADON: CPT

## 2024-09-17 PROCEDURE — 99284 EMERGENCY DEPT VISIT MOD MDM: CPT | Mod: 25

## 2024-09-17 PROCEDURE — 96375 TX/PRO/DX INJ NEW DRUG ADDON: CPT

## 2024-09-17 RX ORDER — DIAZEPAM 10 MG
5 TABLET ORAL ONCE
Refills: 0 | Status: DISCONTINUED | OUTPATIENT
Start: 2024-09-17 | End: 2024-09-17

## 2024-09-17 RX ORDER — OXYCODONE AND ACETAMINOPHEN 7.5; 325 MG/1; MG/1
1 TABLET ORAL
Qty: 10 | Refills: 0
Start: 2024-09-17

## 2024-09-17 RX ORDER — TAMSULOSIN HYDROCHLORIDE 0.4 MG/1
1 CAPSULE ORAL
Qty: 14 | Refills: 0
Start: 2024-09-17

## 2024-09-17 RX ORDER — SODIUM CHLORIDE 9 MG/ML
1000 INJECTION INTRAMUSCULAR; INTRAVENOUS; SUBCUTANEOUS ONCE
Refills: 0 | Status: COMPLETED | OUTPATIENT
Start: 2024-09-17 | End: 2024-09-17

## 2024-09-17 RX ORDER — KETOROLAC TROMETHAMINE 30 MG/ML
15 INJECTION, SOLUTION INTRAMUSCULAR ONCE
Refills: 0 | Status: DISCONTINUED | OUTPATIENT
Start: 2024-09-17 | End: 2024-09-17

## 2024-09-17 RX ORDER — ONDANSETRON 2 MG/ML
4 INJECTION, SOLUTION INTRAMUSCULAR; INTRAVENOUS ONCE
Refills: 0 | Status: COMPLETED | OUTPATIENT
Start: 2024-09-17 | End: 2024-09-17

## 2024-09-17 RX ADMIN — KETOROLAC TROMETHAMINE 15 MILLIGRAM(S): 30 INJECTION, SOLUTION INTRAMUSCULAR at 06:47

## 2024-09-17 RX ADMIN — Medication 2 MILLIGRAM(S): at 03:03

## 2024-09-17 RX ADMIN — SODIUM CHLORIDE 1000 MILLILITER(S): 9 INJECTION INTRAMUSCULAR; INTRAVENOUS; SUBCUTANEOUS at 03:03

## 2024-09-17 RX ADMIN — Medication 2 MILLIGRAM(S): at 03:00

## 2024-09-17 RX ADMIN — ONDANSETRON 4 MILLIGRAM(S): 2 INJECTION, SOLUTION INTRAMUSCULAR; INTRAVENOUS at 06:48

## 2024-09-17 RX ADMIN — Medication 5 MILLIGRAM(S): at 02:54

## 2024-09-17 RX ADMIN — Medication 4 MILLIGRAM(S): at 05:35

## 2024-09-17 NOTE — ED PROVIDER NOTE - CLINICAL SUMMARY MEDICAL DECISION MAKING FREE TEXT BOX
54-year-old female presenting with chronic pelvic pain.  Recent evaluation by PCP and urogynecologist. Plan to perform labs, urinalysis, CT imaging of abdomen pelvis and reassess 54-year-old female presenting with acute on chronic pelvic pain.  Recent negative evaluation by PCP and urogynecologist. Plan to perform labs, urinalysis, CT imaging of abdomen pelvis and reassess

## 2024-09-17 NOTE — ED PROVIDER NOTE - PSYCHIATRIC AFFECT
normal Principal Discharge DX:	Altered mental status  Secondary Diagnosis:	Renal failure  Secondary Diagnosis:	Lymphoma

## 2024-09-17 NOTE — CONSULT NOTE ADULT - SUBJECTIVE AND OBJECTIVE BOX
HPI    54-year-old female with history of anxiety, bipolar, GERD, chronic back pain, kidney stones several years ago s/p URS presenting with 2 months of pelvic pain that worsened last night, prompting ED visit.  Patient saw her PCP and uro-gynecologist previously for this pain. PCP gave her abx for a presumed UTI, however it was negative. Urogynecologist gave her oxybutynin but this did not help symptoms. Pain worsened last night along with emesis and she presented to ED. She denies fevers, chills, dysuria, hematuria. In ED, AVSS. WBC 9, Cr 0.98. U/A negative. Received morphine x 2 and toradol x 1 in ED which helped alleviate pain. Feels like pain is now returning.     PAST MEDICAL & SURGICAL HISTORY:  Depression      Tubal Ligation      Gastritis      Borderline personality disorder      H/O tubal ligation      H/O cervical discectomy  C4-C5          MEDICATIONS  (STANDING):    MEDICATIONS  (PRN):      FAMILY HISTORY:  No pertinent family history in first degree relatives        Allergies    No Known Allergies    Intolerances        SOCIAL HISTORY:    REVIEW OF SYSTEMS: Otherwise negative as stated in HPI    Physical Exam  Vital signs  T(C): 36.7 (24 @ 07:45), Max: 36.9 (24 @ 06:29)  HR: 91 (24 @ 07:45)  BP: 122/68 (24 @ 07:45)  SpO2: 98% (24 @ 07:45)  Wt(kg): --    Output      Gen: NAD  Pulm: No respiratory distress	  CV: RRR  GI: S/ND/NT. No CVAT bilaterally.   : Voiding  MSK: moves all 4 limbs spontaneously    LABS:       @ 01:32    WBC 9.95  / Hct 39.5  / SCr 0.98         141  |  106  |  13  ----------------------------<  128[H]  3.9   |  31  |  0.98    Ca    10.1      17 Sep 2024 01:32    TPro  6.7  /  Alb  3.7  /  TBili  0.4  /  DBili  x   /  AST  16  /  ALT  23  /  AlkPhos  73        Urinalysis Basic - ( 17 Sep 2024 06:10 )    Color: Yellow / Appearance: Cloudy / S.031 / pH: x  Gluc: x / Ketone: Trace mg/dL  / Bili: Negative / Urobili: 1.0 mg/dL   Blood: x / Protein: Negative mg/dL / Nitrite: Negative   Leuk Esterase: Negative / RBC: See Note /HPF / WBC See Note /HPF   Sq Epi: x / Non Sq Epi: x / Bacteria: x          Urinalysis with Rflx Culture (collected 24 @ 06:10)          RADIOLOGY:    < from: CT Abdomen and Pelvis w/ IV Cont (24 @ 04:30) >    ACC: [id number] EXAM:  CT ABDOMEN AND PELVIS IC   ORDERED BY: SHANNAN GARCÍA     PROCEDURE DATE:  2024          INTERPRETATION:  CLINICAL INFORMATION: Pelvic pain x2 months    COMPARISON: CT chest 2020.    CONTRAST/COMPLICATIONS:  IV Contrast: Omnipaque 350  90 cc administered   10 cc discarded  Oral Contrast: NONE  Complications: None reported at time of study completion    PROCEDURE:  CT of the Abdomen and Pelvis was performed.  Sagittal and coronal reformats were performed.    FINDINGS:  LOWER CHEST: Within normal limits.    LIVER: Within normal limits.  BILE DUCTS: Normal caliber.  GALLBLADDER: Within normal limits.  SPLEEN: Within normal limits.  PANCREAS: Within normal limits.  ADRENALS: Within normal limits.  KIDNEYS/URETERS: Normal right kidney. Left perinephric stranding and   fluid and a delayed left nephrogram. Mild to moderate left hydronephrosis   and hydroureter secondary to a 5 mm obstructing stone at the left   ureterovesicular junction.      BLADDER: Within normal limits.  REPRODUCTIVE ORGANS: Uterus and adnexa within normal limits.    BOWEL: No bowel obstruction. Appendix is normal.  PERITONEUM/RETROPERITONEUM: Within normal limits.  VESSELS: Within normal limits.  LYMPH NODES: No lymphadenopathy.  ABDOMINAL WALL: Within normal limits.  BONES: Degenerative changes.    IMPRESSION:    Left perinephric stranding and fluid and a delayed left nephrogram. Mild   to moderate left hydronephrosis and hydroureter secondary to a 5 mm   obstructing stone at theleft ureterovesicular junction.        --- End of Report ---            VIKASH ROCHA MD; Attending Radiologist  This document has been electronically signed. Sep 17 2024  4:51AM    < end of copied text >

## 2024-09-17 NOTE — CONSULT NOTE ADULT - ASSESSMENT
54-year-old female with history of anxiety, bipolar, GERD, chronic back pain, kidney stones several years ago s/p URS presenting with 2 months of pelvic pain that worsened last night, prompting ED visit.  Patient saw her PCP and uro-gynecologist previously for this pain. PCP gave her abx for a presumed UTI, however it was negative. Urogynecologist gave her oxybutynin but this did not help symptoms. Pain worsened last night along with emesis and she presented to ED. She denies fevers, chills, dysuria, hematuria. In ED, AVSS. WBC 9, Cr 0.98. U/A negative. Received morphine x 2 and toradol x 1 in ED which helped alleviate pain. CT with delayed left nephrogram and hydro 2/2 5 mm obstructing stone at the UVJ.       Plan:   -Trial of medical expulsive therapy with PO pain control  -Flomax 0.4mg qHS x30 days  -Pain control PRN  -Zofran PRN nausea  -Senna, colace, miralax  -Aggressive hydration  -Strain urine for calculi  -Return to ER for any fever > 100.4, pain uncontrolled on discharge pain medications, or inability to tolerate PO  -Follow up with Dr. Wilder in clinic [phone].   54-year-old female with history of anxiety, bipolar, GERD, chronic back pain, kidney stones several years ago s/p URS presenting with 2 months of pelvic pain that worsened last night, prompting ED visit.  Patient saw her PCP and uro-gynecologist previously for this pain. PCP gave her abx for a presumed UTI, however it was negative. Urogynecologist gave her oxybutynin but this did not help symptoms. Pain worsened last night along with emesis and she presented to ED. She denies fevers, chills, dysuria, hematuria. In ED, AVSS. WBC 9, Cr 0.98. U/A negative. Received morphine x 2 and toradol x 1 in ED which helped alleviate pain. CT with delayed left nephrogram and hydro 2/2 5 mm obstructing stone at the UVJ.       Plan:   -Trial of medical expulsive therapy with PO pain control  -Flomax 0.4mg qHS x30 days  -Pain control PRN  -Zofran PRN nausea  -Senna, colace, miralax  -Aggressive hydration  -Strain urine for calculi  -Return to ER for any fever > 100.4, pain uncontrolled on discharge pain medications, or inability to tolerate PO  -Follow up with Dr. Wilder in clinic.    54-year-old female with history of anxiety, bipolar, GERD, chronic back pain, kidney stones several years ago s/p URS presenting with 2 months of pelvic pain that worsened last night, prompting ED visit.  In ED, AVSS. WBC 9, Cr 0.98. U/A negative. Received morphine x 2 and toradol x 1 in ED which helped alleviate pain. CT with delayed left nephrogram and hydro 2/2 5 mm obstructing stone at the UVJ.       Plan:   -Trial of medical expulsive therapy with PO pain control  -Flomax 0.4mg qHS x30 days  -Pain control PRN  -Zofran PRN nausea  -Senna, colace, miralax  -Aggressive hydration  -Strain urine for calculi  -Return to ER for any fever > 100.4, pain uncontrolled on discharge pain medications, or inability to tolerate PO  -Follow up with Dr. Wilder in clinic.

## 2024-09-17 NOTE — ED ADULT NURSE NOTE - OBJECTIVE STATEMENT
Patient is a 53y/o female presenting to the ED c/o lower abdominal pain for two weeks with urgency to go to bathroom but unable to.

## 2024-09-17 NOTE — ED PROVIDER NOTE - OBJECTIVE STATEMENT
54-year-old female with history of anxiety, borderline personality disorder presenting with 2 months of pelvic pain that is worse for the past 2 days.  Patient saw her PCP and uro-gynecologist and was told "everything is fine".  Was initially treated for UTI but was later told that she did not have one.  Reports current pain is worse.  Pain is constant with increased urinary frequency. Denies being sexually active. Denies any nausea, vomiting, fever or blood in urine.  Denies any history of abdominal surgeries.

## 2024-09-17 NOTE — ED PROVIDER NOTE - PATIENT PORTAL LINK FT
You can access the FollowMyHealth Patient Portal offered by [facility] by registering at the following website: [email or web address]. By joining [employer]’s FollowMyHealth portal, you will also be able to view your health information using other applications (apps) compatible with our system.

## 2024-09-17 NOTE — ED PROVIDER NOTE - NSFOLLOWUPINSTRUCTIONS_ED_ALL_ED_FT
Kidney Stones    WHAT YOU NEED TO KNOW:    What is a kidney stone? Kidney stones form in the urinary system when the water and waste in your urine are out of balance. When this happens, certain types of waste crystals separate from the urine. The crystals build up and form kidney stones. Kidney stones can be made of uric acid, calcium, phosphate, or oxalate crystals. You may have more than one kidney stone.  Kidney Stones     What increases my risk for kidney stones?    Not drinking enough liquids (especially water) each day    Having urinary tract infections often    Too much of certain foods, such as meat, salt, nuts, and chocolate    Obesity    Certain medicines, such as diuretics, steroids, and antacids    A family history of kidney stones    Being born with a kidney or bowel disorder  What are the signs and symptoms of kidney stones?    Pain in the middle of your back that moves across to your side or that may spread to your groin    Nausea and vomiting    Urge to urinate often, burning feeling when you urinate, or pink or red urine    Tenderness in your lower back, side, or stomach  How are kidney stones diagnosed? Your healthcare provider will ask about your health and usual foods. He or she may refer you to a urologist. You may need tests to find out what type of kidney stones you have. Tests can show the size of your kidney stones and where they are in your urinary system. You may need more than one of the following:    Urine tests may show if you have blood in your urine. They may also show high amounts of the substances that form kidney stones, such as uric acid.    Blood tests show how well your kidneys are working. They may also be used to check the levels of calcium or uric acid in your blood.    X-ray or ultrasound pictures may be taken of your kidneys, bladder, and ureters. You may be given contrast liquid before an x-ray to help these show up better in the pictures. You may need to have more than one x-ray. Tell the healthcare provider if you have ever had an allergic reaction to contrast liquid.  How are kidney stones treated?    NSAIDs, such as ibuprofen, help decrease swelling, pain, and fever. This medicine is available with or without a doctor's order. NSAIDs can cause stomach bleeding or kidney problems in certain people. If you take blood thinner medicine, always ask your healthcare provider if NSAIDs are safe for you. Always read the medicine label and follow directions.    Acetaminophen decreases pain and fever. It is available without a doctor's order. Ask how much to take and how often to take it. Follow directions. Read the labels of all other medicines you are using to see if they also contain acetaminophen, or ask your doctor or pharmacist. Acetaminophen can cause liver damage if not taken correctly.    Prescription pain medicine may be given. Ask your healthcare provider how to take this medicine safely. Some prescription pain medicines contain acetaminophen. Do not take other medicines that contain acetaminophen without talking to your healthcare provider. Too much acetaminophen may cause liver damage. Prescription pain medicine may cause constipation. Ask your healthcare provider how to prevent or treat constipation.    Medicines to balance your electrolytes may be needed.    A procedure or surgery to remove the kidney stones may be needed if they do not pass on their own. Your treatment will depend on the size and location of your kidney stones.  What can I do to manage kidney stones?    Drink more liquids. Your healthcare provider may tell you to drink at least 8 to 12 (eight-ounce) cups of liquids each day. This helps flush out the kidney stones when you urinate. Water is the best liquid to drink.    Strain your urine every time you go to the bathroom. Urinate through a strainer or a piece of thin cloth to catch the stones. Take the stones to your healthcare provider so they can be sent to the lab for tests. This will help your healthcare providers plan the best treatment for you.  Look for Stones in the Filter      Ask if you should avoid any foods. You may need to limit oxalate. Oxalate is a chemical found in some plant foods. The most common type of kidney stone is made up of crystals that contain calcium and oxalate. Your healthcare provider or dietitian may recommend that you limit oxalate if you get this type of kidney stone often. You may need to limit how much sodium (salt) or protein you eat. Ask for information about the best foods for you.  High Oxalate Foods      Be physically active as directed. Your stones may pass more easily if you stay active. Physical activity can also help you manage your weight. Ask about the best activities for you.   Family Walking for Exercise  When should I seek immediate care?    You are vomiting and it is not relieved with medicine.    When should I call my doctor?    You have a fever.    You have trouble urinating.    You see blood in your urine.    You have severe pain.    You have any questions or concerns about your condition or care.  CARE AGREEMENT:    You have the right to help plan your care. Learn about your health condition and how it may be treated. Discuss treatment options with your healthcare providers to decide what care you want to receive. You always have the right to refuse treatment.    © [employer] 1973, 2024

## 2024-09-19 ENCOUNTER — EMERGENCY (EMERGENCY)
Facility: HOSPITAL | Age: 54
LOS: 1 days | Discharge: HOME | End: 2024-09-19
Attending: EMERGENCY MEDICINE
Payer: MEDICAID

## 2024-09-19 VITALS
SYSTOLIC BLOOD PRESSURE: 136 MMHG | RESPIRATION RATE: 18 BRPM | HEIGHT: 64 IN | OXYGEN SATURATION: 99 % | TEMPERATURE: 99 F | WEIGHT: 169.09 LBS | DIASTOLIC BLOOD PRESSURE: 83 MMHG | HEART RATE: 140 BPM

## 2024-09-19 DIAGNOSIS — Z98.51 TUBAL LIGATION STATUS: Chronic | ICD-10-CM

## 2024-09-19 DIAGNOSIS — Z98.890 OTHER SPECIFIED POSTPROCEDURAL STATES: Chronic | ICD-10-CM

## 2024-09-19 LAB
ALBUMIN SERPL ELPH-MCNC: 3.9 G/DL — SIGNIFICANT CHANGE UP (ref 3.5–5)
ALP SERPL-CCNC: 70 U/L — SIGNIFICANT CHANGE UP (ref 40–120)
ALT FLD-CCNC: 24 U/L DA — SIGNIFICANT CHANGE UP (ref 10–60)
ANION GAP SERPL CALC-SCNC: 4 MMOL/L — LOW (ref 5–17)
AST SERPL-CCNC: 17 U/L — SIGNIFICANT CHANGE UP (ref 10–40)
BASOPHILS # BLD AUTO: 0.04 K/UL — SIGNIFICANT CHANGE UP (ref 0–0.2)
BASOPHILS NFR BLD AUTO: 0.3 % — SIGNIFICANT CHANGE UP (ref 0–2)
BILIRUB SERPL-MCNC: 0.5 MG/DL — SIGNIFICANT CHANGE UP (ref 0.2–1.2)
BUN SERPL-MCNC: 11 MG/DL — SIGNIFICANT CHANGE UP (ref 7–18)
CALCIUM SERPL-MCNC: 9.5 MG/DL — SIGNIFICANT CHANGE UP (ref 8.4–10.5)
CHLORIDE SERPL-SCNC: 106 MMOL/L — SIGNIFICANT CHANGE UP (ref 96–108)
CO2 SERPL-SCNC: 31 MMOL/L — SIGNIFICANT CHANGE UP (ref 22–31)
CREAT SERPL-MCNC: 0.88 MG/DL — SIGNIFICANT CHANGE UP (ref 0.5–1.3)
EGFR: 77 ML/MIN/1.73M2 — SIGNIFICANT CHANGE UP
EOSINOPHIL # BLD AUTO: 0.09 K/UL — SIGNIFICANT CHANGE UP (ref 0–0.5)
EOSINOPHIL NFR BLD AUTO: 0.7 % — SIGNIFICANT CHANGE UP (ref 0–6)
GLUCOSE SERPL-MCNC: 122 MG/DL — HIGH (ref 70–99)
HCT VFR BLD CALC: 36.9 % — SIGNIFICANT CHANGE UP (ref 34.5–45)
HGB BLD-MCNC: 12.4 G/DL — SIGNIFICANT CHANGE UP (ref 11.5–15.5)
IMM GRANULOCYTES NFR BLD AUTO: 0.3 % — SIGNIFICANT CHANGE UP (ref 0–0.9)
LIDOCAIN IGE QN: 36 U/L — SIGNIFICANT CHANGE UP (ref 13–75)
LYMPHOCYTES # BLD AUTO: 1.71 K/UL — SIGNIFICANT CHANGE UP (ref 1–3.3)
LYMPHOCYTES # BLD AUTO: 12.9 % — LOW (ref 13–44)
MAGNESIUM SERPL-MCNC: 2 MG/DL — SIGNIFICANT CHANGE UP (ref 1.6–2.6)
MCHC RBC-ENTMCNC: 33 PG — SIGNIFICANT CHANGE UP (ref 27–34)
MCHC RBC-ENTMCNC: 33.6 GM/DL — SIGNIFICANT CHANGE UP (ref 32–36)
MCV RBC AUTO: 98.1 FL — SIGNIFICANT CHANGE UP (ref 80–100)
MONOCYTES # BLD AUTO: 1.07 K/UL — HIGH (ref 0–0.9)
MONOCYTES NFR BLD AUTO: 8.1 % — SIGNIFICANT CHANGE UP (ref 2–14)
NEUTROPHILS # BLD AUTO: 10.26 K/UL — HIGH (ref 1.8–7.4)
NEUTROPHILS NFR BLD AUTO: 77.7 % — HIGH (ref 43–77)
NRBC # BLD: 0 /100 WBCS — SIGNIFICANT CHANGE UP (ref 0–0)
PLATELET # BLD AUTO: 313 K/UL — SIGNIFICANT CHANGE UP (ref 150–400)
POTASSIUM SERPL-MCNC: 4.1 MMOL/L — SIGNIFICANT CHANGE UP (ref 3.5–5.3)
POTASSIUM SERPL-SCNC: 4.1 MMOL/L — SIGNIFICANT CHANGE UP (ref 3.5–5.3)
PROT SERPL-MCNC: 6.9 G/DL — SIGNIFICANT CHANGE UP (ref 6–8.3)
RBC # BLD: 3.76 M/UL — LOW (ref 3.8–5.2)
RBC # FLD: 12 % — SIGNIFICANT CHANGE UP (ref 10.3–14.5)
SODIUM SERPL-SCNC: 141 MMOL/L — SIGNIFICANT CHANGE UP (ref 135–145)
WBC # BLD: 13.21 K/UL — HIGH (ref 3.8–10.5)
WBC # FLD AUTO: 13.21 K/UL — HIGH (ref 3.8–10.5)

## 2024-09-19 PROCEDURE — 99284 EMERGENCY DEPT VISIT MOD MDM: CPT

## 2024-09-19 RX ORDER — ONDANSETRON 2 MG/ML
4 INJECTION, SOLUTION INTRAMUSCULAR; INTRAVENOUS ONCE
Refills: 0 | Status: COMPLETED | OUTPATIENT
Start: 2024-09-19 | End: 2024-09-19

## 2024-09-19 RX ORDER — SODIUM CHLORIDE 9 MG/ML
1000 INJECTION INTRAMUSCULAR; INTRAVENOUS; SUBCUTANEOUS ONCE
Refills: 0 | Status: COMPLETED | OUTPATIENT
Start: 2024-09-19 | End: 2024-09-19

## 2024-09-19 RX ORDER — KETOROLAC TROMETHAMINE 30 MG/ML
15 INJECTION, SOLUTION INTRAMUSCULAR ONCE
Refills: 0 | Status: DISCONTINUED | OUTPATIENT
Start: 2024-09-19 | End: 2024-09-19

## 2024-09-19 RX ORDER — SODIUM CHLORIDE 9 MG/ML
1000 INJECTION INTRAMUSCULAR; INTRAVENOUS; SUBCUTANEOUS
Refills: 0 | Status: DISCONTINUED | OUTPATIENT
Start: 2024-09-19 | End: 2024-09-23

## 2024-09-19 RX ADMIN — ONDANSETRON 4 MILLIGRAM(S): 2 INJECTION, SOLUTION INTRAMUSCULAR; INTRAVENOUS at 20:59

## 2024-09-19 RX ADMIN — SODIUM CHLORIDE 1000 MILLILITER(S): 9 INJECTION INTRAMUSCULAR; INTRAVENOUS; SUBCUTANEOUS at 20:56

## 2024-09-19 RX ADMIN — KETOROLAC TROMETHAMINE 15 MILLIGRAM(S): 30 INJECTION, SOLUTION INTRAMUSCULAR at 20:57

## 2024-09-19 NOTE — ED ADULT NURSE NOTE - SKIN TEMPERATURE MOISTURE
Plan: Recheck 4 months after completing at [facility]
Detail Level: Detailed
Continue Regimen: Sunscreen
Initiate Treatment: 5FU (pt has wife’s Rx at home) twice a day on forehead/temples and across scalp to hairline x 2 weeks. Wash hands after and avoid sun exposure.
warm

## 2024-09-19 NOTE — ED ADULT TRIAGE NOTE - CHIEF COMPLAINT QUOTE
Pt  reports that SEVERE   PAIN LEFT FLANK  from Sunday . She was  diagnosed with left kidney stone 6 mm   on Sunday in this ED

## 2024-09-20 VITALS
TEMPERATURE: 98 F | OXYGEN SATURATION: 97 % | SYSTOLIC BLOOD PRESSURE: 116 MMHG | HEART RATE: 83 BPM | DIASTOLIC BLOOD PRESSURE: 76 MMHG | RESPIRATION RATE: 16 BRPM

## 2024-09-20 LAB
APPEARANCE UR: CLEAR — SIGNIFICANT CHANGE UP
BILIRUB UR-MCNC: NEGATIVE — SIGNIFICANT CHANGE UP
COLOR SPEC: YELLOW — SIGNIFICANT CHANGE UP
DIFF PNL FLD: ABNORMAL
GLUCOSE UR QL: NEGATIVE MG/DL — SIGNIFICANT CHANGE UP
KETONES UR-MCNC: NEGATIVE MG/DL — SIGNIFICANT CHANGE UP
LEUKOCYTE ESTERASE UR-ACNC: NEGATIVE — SIGNIFICANT CHANGE UP
NITRITE UR-MCNC: NEGATIVE — SIGNIFICANT CHANGE UP
PH UR: 7.5 — SIGNIFICANT CHANGE UP (ref 5–8)
PROT UR-MCNC: ABNORMAL MG/DL
RBC CASTS # UR COMP ASSIST: 0 /HPF — SIGNIFICANT CHANGE UP (ref 0–4)
SP GR SPEC: 1.02 — SIGNIFICANT CHANGE UP (ref 1–1.03)
UROBILINOGEN FLD QL: 0.2 MG/DL — SIGNIFICANT CHANGE UP (ref 0.2–1)
WBC UR QL: 0 /HPF — SIGNIFICANT CHANGE UP (ref 0–5)

## 2024-09-20 PROCEDURE — 83735 ASSAY OF MAGNESIUM: CPT

## 2024-09-20 PROCEDURE — 81001 URINALYSIS AUTO W/SCOPE: CPT

## 2024-09-20 PROCEDURE — 36415 COLL VENOUS BLD VENIPUNCTURE: CPT

## 2024-09-20 PROCEDURE — 99284 EMERGENCY DEPT VISIT MOD MDM: CPT | Mod: 25

## 2024-09-20 PROCEDURE — 80053 COMPREHEN METABOLIC PANEL: CPT

## 2024-09-20 PROCEDURE — 96374 THER/PROPH/DIAG INJ IV PUSH: CPT

## 2024-09-20 PROCEDURE — 83690 ASSAY OF LIPASE: CPT

## 2024-09-20 PROCEDURE — 96375 TX/PRO/DX INJ NEW DRUG ADDON: CPT

## 2024-09-20 PROCEDURE — 85025 COMPLETE CBC W/AUTO DIFF WBC: CPT

## 2024-09-20 RX ADMIN — KETOROLAC TROMETHAMINE 15 MILLIGRAM(S): 30 INJECTION, SOLUTION INTRAMUSCULAR at 02:04

## 2024-09-20 NOTE — ED PROVIDER NOTE - ENMT, MLM
Airway patent, Nasal mucosa clear. Mouth with normal mucosa. Throat has no vesicles, no oropharyngeal exudates and uvula is midline.  ant neck-scar

## 2024-09-20 NOTE — ED PROVIDER NOTE - NSPTACCESSSVCSAPPTDETAILS_ED_ALL_ED_FT
54-year-old female with left UVJ stone that was diagnosed on 9/17, return to ED on 9/19 with pain.    Urology outpatient referral placed within 1 week

## 2024-09-20 NOTE — ED PROVIDER NOTE - PATIENT PORTAL LINK FT
You can access the FollowMyHealth Patient Portal offered by [facility] by registering at the following website: [email or web address]. By joining [employer]’s FollowMyHealth portal, you will also be able to view your health information using other applications (apps) compatible with our system.

## 2024-09-20 NOTE — ED PROVIDER NOTE - NSFOLLOWUPINSTRUCTIONS_ED_ALL_ED_FT
Renal Colic  The urinary tract with a close-up of a kidney with kidney stones.  Renal colic is pain that is caused by passing a kidney stone. The pain can be sharp and severe. It may be felt in your back, abdomen, side (flank), or groin. It can cause nausea. Renal colic can come and go.    Follow these instructions at home:  Watch your condition for any changes.    Medicines    Take over-the-counter and prescription medicines only as told by your health care provider.  Ask your provider if the medicine prescribed to you:  Requires you to avoid driving or using machinery.  Can cause constipation. You may need to take these actions to prevent or treat constipation:  Take over-the-counter or prescription medicines.  Eat foods that are high in fiber, such as beans, whole grains, and fresh fruits and vegetables.  Limit foods that are high in fat and processed sugars, such as fried or sweet foods.  Eating and drinking    Drink enough fluid to keep your pee (urine) pale yellow. You may be told to drink at least 8–10 glasses of water each day.  Follow instructions from your provider about what you may eat and drink.  If told, change your diet. You may need to:  Limit how much salt (sodium) you eat. You may need to eat less than 2 grams (2,000 mg) per day.  Eat more fruits and vegetables.  Limit how much animal protein you eat. This includes red meat, fish, poultry, and eggs.  Avoid foods such as spinach, rhubarb, sweet potatoes, and nuts. These foods make kidney stones more likely to form.  General instructions    Collect pee samples as told by your provider. You may need to collect a pee sample after you pass the kidney stone.  Strain your pee every time you pee (urinate), for as long as you are told. Use the strainer that your provider gives you.  Do not throw out the kidney stone after you pass it. Keep the stone so it can be tested by your provider. Testing the makeup of your kidney stone may show why you got it and help prevent you from getting more in the future.  Your provider may give you more instructions. Make sure you know what you can and cannot do.    Contact a health care provider if:  You have a fever or chills.  Your pee smells bad or looks cloudy.  You have pain or burning when you pee.  You have blood in your pee.  Get help right away if:  The pain in your flank or groin suddenly gets worse.  You become confused or do not know the time of day, where you are, or who you are (become disoriented).  You feel like you may faint or you faint.  This information is not intended to replace advice given to you by your health care provider. Make sure you discuss any questions you have with your health care provider.      drink plenty of fluids

## 2024-09-20 NOTE — ED PROVIDER NOTE - CLINICAL SUMMARY MEDICAL DECISION MAKING FREE TEXT BOX
54-year-old female with recently diagnosed left UVJ stone, now returned because of pain, will repeat chemistry to check for renal function, also to give Toradol and reassess.  Since patient has a 5 mm left UVJ stone, no need for another repeat CT.

## 2024-09-20 NOTE — ED PROVIDER NOTE - OBJECTIVE STATEMENT
54-year-old female with history of bipolar, hiatal hernia, Schatzki ring, thyroidectomies due to thyroid mass, currently on levothyroxine.  Patient with left flank pain on and off for past few months, again with pain on Monday and was evaluated in the ED on Tuesday. She was diagnosed with 5 mm left UVJ stone, was discharged home with tamsulosin and Percocet.  Patient now complaining of left flank pain around 4:30 PM radiates to her lower pelvic area, associated with nauseousness, urge to defecate.  She denies vomiting, dysuria, hematuria, fever

## 2024-09-20 NOTE — ED PROVIDER NOTE - PROGRESS NOTE DETAILS
labs result explained to patient   patient denies any pain, awaiting for UA   patient's leukocytosis can be due to stress   given patient strainer to catch her stone UA is negative, patient is pain-free, will discharge home   urology outpatient referral

## 2025-01-01 NOTE — PATIENT PROFILE ADULT - CONTRAINDICATIONS & PRECAUTIONS (SELECT ALL THAT APPLY)
Labor & Delivery Progress Note    SUBJECTIVE:  Chief Complaint   Patient presents with    Headache       Reports  now comfortable with epidural. Denies pain or pressure with contractions . RN needed to stop pitocin during epidural placement as pt was too uncomfortable.     OBJECTIVE:  Visit Vitals  /79   Pulse (!) 50   Temp 98.5 °F (36.9 °C)   Resp 18   Ht 5' (1.524 m)   Wt 67.8 kg (149 lb 8 oz)   SpO2 100%   BMI 29.20 kg/m²       SVE:    Dilation 3.5   Effacement 80   Station 0   Consistency Soft   Position  Presentation             GBS negative, no need for antibiotics.    ASSESSMENT:  38w5d IUP  Patient Active Problem List   Diagnosis    Migraine    Major depression in complete remission (CMD)     Prolonged latent labor.  Fetal Heart Rate Interpretation:     PLAN:  Discussed AROM with pt and SO agreeable to AROM. AROM performed, clear fluid return.   Continue to titrate pitocin per protocol  Maternal repositioning with peanut ball   Encourage rest   Recheck cervix in 4 hours and PRN     Nestor Otoole CNM   Moderate to severe acute illness with or without fever. Delay administration of vaccination until patient has been afebrile or illness resolved for 24hrs

## 2025-06-09 ENCOUNTER — EMERGENCY (EMERGENCY)
Facility: HOSPITAL | Age: 55
LOS: 1 days | End: 2025-06-09
Attending: EMERGENCY MEDICINE
Payer: MEDICAID

## 2025-06-09 VITALS
OXYGEN SATURATION: 95 % | WEIGHT: 171.96 LBS | SYSTOLIC BLOOD PRESSURE: 122 MMHG | DIASTOLIC BLOOD PRESSURE: 78 MMHG | TEMPERATURE: 98 F | HEART RATE: 102 BPM | RESPIRATION RATE: 17 BRPM

## 2025-06-09 DIAGNOSIS — Z98.890 OTHER SPECIFIED POSTPROCEDURAL STATES: Chronic | ICD-10-CM

## 2025-06-09 DIAGNOSIS — Z98.51 TUBAL LIGATION STATUS: Chronic | ICD-10-CM

## 2025-06-09 LAB
ALBUMIN SERPL ELPH-MCNC: 3.4 G/DL — LOW (ref 3.5–5)
ALP SERPL-CCNC: 74 U/L — SIGNIFICANT CHANGE UP (ref 40–120)
ALT FLD-CCNC: 22 U/L DA — SIGNIFICANT CHANGE UP (ref 10–60)
ANION GAP SERPL CALC-SCNC: 2 MMOL/L — LOW (ref 5–17)
APPEARANCE UR: CLEAR — SIGNIFICANT CHANGE UP
AST SERPL-CCNC: 16 U/L — SIGNIFICANT CHANGE UP (ref 10–40)
BACTERIA # UR AUTO: ABNORMAL /HPF
BASOPHILS # BLD AUTO: 0.02 K/UL — SIGNIFICANT CHANGE UP (ref 0–0.2)
BASOPHILS NFR BLD AUTO: 0.2 % — SIGNIFICANT CHANGE UP (ref 0–2)
BILIRUB SERPL-MCNC: 0.3 MG/DL — SIGNIFICANT CHANGE UP (ref 0.2–1.2)
BILIRUB UR-MCNC: NEGATIVE — SIGNIFICANT CHANGE UP
BUN SERPL-MCNC: 10 MG/DL — SIGNIFICANT CHANGE UP (ref 7–18)
CALCIUM SERPL-MCNC: 9 MG/DL — SIGNIFICANT CHANGE UP (ref 8.4–10.5)
CHLORIDE SERPL-SCNC: 107 MMOL/L — SIGNIFICANT CHANGE UP (ref 96–108)
CO2 SERPL-SCNC: 29 MMOL/L — SIGNIFICANT CHANGE UP (ref 22–31)
COLOR SPEC: YELLOW — SIGNIFICANT CHANGE UP
CREAT SERPL-MCNC: 0.78 MG/DL — SIGNIFICANT CHANGE UP (ref 0.5–1.3)
DIFF PNL FLD: ABNORMAL
EGFR: 89 ML/MIN/1.73M2 — SIGNIFICANT CHANGE UP
EGFR: 89 ML/MIN/1.73M2 — SIGNIFICANT CHANGE UP
EOSINOPHIL # BLD AUTO: 0.12 K/UL — SIGNIFICANT CHANGE UP (ref 0–0.5)
EOSINOPHIL NFR BLD AUTO: 1.1 % — SIGNIFICANT CHANGE UP (ref 0–6)
EPI CELLS # UR: PRESENT
GLUCOSE SERPL-MCNC: 107 MG/DL — HIGH (ref 70–99)
GLUCOSE UR QL: NEGATIVE MG/DL — SIGNIFICANT CHANGE UP
HCT VFR BLD CALC: 37.4 % — SIGNIFICANT CHANGE UP (ref 34.5–45)
HGB BLD-MCNC: 12.4 G/DL — SIGNIFICANT CHANGE UP (ref 11.5–15.5)
IMM GRANULOCYTES NFR BLD AUTO: 0.3 % — SIGNIFICANT CHANGE UP (ref 0–0.9)
KETONES UR QL: NEGATIVE MG/DL — SIGNIFICANT CHANGE UP
LEUKOCYTE ESTERASE UR-ACNC: ABNORMAL
LYMPHOCYTES # BLD AUTO: 1.04 K/UL — SIGNIFICANT CHANGE UP (ref 1–3.3)
LYMPHOCYTES # BLD AUTO: 9.2 % — LOW (ref 13–44)
MCHC RBC-ENTMCNC: 31.9 PG — SIGNIFICANT CHANGE UP (ref 27–34)
MCHC RBC-ENTMCNC: 33.2 G/DL — SIGNIFICANT CHANGE UP (ref 32–36)
MCV RBC AUTO: 96.1 FL — SIGNIFICANT CHANGE UP (ref 80–100)
MONOCYTES # BLD AUTO: 0.71 K/UL — SIGNIFICANT CHANGE UP (ref 0–0.9)
MONOCYTES NFR BLD AUTO: 6.3 % — SIGNIFICANT CHANGE UP (ref 2–14)
NEUTROPHILS # BLD AUTO: 9.44 K/UL — HIGH (ref 1.8–7.4)
NEUTROPHILS NFR BLD AUTO: 82.9 % — HIGH (ref 43–77)
NITRITE UR-MCNC: NEGATIVE — SIGNIFICANT CHANGE UP
NRBC BLD AUTO-RTO: 0 /100 WBCS — SIGNIFICANT CHANGE UP (ref 0–0)
PH UR: 6.5 — SIGNIFICANT CHANGE UP (ref 5–8)
PLATELET # BLD AUTO: 340 K/UL — SIGNIFICANT CHANGE UP (ref 150–400)
POTASSIUM SERPL-MCNC: 4.4 MMOL/L — SIGNIFICANT CHANGE UP (ref 3.5–5.3)
POTASSIUM SERPL-SCNC: 4.4 MMOL/L — SIGNIFICANT CHANGE UP (ref 3.5–5.3)
PROT SERPL-MCNC: 6.5 G/DL — SIGNIFICANT CHANGE UP (ref 6–8.3)
PROT UR-MCNC: 300 MG/DL
RBC # BLD: 3.89 M/UL — SIGNIFICANT CHANGE UP (ref 3.8–5.2)
RBC # FLD: 12.4 % — SIGNIFICANT CHANGE UP (ref 10.3–14.5)
RBC CASTS # UR COMP ASSIST: >50 /HPF — HIGH (ref 0–4)
SODIUM SERPL-SCNC: 138 MMOL/L — SIGNIFICANT CHANGE UP (ref 135–145)
SP GR SPEC: 1.02 — SIGNIFICANT CHANGE UP (ref 1–1.03)
UROBILINOGEN FLD QL: 1 MG/DL — SIGNIFICANT CHANGE UP (ref 0.2–1)
WBC # BLD: 11.36 K/UL — HIGH (ref 3.8–10.5)
WBC # FLD AUTO: 11.36 K/UL — HIGH (ref 3.8–10.5)
WBC UR QL: 10 /HPF — HIGH (ref 0–5)

## 2025-06-09 PROCEDURE — 93005 ELECTROCARDIOGRAM TRACING: CPT

## 2025-06-09 PROCEDURE — 99285 EMERGENCY DEPT VISIT HI MDM: CPT

## 2025-06-09 PROCEDURE — 87086 URINE CULTURE/COLONY COUNT: CPT

## 2025-06-09 PROCEDURE — 96376 TX/PRO/DX INJ SAME DRUG ADON: CPT

## 2025-06-09 PROCEDURE — 85025 COMPLETE CBC W/AUTO DIFF WBC: CPT

## 2025-06-09 PROCEDURE — 74177 CT ABD & PELVIS W/CONTRAST: CPT | Mod: 26

## 2025-06-09 PROCEDURE — 80053 COMPREHEN METABOLIC PANEL: CPT

## 2025-06-09 PROCEDURE — 99284 EMERGENCY DEPT VISIT MOD MDM: CPT | Mod: 25

## 2025-06-09 PROCEDURE — 36415 COLL VENOUS BLD VENIPUNCTURE: CPT

## 2025-06-09 PROCEDURE — 74177 CT ABD & PELVIS W/CONTRAST: CPT

## 2025-06-09 PROCEDURE — 81001 URINALYSIS AUTO W/SCOPE: CPT

## 2025-06-09 PROCEDURE — 96374 THER/PROPH/DIAG INJ IV PUSH: CPT | Mod: XU

## 2025-06-09 PROCEDURE — 93010 ELECTROCARDIOGRAM REPORT: CPT

## 2025-06-09 RX ORDER — KETOROLAC TROMETHAMINE 30 MG/ML
15 INJECTION, SOLUTION INTRAMUSCULAR; INTRAVENOUS ONCE
Refills: 0 | Status: DISCONTINUED | OUTPATIENT
Start: 2025-06-09 | End: 2025-06-09

## 2025-06-09 RX ORDER — OXYCODONE HYDROCHLORIDE 30 MG/1
1 TABLET ORAL
Qty: 10 | Refills: 0
Start: 2025-06-09

## 2025-06-09 RX ADMIN — KETOROLAC TROMETHAMINE 15 MILLIGRAM(S): 30 INJECTION, SOLUTION INTRAMUSCULAR; INTRAVENOUS at 13:22

## 2025-06-09 RX ADMIN — KETOROLAC TROMETHAMINE 15 MILLIGRAM(S): 30 INJECTION, SOLUTION INTRAMUSCULAR; INTRAVENOUS at 17:02

## 2025-06-09 NOTE — ED ADULT NURSE NOTE - CAS ELECT INFOMATION PROVIDED
Discharge and medication education provided to patient, patient verbalized understanding. Instructed to follow up with PCP/DC instructions

## 2025-06-09 NOTE — ED PROVIDER NOTE - CLINICAL SUMMARY MEDICAL DECISION MAKING FREE TEXT BOX
54 yr old female with hx of Kidney stone with right stent, GERD, anxiety, partial thyroidectomy presents to ed c/o b/l groin pain and flank pain since having stent placed at [facility]? however on document state right 4mm stone at UVJ on 4/19. no stent noted. no fever, no dysuria, normal BM but have to strain.     r/o kidney stone vs kush vs intraabd cause. labs, ct, meds, ua

## 2025-06-09 NOTE — ED PROVIDER NOTE - NSFOLLOWUPINSTRUCTIONS_ED_ALL_ED_FT
Groin Pain    WHAT YOU NEED TO KNOW:    Groin pain may be felt only in your groin, or it may spread to your buttocks, thigh, or knee. An injury to your hip joint, pelvic area, lower back, or thighs can cause groin pain.    DISCHARGE INSTRUCTIONS:    Medicines: You may need any of the following:    NSAIDs, such as ibuprofen, help decrease swelling, pain, and fever. This medicine is available with or without a doctor's order. NSAIDs can cause stomach bleeding or kidney problems in certain people. If you take blood thinner medicine, always ask if NSAIDs are safe for you. Always read the medicine label and follow directions. Do not give these medicines to children younger than 6 months without direction from a healthcare provider.    Acetaminophen decreases pain. It is available without a doctor's order. Ask how much to take and how often to take it. Follow directions. Acetaminophen can cause liver damage if not taken correctly.    Take your medicine as directed. Contact your healthcare provider if you think your medicine is not helping or if you have side effects. Tell your provider if you are allergic to any medicine. Keep a list of the medicines, vitamins, and herbs you take. Include the amounts, and when and why you take them. Bring the list or the pill bottles to follow-up visits. Carry your medicine list with you in case of an emergency.  Follow up with your healthcare provider as directed: You may need to return for more tests. Write down your questions so you remember to ask them during your visits.    Self-care:    Rest as much as possible. Avoid activities that cause or increase your pain.    Apply ice on your groin for 15 to 20 minutes every hour or as directed. Use an ice pack, or put crushed ice in a plastic bag. Cover it with a towel. Ice helps prevent tissue damage and decreases swelling and pain.    Apply heat on your groin for 20 to 30 minutes every 2 hours for as many days as directed. Heat helps decrease pain and muscle spasms.  Contact your healthcare provider if:    You have a fever.    You have questions or concerns about your condition or care.  Return to the emergency department if:    You have severe pain even after you take medicine.    You have pain or burning when you urinate.    You have pain on your side that spreads to your groin.  © [employer] 1973, 2025

## 2025-06-09 NOTE — ED PROVIDER NOTE - OBJECTIVE STATEMENT
54 yr old female with hx of Kidney stone with right stent, GERD, anxiety, partial thyroidectomy presents to ed c/o b/l groin pain and flank pain since having stent placed at [facility]? however on document state right 4mm stone at UVJ on 4/19. no stent noted. no fever, no dysuria, normal BM but have to strain.

## 2025-06-09 NOTE — ED PROVIDER NOTE - CHIEF COMPLAINT
----- Message from Kumar Wang MD sent at 12/17/2018  2:56 PM CST -----  Resolved pancreas cyst. No need to repeat imaging since this was inflammatory.   Return to clinic for follow up in few months.      The patient is a 54y Female complaining of

## 2025-06-09 NOTE — ED ADULT NURSE NOTE - OBJECTIVE STATEMENT
Patient c/o suprapubic pain, dysuria, hematuria with chills, s/p ureteral stent placement 4/21. Patient states she is not fully able to empty bladder. Denies Hx UTI, chest pain/SOB, n/v, fever

## 2025-06-09 NOTE — ED PROVIDER NOTE - PROGRESS NOTE DETAILS
CT showing Well-positioned right ureteral double-J stent. No hydronephrosis. Diffuse   smooth urothelial thickening and enhancement along the right ureter and   renal pelvis likely inflammatory given indwelling stent.  Case discussed with urology, findings expected following stent placement.  Will DC with analgesia and urology clinic follow-up

## 2025-06-10 ENCOUNTER — TRANSCRIPTION ENCOUNTER (OUTPATIENT)
Age: 55
End: 2025-06-10

## 2025-06-10 LAB
CULTURE RESULTS: SIGNIFICANT CHANGE UP
SPECIMEN SOURCE: SIGNIFICANT CHANGE UP